# Patient Record
Sex: FEMALE | Race: WHITE | URBAN - METROPOLITAN AREA
[De-identification: names, ages, dates, MRNs, and addresses within clinical notes are randomized per-mention and may not be internally consistent; named-entity substitution may affect disease eponyms.]

---

## 2017-03-23 ENCOUNTER — EMERGENCY (EMERGENCY)
Facility: HOSPITAL | Age: 52
LOS: 0 days | Discharge: HOME | End: 2017-03-23
Attending: EMERGENCY MEDICINE | Admitting: EMERGENCY MEDICINE
Payer: COMMERCIAL

## 2017-03-23 VITALS
RESPIRATION RATE: 16 BRPM | DIASTOLIC BLOOD PRESSURE: 81 MMHG | SYSTOLIC BLOOD PRESSURE: 121 MMHG | HEART RATE: 99 BPM | TEMPERATURE: 98 F | OXYGEN SATURATION: 98 %

## 2017-03-23 VITALS — HEIGHT: 66 IN | WEIGHT: 220.02 LBS
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
## 2017-03-23 DIAGNOSIS — S52.591A OTHER FRACTURES OF LOWER END OF RIGHT RADIUS, INITIAL ENCOUNTER FOR CLOSED FRACTURE: ICD-10-CM

## 2017-03-23 DIAGNOSIS — S69.91XA UNSPECIFIED INJURY OF RIGHT WRIST, HAND AND FINGER(S), INITIAL ENCOUNTER: ICD-10-CM
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
## 2017-03-23 DIAGNOSIS — M79.673 PAIN IN UNSPECIFIED FOOT: ICD-10-CM

## 2017-03-23 DIAGNOSIS — M25.531 PAIN IN RIGHT WRIST: ICD-10-CM

## 2017-03-23 PROCEDURE — 99283 EMERGENCY DEPT VISIT LOW MDM: CPT

## 2017-03-23 PROCEDURE — 73110 X-RAY EXAM OF WRIST: CPT | Mod: 26,RT

## 2017-03-23 RX ORDER — IBUPROFEN 200 MG
600 TABLET ORAL ONCE
Qty: 0 | Refills: 0 | Status: DISCONTINUED | OUTPATIENT
Start: 2017-03-23 | End: 2017-03-23

## 2017-03-23 NOTE — ED STATDOCS - OBJECTIVE STATEMENT
50 y/o F presents to ED s/p slip and fall yesterday c/o right wrist injury. Pt had pain immediately after fall yesterday, went to her PMD who did XR which showed right distal radial fracture. Pain persisted today so she called Dr. Thom malave who requested consult in ED. Pt is right hand dominant.

## 2017-03-23 NOTE — ED STATDOCS - MEDICAL DECISION MAKING DETAILS
Pt. to keep arm elevated, keep splint on at all times and take ibuprofen 600mg every 6 hours for pain.  Pt. to see Dr. Tomas in the office in 1 week.

## 2017-03-23 NOTE — ED STATDOCS - DETAILS:
I, Marcia Kemp, performed the initial face to face bedside interview with this patient regarding history of present illness, review of symptoms and relevant past medical, social and family history.  I completed an independent physical examination.  I was the initial provider who evaluated this patient. I have signed out the follow up of any pending tests (i.e. labs, radiological studies) to the ACP.  I have communicated the patient’s plan of care and disposition with the ACP.  The history, relevant review of systems, past medical and surgical history, medical decision making, and physical examination was documented by the scribe in my presence and I attest to the accuracy of the documentation.

## 2017-03-23 NOTE — ED STATDOCS - ATTENDING CONTRIBUTION TO CARE
Attending Contribution to Care: I, Marcia Kemp, performed the initial face to face bedside interview with this patient regarding history of present illness, review of symptoms and relevant past medical, social and family history.  I completed an independent physical examination.  I was the initial provider who evaluated this patient. I have signed out the follow up of any pending tests (i.e. labs, radiological studies) to the ACP.  I have communicated the patient’s plan of care and disposition with the ACP.

## 2017-03-23 NOTE — ED STATDOCS - PROGRESS NOTE DETAILS
52 yo female presents with right wrist p[ain s/p slip and fall in driveway. Pt went to her PMD, had a xray and showed a fracture and was sent to the ER. NVS intact. Decreased rom of the right wrist secondary to pain. Milf ttp toe h dorsal aspect of the right distal forearm. MIld edema to the wrist and hand. - Alonzo Villalta PA-C Dr. Tomas came to see pt toe splint and reduce the fracture. - Alonzo Villalta PA-C Dr. Tomas and his residents called for consult. Fx reduced and new splint placed on RUE.

## 2017-03-23 NOTE — ED STATDOCS - CARE PLAN
Principal Discharge DX:	Other closed fracture of distal end of right radius, initial encounter  Secondary Diagnosis:	Wrist injury, right, initial encounter

## 2017-03-23 NOTE — ED STATDOCS - NS ED MD SCRIBE ATTENDING SCRIBE SECTIONS
RESULTS/CONSULTATIONS/SHIFT CHANGE/PROGRESS NOTE/REVIEW OF SYSTEMS/DISPOSITION/PHYSICAL EXAM/HISTORY OF PRESENT ILLNESS/PAST MEDICAL/SURGICAL/SOCIAL HISTORY

## 2017-04-02 ENCOUNTER — INPATIENT (INPATIENT)
Facility: HOSPITAL | Age: 52
LOS: 0 days | Discharge: HOME | End: 2017-04-02
Attending: ORTHOPAEDIC SURGERY | Admitting: ORTHOPAEDIC SURGERY
Payer: COMMERCIAL

## 2017-04-02 VITALS
DIASTOLIC BLOOD PRESSURE: 68 MMHG | SYSTOLIC BLOOD PRESSURE: 117 MMHG | TEMPERATURE: 98 F | OXYGEN SATURATION: 100 % | RESPIRATION RATE: 12 BRPM | HEART RATE: 74 BPM

## 2017-04-02 VITALS — HEIGHT: 66 IN | WEIGHT: 220.02 LBS

## 2017-04-02 DIAGNOSIS — S62.101G: ICD-10-CM

## 2017-04-02 PROCEDURE — 73110 X-RAY EXAM OF WRIST: CPT | Mod: 26,RT

## 2017-04-02 PROCEDURE — 99284 EMERGENCY DEPT VISIT MOD MDM: CPT

## 2017-04-02 PROCEDURE — 73090 X-RAY EXAM OF FOREARM: CPT | Mod: 26,RT

## 2017-04-02 RX ORDER — OXYCODONE HYDROCHLORIDE 5 MG/1
5 TABLET ORAL EVERY 4 HOURS
Qty: 0 | Refills: 0 | Status: DISCONTINUED | OUTPATIENT
Start: 2017-04-02 | End: 2017-04-02

## 2017-04-02 RX ORDER — MAGNESIUM HYDROXIDE 400 MG/1
30 TABLET, CHEWABLE ORAL DAILY
Qty: 0 | Refills: 0 | Status: DISCONTINUED | OUTPATIENT
Start: 2017-04-02 | End: 2017-04-02

## 2017-04-02 RX ORDER — ONDANSETRON 8 MG/1
4 TABLET, FILM COATED ORAL ONCE
Qty: 0 | Refills: 0 | Status: DISCONTINUED | OUTPATIENT
Start: 2017-04-02 | End: 2017-04-02

## 2017-04-02 RX ORDER — CEPHALEXIN 500 MG
1 CAPSULE ORAL
Qty: 6 | Refills: 0 | OUTPATIENT
Start: 2017-04-02 | End: 2017-04-05

## 2017-04-02 RX ORDER — FENTANYL CITRATE 50 UG/ML
50 INJECTION INTRAVENOUS
Qty: 0 | Refills: 0 | Status: DISCONTINUED | OUTPATIENT
Start: 2017-04-02 | End: 2017-04-02

## 2017-04-02 RX ORDER — CEFAZOLIN SODIUM 1 G
2000 VIAL (EA) INJECTION EVERY 8 HOURS
Qty: 0 | Refills: 0 | Status: DISCONTINUED | OUTPATIENT
Start: 2017-04-02 | End: 2017-04-02

## 2017-04-02 RX ORDER — SODIUM CHLORIDE 9 MG/ML
3 INJECTION INTRAMUSCULAR; INTRAVENOUS; SUBCUTANEOUS EVERY 8 HOURS
Qty: 0 | Refills: 0 | Status: DISCONTINUED | OUTPATIENT
Start: 2017-04-02 | End: 2017-04-02

## 2017-04-02 RX ORDER — ACETAMINOPHEN 500 MG
650 TABLET ORAL EVERY 6 HOURS
Qty: 0 | Refills: 0 | Status: DISCONTINUED | OUTPATIENT
Start: 2017-04-02 | End: 2017-04-02

## 2017-04-02 RX ORDER — SODIUM CHLORIDE 9 MG/ML
1000 INJECTION, SOLUTION INTRAVENOUS
Qty: 0 | Refills: 0 | Status: DISCONTINUED | OUTPATIENT
Start: 2017-04-02 | End: 2017-04-02

## 2017-04-02 RX ORDER — OXYCODONE HYDROCHLORIDE 5 MG/1
10 TABLET ORAL EVERY 4 HOURS
Qty: 0 | Refills: 0 | Status: DISCONTINUED | OUTPATIENT
Start: 2017-04-02 | End: 2017-04-02

## 2017-04-02 RX ORDER — ALBUTEROL 90 UG/1
1 AEROSOL, METERED ORAL
Qty: 0 | Refills: 0 | Status: DISCONTINUED | OUTPATIENT
Start: 2017-04-02 | End: 2017-04-02

## 2017-04-02 RX ORDER — HYDROMORPHONE HYDROCHLORIDE 2 MG/ML
0.5 INJECTION INTRAMUSCULAR; INTRAVENOUS; SUBCUTANEOUS
Qty: 0 | Refills: 0 | Status: DISCONTINUED | OUTPATIENT
Start: 2017-04-02 | End: 2017-04-02

## 2017-04-02 RX ORDER — DIPHENHYDRAMINE HCL 50 MG
25 CAPSULE ORAL AT BEDTIME
Qty: 0 | Refills: 0 | Status: DISCONTINUED | OUTPATIENT
Start: 2017-04-02 | End: 2017-04-02

## 2017-04-02 RX ORDER — ONDANSETRON 8 MG/1
4 TABLET, FILM COATED ORAL EVERY 6 HOURS
Qty: 0 | Refills: 0 | Status: DISCONTINUED | OUTPATIENT
Start: 2017-04-02 | End: 2017-04-02

## 2017-04-02 RX ORDER — INFLUENZA VIRUS VACCINE 15; 15; 15; 15 UG/.5ML; UG/.5ML; UG/.5ML; UG/.5ML
0.5 SUSPENSION INTRAMUSCULAR ONCE
Qty: 0 | Refills: 0 | Status: DISCONTINUED | OUTPATIENT
Start: 2017-04-02 | End: 2017-04-02

## 2017-04-02 RX ORDER — DOCUSATE SODIUM 100 MG
100 CAPSULE ORAL THREE TIMES A DAY
Qty: 0 | Refills: 0 | Status: DISCONTINUED | OUTPATIENT
Start: 2017-04-02 | End: 2017-04-02

## 2017-04-02 NOTE — ED STATDOCS - OBJECTIVE STATEMENT
50 y/o F PMHx Right hand dominant, presents to the ED c/o right arm pain. The pt provides that she fractured her wrist 5 days ago (on driveway), but tripped today morning d/t pain in her left leg and landed on her right wrist. The pt notes that her leg pain has been present since the summer, and feels like her left knee keeps cracking with some  left hip pain. No h/o loc, head trauma, headache, fever, chills, dizziness, abd pain, nvd, cp, cough, sob, or urinary incontinence.

## 2017-04-02 NOTE — DISCHARGE NOTE ADULT - HOSPITAL COURSE
The patient is a 51 year old female status post Open Reduction Surgical Fixation of a right distal radius Fracture with carpal tunnel release  after being admitted through [facility] Emergency Room. The Patient was medically Optimized for the Previously mentioned surgical procedure. The patient was taken to the operating room on date mentioned above. Prophylactic antibiotics were started before the procedure and continued for 24 hours.  There were no complications during the procedure and patient tolerated the procedure well.  The patient was transferred to recovery room in stable condition and subsequently to surgical floor.  Patient was placed on venodynes for anticoagulation.  All home medications were continued.  The patient received physical therapy daily and daily labs were followed.  The dressing  was kept clean, dry, intact.  The rest of the hospital stay was unremarkable.  The patient was discharged in stable condition to follow up as outpatient.

## 2017-04-02 NOTE — ED STATDOCS - CARE PLAN
Principal Discharge DX:	Closed fracture of wrist, right, with delayed healing, subsequent encounter  Secondary Diagnosis:	Wrist injury, right, sequela

## 2017-04-02 NOTE — ED STATDOCS - NS ED MD SCRIBE ATTENDING SCRIBE SECTIONS
REVIEW OF SYSTEMS/DISPOSITION/PHYSICAL EXAM/PAST MEDICAL/SURGICAL/SOCIAL HISTORY/INTAKE ASSESSMENT/SCREENINGS/PROGRESS NOTE/HISTORY OF PRESENT ILLNESS/CONSULTATIONS/SHIFT CHANGE/RESULTS/HIV

## 2017-04-02 NOTE — DISCHARGE NOTE ADULT - PATIENT PORTAL LINK FT
“You can access the [facility] Patient Portal, offered by [facility], by registering with the following website: [email or web address]”

## 2017-04-02 NOTE — ED STATDOCS - MUSCULOSKELETAL, MLM
+Splint over right wrist and forearm (not removed for xray), no deformity/tenderness over left knee or left hip, range of motion is not limited and there is no muscle tenderness.

## 2017-04-02 NOTE — DISCHARGE NOTE ADULT - PLAN OF CARE
return to ADLs 1.	Pain Control  2.	Non-Weight Bearing  right upper Extremity, with assistive devices as needed  3.	DVT Prophylaxis  4.	PT as needed  5.	Follow up with Dr. Tomas as Outpatient in 10-14 Days after Discharge from the Hospital or Rehab. Call Office For Appointment.  6.	 Staples/Sutures to be removed  Post-Op Day 14, and repeat x-rays  7.	Ice/Elevate affected area as Needed  8.	Keep dressing/splint Clean and dry. 1.	Pain Control  2.	Non-Weight Bearing  right upper Extremity, with assistive devices as needed  3.	DVT Prophylaxis  4.	PT as needed  5.	Follow up with Dr. Tomas as Outpatient in 1 week after Discharge from the Hospital or Rehab. Call Office For Appointment.  6.	 Staples/Sutures to be removed  Post-Op Day 14, and repeat x-rays  7.	Ice/Elevate affected area as Needed  8.	Keep dressing/splint Clean and dry.

## 2017-04-02 NOTE — DISCHARGE NOTE ADULT - NS AS ACTIVITY OBS
No Heavy lifting/straining/NWB right upper extremity/Do not drive or operate machinery/Do not make important decisions

## 2017-04-02 NOTE — DISCHARGE NOTE ADULT - MEDICATION SUMMARY - MEDICATIONS TO TAKE
I will START or STAY ON the medications listed below when I get home from the hospital:    oxyCODONE-acetaminophen 5 mg-325 mg oral tablet  -- 1 tab(s) by mouth every 4 hours, As Needed -for severe pain MDD:6  -- Caution federal law prohibits the transfer of this drug to any person other  than the person for whom it was prescribed.  May cause drowsiness.  Alcohol may intensify this effect.  Use care when operating dangerous machinery.  This prescription cannot be refilled.  This product contains acetaminophen.  Do not use  with any other product containing acetaminophen to prevent possible liver damage.  Using more of this medication than prescribed may cause serious breathing problems.    -- Indication: For pain    albuterol 90 mcg/inh inhalation aerosol  -- 2 puff(s) inhaled 4 times a day, As Needed  -- Indication: For home med    cephalexin 500 mg oral tablet  -- 1 tab(s) by mouth 2 times a day  -- Finish all this medication unless otherwise directed by prescriber.    -- Indication: For antibiotic

## 2017-04-02 NOTE — ED ADULT NURSE NOTE - OBJECTIVE STATEMENT
52 y/o F c/o right arm pain and tingling, left leg pain. Pt fell last week and broke right  wrist, today, pt tripped in bathroom injuring same arm. Pt denies loc, denies head pain.

## 2017-04-02 NOTE — BRIEF OPERATIVE NOTE - POST-OP DX
Carpal tunnel syndrome on right  04/02/2017    Active  Raghu Lopez  Distal radius fracture, right  04/02/2017    Active  Raghu Lopez S

## 2017-04-02 NOTE — H&P ADULT - NSHPPHYSICALEXAM_GEN_ALL_CORE
Gen: NAD  RUE: +swelling/ttp dorsum R wrist. clean, dry, and intact. +AIN/PIN/MN/RN/UN/MCN/AxN. SILT C5-T1. +RA, capillary refill brisk. Compartments soft.  Secondary: b/l le, lue: full rom, mild pain with b/l knee rom.  no ttp bony prominences.  silt, pulses palpable.

## 2017-04-02 NOTE — DISCHARGE NOTE ADULT - INSTRUCTIONS
for fever > 101F, any redness, swelling, increased pain or drainage at incision site please contact MD

## 2017-04-02 NOTE — ED STATDOCS - PROGRESS NOTE DETAILS
52 yo female with recent right wrist fracture presents with right wrist injury. Pt states her left knee gave out and she went forward against a bathroom counter with her splint and reinjured her arm. Denies f/c/sweating, leg swelling, calf pain. No calf ttp. Right forearm in splint and ACE wrap. Re xray. Ortho resident at bedside will evaluate pt. - Willie Villalta PA-C

## 2017-04-02 NOTE — DISCHARGE NOTE ADULT - CARE PLAN
Principal Discharge DX:	Closed fracture of wrist, right, with delayed healing, subsequent encounter  Goal:	return to ADLs  Instructions for follow-up, activity and diet:	1.	Pain Control  2.	Non-Weight Bearing  right upper Extremity, with assistive devices as needed  3.	DVT Prophylaxis  4.	PT as needed  5.	Follow up with Dr. Tomas as Outpatient in 10-14 Days after Discharge from the Hospital or Rehab. Call Office For Appointment.  6.	 Staples/Sutures to be removed  Post-Op Day 14, and repeat x-rays  7.	Ice/Elevate affected area as Needed  8.	Keep dressing/splint Clean and dry. Principal Discharge DX:	Closed fracture of wrist, right, with delayed healing, subsequent encounter  Goal:	return to ADLs  Instructions for follow-up, activity and diet:	1.	Pain Control  2.	Non-Weight Bearing  right upper Extremity, with assistive devices as needed  3.	DVT Prophylaxis  4.	PT as needed  5.	Follow up with Dr. Tomas as Outpatient in 1 week after Discharge from the Hospital or Rehab. Call Office For Appointment.  6.	 Staples/Sutures to be removed  Post-Op Day 14, and repeat x-rays  7.	Ice/Elevate affected area as Needed  8.	Keep dressing/splint Clean and dry.

## 2017-04-02 NOTE — BRIEF OPERATIVE NOTE - PROCEDURE
Wrist fracture surgery  04/02/2017  right distal radius ORIF, right carpal tunnel release  Active  NOLSON

## 2017-04-02 NOTE — ED STATDOCS - CHPI ED SYMPTOM NEG
no fever/no hematuria/no abdominal distention/no blood in stool/no diarrhea/no vomiting/no nausea/no burning urination/no dysuria/no palpitations

## 2017-04-02 NOTE — ED ADULT NURSE REASSESSMENT NOTE - NS ED NURSE REASSESS COMMENT FT1
Dr. Tomas at the bedside evaluating pt, pt possibly pending surgery, will continue to monitor for safety and comfort.

## 2017-04-02 NOTE — DISCHARGE NOTE ADULT - CARE PROVIDER_API CALL
Magaly Tomas), Orthopaedic Surgery; Surgery of the Hand  [address] [place] [address]  Phone: [phone]  Fax: [phone]

## 2017-04-02 NOTE — H&P ADULT - HISTORY OF PRESENT ILLNESS
51[facility] sustained R distal radius fx.  Pt presents to ED today s/p another [facility] fall today, c/o increasing pain, mild paresthesias in right thumb and index finger.  Denies HS/LOC, also complains of mild occasional knee pain.  Able to ambulate.    RHD.      PMH: CTS  PSH: denies  Meds: see med rec  Allergy: denies  Imaging: XR R wrist show displaced distal radius fx

## 2017-04-04 DIAGNOSIS — S52.571A OTHER INTRAARTICULAR FRACTURE OF LOWER END OF RIGHT RADIUS, INITIAL ENCOUNTER FOR CLOSED FRACTURE: ICD-10-CM
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
## 2017-04-04 DIAGNOSIS — F17.210 NICOTINE DEPENDENCE, CIGARETTES, UNCOMPLICATED: ICD-10-CM

## 2017-04-04 DIAGNOSIS — J45.909 UNSPECIFIED ASTHMA, UNCOMPLICATED: ICD-10-CM
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
## 2017-04-04 DIAGNOSIS — G56.01 CARPAL TUNNEL SYNDROME, RIGHT UPPER LIMB: ICD-10-CM
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
## 2017-11-30 ENCOUNTER — APPOINTMENT (OUTPATIENT)
Dept: BARIATRICS | Facility: CLINIC | Age: 52
End: 2017-11-30
Payer: COMMERCIAL

## 2017-11-30 VITALS
BODY MASS INDEX: 40.36 KG/M2 | SYSTOLIC BLOOD PRESSURE: 122 MMHG | DIASTOLIC BLOOD PRESSURE: 80 MMHG | WEIGHT: 251.1 LBS | HEIGHT: 66 IN

## 2017-11-30 DIAGNOSIS — F17.200 NICOTINE DEPENDENCE, UNSPECIFIED, UNCOMPLICATED: ICD-10-CM

## 2017-11-30 DIAGNOSIS — Z82.49 FAMILY HISTORY OF ISCHEMIC HEART DISEASE AND OTHER DISEASES OF THE CIRCULATORY SYSTEM: ICD-10-CM

## 2017-11-30 DIAGNOSIS — Z78.9 OTHER SPECIFIED HEALTH STATUS: ICD-10-CM

## 2017-11-30 DIAGNOSIS — Z87.19 PERSONAL HISTORY OF OTHER DISEASES OF THE DIGESTIVE SYSTEM: ICD-10-CM

## 2017-11-30 DIAGNOSIS — Z86.19 PERSONAL HISTORY OF OTHER INFECTIOUS AND PARASITIC DISEASES: ICD-10-CM

## 2017-11-30 DIAGNOSIS — Z86.79 PERSONAL HISTORY OF OTHER DISEASES OF THE CIRCULATORY SYSTEM: ICD-10-CM

## 2017-11-30 DIAGNOSIS — Z82.3 FAMILY HISTORY OF STROKE: ICD-10-CM

## 2017-11-30 DIAGNOSIS — E66.01 MORBID (SEVERE) OBESITY DUE TO EXCESS CALORIES: ICD-10-CM

## 2017-11-30 PROCEDURE — 99244 OFF/OP CNSLTJ NEW/EST MOD 40: CPT

## 2017-12-08 RX ORDER — CALCIUM CARBONATE/VITAMIN D3 600 MG-10
TABLET ORAL
Refills: 0 | Status: ACTIVE | COMMUNITY

## 2017-12-08 RX ORDER — BACILLUS COAGULANS/INULIN 1B-250 MG
CAPSULE ORAL
Refills: 0 | Status: ACTIVE | COMMUNITY

## 2017-12-08 RX ORDER — BUDESONIDE AND FORMOTEROL FUMARATE DIHYDRATE 160; 4.5 UG/1; UG/1
AEROSOL RESPIRATORY (INHALATION)
Refills: 0 | Status: ACTIVE | COMMUNITY

## 2017-12-08 RX ORDER — MULTIVITAMIN
TABLET ORAL
Refills: 0 | Status: ACTIVE | COMMUNITY

## 2018-06-22 ENCOUNTER — EMERGENCY (EMERGENCY)
Facility: HOSPITAL | Age: 53
LOS: 0 days | Discharge: HOME | End: 2018-06-22
Attending: EMERGENCY MEDICINE | Admitting: EMERGENCY MEDICINE
Payer: COMMERCIAL

## 2018-06-22 VITALS — OXYGEN SATURATION: 94 %

## 2018-06-22 VITALS — WEIGHT: 240.08 LBS

## 2018-06-22 DIAGNOSIS — J18.9 PNEUMONIA, UNSPECIFIED ORGANISM: ICD-10-CM

## 2018-06-22 DIAGNOSIS — F17.210 NICOTINE DEPENDENCE, CIGARETTES, UNCOMPLICATED: ICD-10-CM

## 2018-06-22 LAB
ALBUMIN SERPL ELPH-MCNC: 3.4 G/DL — SIGNIFICANT CHANGE UP (ref 3.3–5)
ALP SERPL-CCNC: 93 U/L — SIGNIFICANT CHANGE UP (ref 40–120)
ALT FLD-CCNC: 18 U/L — SIGNIFICANT CHANGE UP (ref 12–78)
ANION GAP SERPL CALC-SCNC: 5 MMOL/L — SIGNIFICANT CHANGE UP (ref 5–17)
AST SERPL-CCNC: 14 U/L — LOW (ref 15–37)
BASOPHILS # BLD AUTO: 0.06 K/UL — SIGNIFICANT CHANGE UP (ref 0–0.2)
BASOPHILS NFR BLD AUTO: 0.5 % — SIGNIFICANT CHANGE UP (ref 0–2)
BILIRUB SERPL-MCNC: 0.5 MG/DL — SIGNIFICANT CHANGE UP (ref 0.2–1.2)
BUN SERPL-MCNC: 12 MG/DL — SIGNIFICANT CHANGE UP (ref 7–23)
CALCIUM SERPL-MCNC: 8.7 MG/DL — SIGNIFICANT CHANGE UP (ref 8.5–10.1)
CHLORIDE SERPL-SCNC: 106 MMOL/L — SIGNIFICANT CHANGE UP (ref 96–108)
CO2 SERPL-SCNC: 27 MMOL/L — SIGNIFICANT CHANGE UP (ref 22–31)
CREAT SERPL-MCNC: 0.76 MG/DL — SIGNIFICANT CHANGE UP (ref 0.5–1.3)
EOSINOPHIL # BLD AUTO: 0.45 K/UL — SIGNIFICANT CHANGE UP (ref 0–0.5)
EOSINOPHIL NFR BLD AUTO: 3.6 % — SIGNIFICANT CHANGE UP (ref 0–6)
GLUCOSE SERPL-MCNC: 106 MG/DL — HIGH (ref 70–99)
HCT VFR BLD CALC: 42.5 % — SIGNIFICANT CHANGE UP (ref 34.5–45)
HGB BLD-MCNC: 14 G/DL — SIGNIFICANT CHANGE UP (ref 11.5–15.5)
IMM GRANULOCYTES NFR BLD AUTO: 0.3 % — SIGNIFICANT CHANGE UP (ref 0–1.5)
LYMPHOCYTES # BLD AUTO: 1.05 K/UL — SIGNIFICANT CHANGE UP (ref 1–3.3)
LYMPHOCYTES # BLD AUTO: 8.3 % — LOW (ref 13–44)
MCHC RBC-ENTMCNC: 30.3 PG — SIGNIFICANT CHANGE UP (ref 27–34)
MCHC RBC-ENTMCNC: 32.9 GM/DL — SIGNIFICANT CHANGE UP (ref 32–36)
MCV RBC AUTO: 92 FL — SIGNIFICANT CHANGE UP (ref 80–100)
MONOCYTES # BLD AUTO: 0.66 K/UL — SIGNIFICANT CHANGE UP (ref 0–0.9)
MONOCYTES NFR BLD AUTO: 5.2 % — SIGNIFICANT CHANGE UP (ref 2–14)
NEUTROPHILS # BLD AUTO: 10.33 K/UL — HIGH (ref 1.8–7.4)
NEUTROPHILS NFR BLD AUTO: 82.1 % — HIGH (ref 43–77)
NRBC # BLD: 0 /100 WBCS — SIGNIFICANT CHANGE UP (ref 0–0)
NT-PROBNP SERPL-SCNC: 105 PG/ML — SIGNIFICANT CHANGE UP (ref 0–125)
PLATELET # BLD AUTO: 270 K/UL — SIGNIFICANT CHANGE UP (ref 150–400)
POTASSIUM SERPL-MCNC: 4.1 MMOL/L — SIGNIFICANT CHANGE UP (ref 3.5–5.3)
POTASSIUM SERPL-SCNC: 4.1 MMOL/L — SIGNIFICANT CHANGE UP (ref 3.5–5.3)
PROT SERPL-MCNC: 7.8 GM/DL — SIGNIFICANT CHANGE UP (ref 6–8.3)
RBC # BLD: 4.62 M/UL — SIGNIFICANT CHANGE UP (ref 3.8–5.2)
RBC # FLD: 13.2 % — SIGNIFICANT CHANGE UP (ref 10.3–14.5)
SODIUM SERPL-SCNC: 138 MMOL/L — SIGNIFICANT CHANGE UP (ref 135–145)
TROPONIN I SERPL-MCNC: <0.015 NG/ML — SIGNIFICANT CHANGE UP (ref 0.01–0.04)
WBC # BLD: 12.59 K/UL — HIGH (ref 3.8–10.5)
WBC # FLD AUTO: 12.59 K/UL — HIGH (ref 3.8–10.5)

## 2018-06-22 PROCEDURE — 93010 ELECTROCARDIOGRAM REPORT: CPT

## 2018-06-22 PROCEDURE — 71260 CT THORAX DX C+: CPT | Mod: 26

## 2018-06-22 PROCEDURE — 99285 EMERGENCY DEPT VISIT HI MDM: CPT

## 2018-06-22 RX ORDER — IPRATROPIUM/ALBUTEROL SULFATE 18-103MCG
3 AEROSOL WITH ADAPTER (GRAM) INHALATION ONCE
Qty: 0 | Refills: 0 | Status: COMPLETED | OUTPATIENT
Start: 2018-06-22 | End: 2018-06-22

## 2018-06-22 RX ORDER — ALBUTEROL 90 UG/1
2 AEROSOL, METERED ORAL
Qty: 0 | Refills: 0 | COMMUNITY

## 2018-06-22 RX ORDER — SODIUM CHLORIDE 9 MG/ML
1000 INJECTION INTRAMUSCULAR; INTRAVENOUS; SUBCUTANEOUS ONCE
Qty: 0 | Refills: 0 | Status: COMPLETED | OUTPATIENT
Start: 2018-06-22 | End: 2018-06-22

## 2018-06-22 RX ORDER — NICOTINE POLACRILEX 2 MG
1 GUM BUCCAL DAILY
Qty: 0 | Refills: 0 | Status: DISCONTINUED | OUTPATIENT
Start: 2018-06-22 | End: 2018-06-22

## 2018-06-22 RX ORDER — ALBUTEROL 90 UG/1
1 AEROSOL, METERED ORAL
Qty: 1 | Refills: 0
Start: 2018-06-22 | End: 2018-07-21

## 2018-06-22 RX ADMIN — SODIUM CHLORIDE 1000 MILLILITER(S): 9 INJECTION INTRAMUSCULAR; INTRAVENOUS; SUBCUTANEOUS at 10:44

## 2018-06-22 RX ADMIN — Medication 1 PATCH: at 12:19

## 2018-06-22 RX ADMIN — Medication 3 MILLILITER(S): at 10:36

## 2018-06-22 RX ADMIN — Medication 3 MILLILITER(S): at 11:36

## 2018-06-22 NOTE — ED STATDOCS - PROGRESS NOTE DETAILS
52 yo F with 30 pack year smoking hx sent to ED from urgent care for worsening pneumonia, she was seen originally yesterday and given rx for clindamycin, returned today with shortness of breath, given duoneb and sent to ED for CT chest.  Pt reports productive cough, occasionally blood streaked, N/V, body aches, and L calf pain.  Denies recent travel, leg swelling.    PE: decreased breath sounds b/l, wheezing b/l, heart RRR, LE no edema noted NTTP  Plan: CTA chest, EKG, duoneb, IVF, labs, duoneb  Samantha Solano PA-C CTA showed multifocal pneumonia, no PE.  Pt feeling better after duoneb, plan for d/c home with antibiotics and inhaler, f/u with pulmonologist.  Pt agreeable to d/c and plan of care, strict return precautions given, all questions answered.  Samantha Solano PA-C CTA showed multifocal pneumonia, no PE.  Pt feeling better after duoneb x2, plan for d/c home with antibiotics and inhaler with spacer given by respiratory, f/u with pulmonologist (pt has her own MD).  Pt agreeable to d/c and plan of care, strict return precautions given, all questions answered.  Samantha Solano PA-C

## 2018-06-22 NOTE — ED STATDOCS - MEDICAL DECISION MAKING DETAILS
Plan for evaluation of pneumonia vs mass vs PE. Cardiac evaluation to r/o pulmonary edema causing cough and SOB.

## 2018-06-22 NOTE — ED ADULT TRIAGE NOTE - CHIEF COMPLAINT QUOTE
pt c/o cough, congestion, sputum production, and  SOB for 1.5 weeks, seen at urgent care today and diagnosed with LL lobe pneumonia, pt sent to ED for XT of lungs to r/o cancer as chest xray looked suspicious.. pt speaking clearly with good color,no signs of resp distress, emotional support provided. pt states she may have had fever last night

## 2018-06-22 NOTE — ED STATDOCS - OBJECTIVE STATEMENT
54 y/o female with no PMHx presents to the ED c/o cough, congestion. Pt states she went to urgent care yesterday for symptoms, CXR performed confirmed pneumonia, prescribed clindamycin and Bendryl at that time. Pt returned to urgent care this morning due to episode of difficulty breathing, was given nebulizer for dyspnea and sent to ED for further eval. Pt reports SOB, productive cough, some hemoptysis, vomiting, body aches and pains, L calf pain. Denies recent travel, peripheral edema. Pt has been smoking for about 30+ years, 1 pack per day.

## 2018-06-22 NOTE — ED STATDOCS - ATTENDING CONTRIBUTION TO CARE
I, Ray Melendez, performed the initial face to face bedside interview with this patient regarding history of present illness, review of symptoms and relevant past medical, social and family history.  I completed an independent physical examination.  I was the initial provider who evaluated this patient. I have signed out the follow up of any pending tests (i.e. labs, radiological studies) to the ACP.  I have communicated the patient’s plan of care and disposition with the ACP.  The history, relevant review of systems, past medical and surgical history, medical decision making, and physical examination was documented by the scribe in my presence and I attest to the accuracy of the documentation.

## 2018-12-12 NOTE — ED ADULT NURSE NOTE - CAS TRG GEN SKIN COLOR
[FreeTextEntry1] : Rash: prednisone 5 mg daily. Will taper in 1 month\par Hypothyroid: check labs Normal for race

## 2018-12-13 ENCOUNTER — EMERGENCY (EMERGENCY)
Facility: HOSPITAL | Age: 53
LOS: 0 days | Discharge: HOME | End: 2018-12-13
Attending: EMERGENCY MEDICINE | Admitting: EMERGENCY MEDICINE
Payer: COMMERCIAL

## 2018-12-13 VITALS
OXYGEN SATURATION: 100 % | HEART RATE: 94 BPM | SYSTOLIC BLOOD PRESSURE: 122 MMHG | TEMPERATURE: 99 F | RESPIRATION RATE: 18 BRPM | DIASTOLIC BLOOD PRESSURE: 87 MMHG

## 2018-12-13 VITALS — WEIGHT: 205.03 LBS | HEIGHT: 69 IN

## 2018-12-13 DIAGNOSIS — R07.89 OTHER CHEST PAIN: ICD-10-CM

## 2018-12-13 DIAGNOSIS — R07.9 CHEST PAIN, UNSPECIFIED: ICD-10-CM

## 2018-12-13 DIAGNOSIS — R06.02 SHORTNESS OF BREATH: ICD-10-CM

## 2018-12-13 DIAGNOSIS — F17.210 NICOTINE DEPENDENCE, CIGARETTES, UNCOMPLICATED: ICD-10-CM

## 2018-12-13 DIAGNOSIS — J18.9 PNEUMONIA, UNSPECIFIED ORGANISM: ICD-10-CM

## 2018-12-13 LAB
ALBUMIN SERPL ELPH-MCNC: 3.5 G/DL — SIGNIFICANT CHANGE UP (ref 3.3–5)
ALP SERPL-CCNC: 107 U/L — SIGNIFICANT CHANGE UP (ref 40–120)
ALT FLD-CCNC: 22 U/L — SIGNIFICANT CHANGE UP (ref 12–78)
ANION GAP SERPL CALC-SCNC: 6 MMOL/L — SIGNIFICANT CHANGE UP (ref 5–17)
AST SERPL-CCNC: 21 U/L — SIGNIFICANT CHANGE UP (ref 15–37)
BILIRUB SERPL-MCNC: 0.3 MG/DL — SIGNIFICANT CHANGE UP (ref 0.2–1.2)
BUN SERPL-MCNC: 18 MG/DL — SIGNIFICANT CHANGE UP (ref 7–23)
CALCIUM SERPL-MCNC: 9 MG/DL — SIGNIFICANT CHANGE UP (ref 8.5–10.1)
CHLORIDE SERPL-SCNC: 106 MMOL/L — SIGNIFICANT CHANGE UP (ref 96–108)
CO2 SERPL-SCNC: 29 MMOL/L — SIGNIFICANT CHANGE UP (ref 22–31)
CREAT SERPL-MCNC: 0.8 MG/DL — SIGNIFICANT CHANGE UP (ref 0.5–1.3)
D DIMER BLD IA.RAPID-MCNC: 152 NG/ML DDU — SIGNIFICANT CHANGE UP
GLUCOSE SERPL-MCNC: 93 MG/DL — SIGNIFICANT CHANGE UP (ref 70–99)
HCT VFR BLD CALC: 41.1 % — SIGNIFICANT CHANGE UP (ref 34.5–45)
HGB BLD-MCNC: 13.2 G/DL — SIGNIFICANT CHANGE UP (ref 11.5–15.5)
MAGNESIUM SERPL-MCNC: 2.2 MG/DL — SIGNIFICANT CHANGE UP (ref 1.6–2.6)
MCHC RBC-ENTMCNC: 30.5 PG — SIGNIFICANT CHANGE UP (ref 27–34)
MCHC RBC-ENTMCNC: 32.1 GM/DL — SIGNIFICANT CHANGE UP (ref 32–36)
MCV RBC AUTO: 94.9 FL — SIGNIFICANT CHANGE UP (ref 80–100)
NRBC # BLD: 0 /100 WBCS — SIGNIFICANT CHANGE UP (ref 0–0)
NT-PROBNP SERPL-SCNC: 58 PG/ML — SIGNIFICANT CHANGE UP (ref 0–125)
PLATELET # BLD AUTO: 333 K/UL — SIGNIFICANT CHANGE UP (ref 150–400)
POTASSIUM SERPL-MCNC: 4.6 MMOL/L — SIGNIFICANT CHANGE UP (ref 3.5–5.3)
POTASSIUM SERPL-SCNC: 4.6 MMOL/L — SIGNIFICANT CHANGE UP (ref 3.5–5.3)
PROT SERPL-MCNC: 7.8 GM/DL — SIGNIFICANT CHANGE UP (ref 6–8.3)
RBC # BLD: 4.33 M/UL — SIGNIFICANT CHANGE UP (ref 3.8–5.2)
RBC # FLD: 13.2 % — SIGNIFICANT CHANGE UP (ref 10.3–14.5)
SODIUM SERPL-SCNC: 141 MMOL/L — SIGNIFICANT CHANGE UP (ref 135–145)
TROPONIN I SERPL-MCNC: <0.015 NG/ML — SIGNIFICANT CHANGE UP (ref 0.01–0.04)
TROPONIN I SERPL-MCNC: <0.015 NG/ML — SIGNIFICANT CHANGE UP (ref 0.01–0.04)
WBC # BLD: 11.39 K/UL — HIGH (ref 3.8–10.5)
WBC # FLD AUTO: 11.39 K/UL — HIGH (ref 3.8–10.5)

## 2018-12-13 PROCEDURE — 71275 CT ANGIOGRAPHY CHEST: CPT | Mod: 26

## 2018-12-13 PROCEDURE — 74174 CTA ABD&PLVS W/CONTRAST: CPT | Mod: 26

## 2018-12-13 PROCEDURE — 93010 ELECTROCARDIOGRAM REPORT: CPT

## 2018-12-13 PROCEDURE — 99285 EMERGENCY DEPT VISIT HI MDM: CPT

## 2018-12-13 PROCEDURE — 71045 X-RAY EXAM CHEST 1 VIEW: CPT | Mod: 26

## 2018-12-13 RX ORDER — DIAZEPAM 5 MG
10 TABLET ORAL ONCE
Qty: 0 | Refills: 0 | Status: DISCONTINUED | OUTPATIENT
Start: 2018-12-13 | End: 2018-12-13

## 2018-12-13 RX ORDER — SODIUM CHLORIDE 9 MG/ML
1000 INJECTION INTRAMUSCULAR; INTRAVENOUS; SUBCUTANEOUS ONCE
Qty: 0 | Refills: 0 | Status: COMPLETED | OUTPATIENT
Start: 2018-12-13 | End: 2018-12-13

## 2018-12-13 RX ORDER — MORPHINE SULFATE 50 MG/1
2 CAPSULE, EXTENDED RELEASE ORAL ONCE
Qty: 0 | Refills: 0 | Status: DISCONTINUED | OUTPATIENT
Start: 2018-12-13 | End: 2018-12-13

## 2018-12-13 RX ORDER — METHOCARBAMOL 500 MG/1
2 TABLET, FILM COATED ORAL
Qty: 30 | Refills: 0
Start: 2018-12-13 | End: 2018-12-17

## 2018-12-13 RX ADMIN — Medication 10 MILLIGRAM(S): at 02:01

## 2018-12-13 RX ADMIN — SODIUM CHLORIDE 1000 MILLILITER(S): 9 INJECTION INTRAMUSCULAR; INTRAVENOUS; SUBCUTANEOUS at 04:36

## 2018-12-13 RX ADMIN — SODIUM CHLORIDE 1000 MILLILITER(S): 9 INJECTION INTRAMUSCULAR; INTRAVENOUS; SUBCUTANEOUS at 05:36

## 2018-12-13 RX ADMIN — MORPHINE SULFATE 2 MILLIGRAM(S): 50 CAPSULE, EXTENDED RELEASE ORAL at 02:18

## 2018-12-13 RX ADMIN — MORPHINE SULFATE 2 MILLIGRAM(S): 50 CAPSULE, EXTENDED RELEASE ORAL at 01:48

## 2018-12-13 RX ADMIN — SODIUM CHLORIDE 1000 MILLILITER(S): 9 INJECTION INTRAMUSCULAR; INTRAVENOUS; SUBCUTANEOUS at 06:55

## 2018-12-13 RX ADMIN — SODIUM CHLORIDE 1000 MILLILITER(S): 9 INJECTION INTRAMUSCULAR; INTRAVENOUS; SUBCUTANEOUS at 05:55

## 2018-12-13 NOTE — ED ADULT NURSE NOTE - NSIMPLEMENTINTERV_GEN_ALL_ED
Implemented All Universal Safety Interventions:  Stacy to call system. Call bell, personal items and telephone within reach. Instruct patient to call for assistance. Room bathroom lighting operational. Non-slip footwear when patient is off stretcher. Physically safe environment: no spills, clutter or unnecessary equipment. Stretcher in lowest position, wheels locked, appropriate side rails in place.

## 2018-12-13 NOTE — ED PROVIDER NOTE - OBJECTIVE STATEMENT
54 yo female c/o left sided chest pain sob and dyspnea acute onset tonight. pt also c/o 3 days of left sided neck pain radiating to her back

## 2018-12-13 NOTE — ED ADULT NURSE NOTE - OBJECTIVE STATEMENT
pt presents to the ed c/o sob / cp that began approximately 2 hrs ago, pt states the cp is more in inspiration of her breathing and also c/o R cervical pain.

## 2018-12-13 NOTE — ED ADULT NURSE NOTE - CHPI ED NUR SYMPTOMS NEG
no tingling/no nausea/no vomiting/no weakness/no chills/no fever/no decreased eating/drinking/no dizziness

## 2018-12-13 NOTE — ED PROVIDER NOTE - PROGRESS NOTE DETAILS
pt with continued pain with 1 negative troponin will get cta r/o dissection. ct showed left lower lobe pneumonia pt afebrile, non toxic. pt has inhaler at home and pulmonology follow up with Dr. Butler Attending Pope, reviewed with pt and family results of tests.  Plan d.c and follow up with PMD.

## 2018-12-13 NOTE — ED PROVIDER NOTE - CONSTITUTIONAL, MLM
normal... Well appearing, well nourished, awake, alert, oriented to person, place, time/situation and in mild distress shaking staing she cannot catch her breath

## 2019-01-22 ENCOUNTER — EMERGENCY (EMERGENCY)
Facility: HOSPITAL | Age: 54
LOS: 0 days | Discharge: HOME | End: 2019-01-22
Attending: EMERGENCY MEDICINE | Admitting: EMERGENCY MEDICINE
Payer: COMMERCIAL

## 2019-01-22 VITALS
OXYGEN SATURATION: 98 % | SYSTOLIC BLOOD PRESSURE: 141 MMHG | HEART RATE: 72 BPM | DIASTOLIC BLOOD PRESSURE: 85 MMHG | TEMPERATURE: 98 F

## 2019-01-22 VITALS — WEIGHT: 250 LBS

## 2019-01-22 DIAGNOSIS — R07.81 PLEURODYNIA: ICD-10-CM

## 2019-01-22 DIAGNOSIS — Z87.891 PERSONAL HISTORY OF NICOTINE DEPENDENCE: ICD-10-CM

## 2019-01-22 DIAGNOSIS — M06.9 RHEUMATOID ARTHRITIS, UNSPECIFIED: ICD-10-CM
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
## 2019-01-22 DIAGNOSIS — S22.41XA MULTIPLE FRACTURES OF RIBS, RIGHT SIDE, INITIAL ENCOUNTER FOR CLOSED FRACTURE: ICD-10-CM
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
## 2019-01-22 PROCEDURE — 99284 EMERGENCY DEPT VISIT MOD MDM: CPT | Mod: 25

## 2019-01-22 PROCEDURE — 71250 CT THORAX DX C-: CPT | Mod: 26

## 2019-01-22 RX ORDER — OXYCODONE AND ACETAMINOPHEN 5; 325 MG/1; MG/1
1 TABLET ORAL ONCE
Qty: 0 | Refills: 0 | Status: DISCONTINUED | OUTPATIENT
Start: 2019-01-22 | End: 2019-01-22

## 2019-01-22 RX ADMIN — OXYCODONE AND ACETAMINOPHEN 1 TABLET(S): 5; 325 TABLET ORAL at 15:20

## 2019-01-22 NOTE — ED STATDOCS - NS_ ATTENDINGSCRIBEDETAILS _ED_A_ED_FT
I, Elias Glover MD,  performed the initial face to face bedside interview with this patient regarding history of present illness, review of symptoms and relevant past medical, social and family history.  I completed an independent physical examination.    The history, relevant review of systems, past medical and surgical history, medical decision making, and physical examination was documented by the scribe in my presence and I attest to the accuracy of the documentation.

## 2019-01-22 NOTE — ED STATDOCS - ATTENDING CONTRIBUTION TO CARE
I, Elias Glover MD,  performed the initial face to face bedside interview with this patient regarding history of present illness, review of symptoms and relevant past medical, social and family history.  I completed an independent physical examination.  I was the initial provider who evaluated this patient. I have signed out the follow up of any pending tests (i.e. labs, radiological studies) to the ACP.  I have communicated the patient’s plan of care and disposition with the ACP.

## 2019-01-22 NOTE — ED ADULT NURSE NOTE - OBJECTIVE STATEMENT
PMHx of RA, PNA x2 in the last 6 months presents to the ED c/o right rib pain s/p mechanical fall 2 days ago. No LOC with fall. Rib pain is worse with palpation, breathing, movement.

## 2019-01-22 NOTE — ED STATDOCS - OBJECTIVE STATEMENT
52 y/o female with a PMHx of RA, PNA x2 in the last 6 months presents to the ED c/o right rib pain s/p mechanical fall 2 days ago. No LOC with fall. Rib pain is worse with palpation, breathing, movement. No bruising, nausea, vomiting, diarrhea. No anti-coagulant use. Former smoker. NKDA. PMD: Dr. Peña.

## 2019-01-22 NOTE — ED ADULT TRIAGE NOTE - CHIEF COMPLAINT QUOTE
Pt presents to ED c/o right rib pain s/p trip and fall on Sat. Pt denies LOC, hitting head and blood thinners. Pt O2 sat 98% on room air.

## 2019-01-22 NOTE — ED STATDOCS - PROGRESS NOTE DETAILS
BARAK Espinoza:   Patient has been seen, evaluated and orders have been written by the attending in intake. Patient is stable.  I will follow up the results of orders written and I will continue to evaluate/observe the patient. Re-eval:  Pt with 2 rib fractures (R side #(9, 10).  Neg Pthx.  Sebaceous cyst to back (throacic region).  Pt would like pain reliever now.   driving home.  Discussed cont Ibuprofen during the day since helping and I will send Percocet for breakthrough pain.  F/U with PMD.  DC home with Incentive spirometry.  Katy Espinoza PA-C

## 2019-02-26 ENCOUNTER — RESULT REVIEW (OUTPATIENT)
Age: 54
End: 2019-02-26

## 2019-07-10 PROBLEM — M06.9 RHEUMATOID ARTHRITIS, UNSPECIFIED: Chronic | Status: ACTIVE | Noted: 2019-01-26

## 2019-07-10 PROBLEM — J18.9 PNEUMONIA, UNSPECIFIED ORGANISM: Chronic | Status: ACTIVE | Noted: 2019-01-26

## 2019-07-17 ENCOUNTER — APPOINTMENT (OUTPATIENT)
Dept: GASTROENTEROLOGY | Facility: CLINIC | Age: 54
End: 2019-07-17
Payer: COMMERCIAL

## 2019-07-17 DIAGNOSIS — R19.7 DIARRHEA, UNSPECIFIED: ICD-10-CM

## 2019-07-17 PROCEDURE — 91110 GI TRC IMG INTRAL ESOPH-ILE: CPT

## 2019-07-17 NOTE — HISTORY OF PRESENT ILLNESS
[de-identified] : This is a 55 y/o female here today for a capsule study for ileitis and diarrhea. \par \par I have reviewed the risks, benefits, and alternatives of the small bowel capsule endoscopy.  I discussed with the patient including missed lesions as well as capsule retention that could possibly result in bowel obstruction to necessitate surgical removal.  The informed written consent was obtained prior to ingestion of the pill cam.  The patient ingested the small bowel capsule without difficult.  Patient tolerated the procedure well, without any immediate complications.  Real time video shows capsule in the stomach.  The patient was instructed to contact the office with any questions or concerns.  Advised if going for MRI within the month patient will need abdominal X-ray to check the capsule exit. \par \par Post capsule ingestion instructions were given to the patient.\par

## 2019-07-17 NOTE — ASSESSMENT
[FreeTextEntry1] : 53 y/o female with ileitis and diarrhea here for capsule study. \par Pt tolerated procedure well without difficulty. \par Reviewed post capsule instructions in length and repeats back to me. \par Will return back to office around 4:30pm today. \par

## 2019-08-13 ENCOUNTER — APPOINTMENT (OUTPATIENT)
Dept: GASTROENTEROLOGY | Facility: AMBULATORY MEDICAL SERVICES | Age: 54
End: 2019-08-13
Payer: COMMERCIAL

## 2019-08-13 ENCOUNTER — RESULT REVIEW (OUTPATIENT)
Age: 54
End: 2019-08-13

## 2019-08-13 PROCEDURE — 43239 EGD BIOPSY SINGLE/MULTIPLE: CPT

## 2019-08-14 ENCOUNTER — APPOINTMENT (OUTPATIENT)
Age: 54
End: 2019-08-14
Payer: COMMERCIAL

## 2019-08-14 VITALS
BODY MASS INDEX: 40.18 KG/M2 | SYSTOLIC BLOOD PRESSURE: 107 MMHG | DIASTOLIC BLOOD PRESSURE: 73 MMHG | HEART RATE: 96 BPM | HEIGHT: 66 IN | TEMPERATURE: 98 F | WEIGHT: 250 LBS

## 2019-08-14 DIAGNOSIS — Z78.9 OTHER SPECIFIED HEALTH STATUS: ICD-10-CM

## 2019-08-14 DIAGNOSIS — Z87.39 PERSONAL HISTORY OF OTHER DISEASES OF THE MUSCULOSKELETAL SYSTEM AND CONNECTIVE TISSUE: ICD-10-CM

## 2019-08-14 PROCEDURE — 99214 OFFICE O/P EST MOD 30 MIN: CPT | Mod: 25

## 2019-08-14 PROCEDURE — 73502 X-RAY EXAM HIP UNI 2-3 VIEWS: CPT | Mod: LT

## 2019-08-14 PROCEDURE — 20610 DRAIN/INJ JOINT/BURSA W/O US: CPT | Mod: LT

## 2019-08-17 NOTE — ADDENDUM
[FreeTextEntry1] : This note was written by Alley Rodriguez on 08/16/2019 acting as scribe for Tk Vázquez III, MD\par

## 2019-08-17 NOTE — PHYSICAL EXAM
[de-identified] : Right Hip: \par Hip: Range of Motion in Degrees:\par 	                                 Claimant:	   Normal:	\par Flexion (Active) 	                 120 	   120-degrees	\par Flexion (Passive)	                 120	   120-degrees	\par Extension (Active)	                 -30	   -30-degrees	\par Extension (Passive)	 -30	   -30-degrees	\par Abduction (Active)	                 45-50	   68-51-fijomad	\par Abduction (Passive)	 45-50	   45-87-[id number]	\par Adduction (Active)  	 20-30	   89-57-[id number]	\par Adduction (Passive)	 20-30	   38-04-[id number]	\par Internal Rotation (Active) 	 35	   35-degrees	\par Internal Rotation (Passive)	 35	   35-degrees	\par External Rotation (Active)	 45	   45-degrees	\par External Rotation (Passive)	 45	   45-degrees	\par \par No tenderness with internal or external rotation or axial load.  No tenderness to palpation over the greater trochanter.  Negative Trendelenburg.  No tenderness with resisted abduction.  No weakness to flexion, extension, abduction or adduction.  No evidence of instability.  No motor or sensory deficits.  2+ DP and PT pulses.  Skin is intact.  No scars, rashes or lesions.  \par  \par Left Hip:\par Hip: Range of Motion in Degrees:\par 	                                 Claimant:	          Normal:	\par Flexion (Active) 	                 120 	          120-degrees	\par Flexion (Passive)	                 120	          120-degrees	\par Extension (Active)	                 -30	          -30-degrees	\par Extension (Passive)	 -30	          -30-degrees	\par Abduction (Active)	                45-50	          24-38-viqwwnj	\par Abduction (Passive)	45-50	          19-50-hyslmfg	\par Adduction (Active)                	20-30	          30-48-ulbhiar	\par Adduction (Passive)	20-30	          34-77-otrclrg	\par Internal Rotation (Active) 	35	          35-degrees	\par Internal Rotation (Passive)	35	          35-degrees	\par External Rotation (Active)	45	          45-degrees	\par External Rotation (Passive)	45	          45-degrees	\par \par No tenderness with internal or external rotation or axial load.  Point tenderness over the greater trochanter with pain with resisted abduction.  Negative Trendelenburg.  No weakness to flexion, extension, abduction or adduction.  No evidence of instability.  No motor or sensory deficits.  2+ DP and PT pulses.  Skin is intact.  No scars, rashes or lesions.  \par   [de-identified] : Ambulating with a slightly antalgic to antalgic gait.  Station:  Normal.  [de-identified] : General Appearance:  Well-developed, well-nourished female in no acute distress. \par  [de-identified] : Radiographs, two views of the left hip and one view of the pelvis, show no obvious osseous abnormalities.

## 2019-08-17 NOTE — PROCEDURE
[de-identified] : Consent: \par At this time, I have recommended an injection to the left hip.  The risks and benefits of the procedure were discussed with the patient in detail.  Upon verbal consent of the patient, we proceeded with the injection as noted below.  \par \par Procedure:  \par After a sterile prep, the patient underwent an injection of 9 cc of 1% Lidocaine without epinephrine and 1 cc of Kenalog into the left hip.  The patient tolerated the procedure well.  There were no complications.

## 2019-08-17 NOTE — REVIEW OF SYSTEMS
[Negative] : Heme/Lymph [FreeTextEntry6] : Wheezing [FreeTextEntry9] : Joint pain; joint stiffness; joint swelling  [FreeTextEntry7] : Constipation

## 2019-08-17 NOTE — HISTORY OF PRESENT ILLNESS
[de-identified] : The patient comes in today for complaints of pain to her left hip.  She states it has been going on for the last several months.  The patient states the pain is constant and localized.  The patient describes the pain as achy and stabbing.  The patient states rest makes the pain better while stairs and walking makes the pain worse.\par \par Pain level includes a current pain level of 6/10.\par \par Ailment Interference:  \par Daily Livin/10\par Normal Work:  6/10\par Sleep:  6/10\par Enjoyment of Life:  6/10\par Climbing Stairs:  8/10\par Sports:  8/10\par Extra-Curricular Activities:  8/10 [] : No

## 2019-08-17 NOTE — DISCUSSION/SUMMARY
[de-identified] : At this time, I have recommended ice and elevation for the left hip trochanteric bursitis.  She will be reassessed in three to four weeks.

## 2019-08-29 ENCOUNTER — APPOINTMENT (OUTPATIENT)
Dept: ORTHOPEDIC SURGERY | Facility: CLINIC | Age: 54
End: 2019-08-29
Payer: COMMERCIAL

## 2019-08-29 VITALS
HEIGHT: 66 IN | HEART RATE: 103 BPM | BODY MASS INDEX: 40.18 KG/M2 | TEMPERATURE: 98 F | SYSTOLIC BLOOD PRESSURE: 107 MMHG | DIASTOLIC BLOOD PRESSURE: 71 MMHG | WEIGHT: 250 LBS

## 2019-08-29 PROCEDURE — 20610 DRAIN/INJ JOINT/BURSA W/O US: CPT | Mod: LT

## 2019-08-29 PROCEDURE — 99214 OFFICE O/P EST MOD 30 MIN: CPT | Mod: 25

## 2019-08-29 PROCEDURE — 73560 X-RAY EXAM OF KNEE 1 OR 2: CPT | Mod: LT

## 2019-09-10 NOTE — PHYSICAL EXAM
[de-identified] : Right Knee: \par Knee: Range of Motion in Degrees	\par 	                  Claimant/Normal:\par 		\par Flexion Active            135                        135-degrees\par Flexion Passive	  135	                135-degrees	\par Extension Active	  0-5	                0-5-degrees	\par Extension Passive	  0-5	                0-5-degrees	\par \par No weakness to flexion/extension.  No evidence of instability in the AP plane or varus or valgus stress.  Negative  Lachman.  Negative pivot shift.  Negative anterior drawer test.  Negative posterior drawer test.  Negative Re.  Negative Apley grind.  No medial or lateral joint line tenderness.  No tenderness over the medial and lateral facet of the patella.  No patellofemoral crepitations.  No lateral tilting patella.  No patellar apprehension.  No crepitation in the medial and lateral femoral condyle.  No proximal or distal swelling, edema or tenderness.  No gross motor or sensory deficits.  No intra-articular swelling.  2+ DP and PT pulses. No varus or valgus malalignment.  Skin is intact.  No rashes, scars or lesions.  \par  \par Left Knee:\par Knee: Range of Motion in Degrees	\par 	                  Claimant:	Normal:	\par Flexion Active	  135 	                135-degrees	\par Flexion Passive	  135	                135-degrees	\par Extension Active	  0-5	                0-5-degrees	\par Extension Passive	  0-5	                0-5-degrees	\par \par No weakness to flexion/extension. No evidence of instability in the AP plane or varus or valgus stress.  Negative  Lachman.  Negative pivot shift.  Negative anterior drawer test.  Negative posterior drawer test.  Negative Re.  Negative Apley grind.  No medial or lateral joint line tenderness.  Positive tenderness over the medial and lateral facet of the patella.  Positive patellofemoral crepitations.  No lateral tilting patella.  No patella apprehension.  Positive crepitation in the medial and lateral femoral condyle.  No proximal or distal swelling, edema or tenderness.  No gross motor or sensory deficits. Mild intra-articular swelling.  2+ DP and PT pulses. No varus or valgus malalignment.  Skin is intact.  No rashes, scars or lesions. \par  [de-identified] : General Appearance:  Well-developed, well-nourished female in no acute distress. \par  [de-identified] : Ambulating with a slightly antalgic to antalgic gait.  Station:  Normal.  [de-identified] : Radiographs, two views of the left knee, show moderate to early severe degenerative change.

## 2019-09-10 NOTE — HISTORY OF PRESENT ILLNESS
[de-identified] : Jahaira comes in today stating her left hip is feeling great.  She is having increasing complaints of pain to her left knee.

## 2019-09-10 NOTE — PROCEDURE
[de-identified] : Consent: \par The risks and benefits of the procedure were discussed with the patient in detail.  Upon verbal consent of the patient, we proceeded with the injection as noted below.  \par \par Procedure:  \par After a sterile prep, the patient underwent an injection of 9 cc of 1% Lidocaine without epinephrine and 1 cc of Kenalog into the left knee.  The patient tolerated the procedure well.  There were no complications.

## 2019-09-10 NOTE — DISCUSSION/SUMMARY
[de-identified] : At this time, I have recommended an injection to the left knee, as noted above.  I have recommended ice and elevation for the left knee osteoarthritis. She will be reassessed in three to four weeks.

## 2019-09-10 NOTE — ADDENDUM
[FreeTextEntry1] : This note was written by Alley Rodriguez on 09/08/2019 acting as scribe for Tk Vázquez III, MD

## 2019-10-03 ENCOUNTER — APPOINTMENT (OUTPATIENT)
Dept: GASTROENTEROLOGY | Facility: CLINIC | Age: 54
End: 2019-10-03
Payer: COMMERCIAL

## 2019-10-03 VITALS
SYSTOLIC BLOOD PRESSURE: 118 MMHG | HEIGHT: 66 IN | HEART RATE: 94 BPM | DIASTOLIC BLOOD PRESSURE: 79 MMHG | BODY MASS INDEX: 40.18 KG/M2 | WEIGHT: 250 LBS

## 2019-10-03 DIAGNOSIS — K59.00 CONSTIPATION, UNSPECIFIED: ICD-10-CM

## 2019-10-03 DIAGNOSIS — R10.84 GENERALIZED ABDOMINAL PAIN: ICD-10-CM

## 2019-10-03 DIAGNOSIS — R59.9 ENLARGED LYMPH NODES, UNSPECIFIED: ICD-10-CM

## 2019-10-03 DIAGNOSIS — M25.50 PAIN IN UNSPECIFIED JOINT: ICD-10-CM

## 2019-10-03 DIAGNOSIS — R14.3 FLATULENCE: ICD-10-CM

## 2019-10-03 DIAGNOSIS — R12 HEARTBURN: ICD-10-CM

## 2019-10-03 DIAGNOSIS — R19.8 OTHER SPECIFIED SYMPTOMS AND SIGNS INVOLVING THE DIGESTIVE SYSTEM AND ABDOMEN: ICD-10-CM

## 2019-10-03 DIAGNOSIS — Z78.9 OTHER SPECIFIED HEALTH STATUS: ICD-10-CM

## 2019-10-03 DIAGNOSIS — L81.8 OTHER SPECIFIED DISORDERS OF PIGMENTATION: ICD-10-CM

## 2019-10-03 DIAGNOSIS — R05 COUGH: ICD-10-CM

## 2019-10-03 DIAGNOSIS — M54.2 CERVICALGIA: ICD-10-CM

## 2019-10-03 DIAGNOSIS — J34.9 UNSPECIFIED DISORDER OF NOSE AND NASAL SINUSES: ICD-10-CM

## 2019-10-03 DIAGNOSIS — R33.9 RETENTION OF URINE, UNSPECIFIED: ICD-10-CM

## 2019-10-03 PROCEDURE — 99214 OFFICE O/P EST MOD 30 MIN: CPT

## 2019-10-03 RX ORDER — BUDESONIDE 3 MG/1
3 CAPSULE, COATED PELLETS ORAL DAILY
Qty: 90 | Refills: 3 | Status: ACTIVE | COMMUNITY
Start: 2019-10-03 | End: 1900-01-01

## 2019-10-06 PROBLEM — K59.00 CONSTIPATION, UNSPECIFIED CONSTIPATION TYPE: Status: ACTIVE | Noted: 2019-10-03

## 2019-10-06 PROBLEM — J34.9 SINUS PROBLEM: Status: ACTIVE | Noted: 2019-10-03

## 2019-10-06 PROBLEM — R59.9 SWOLLEN GLAND: Status: ACTIVE | Noted: 2019-10-03

## 2019-10-06 PROBLEM — M25.50 JOINT PAIN: Status: ACTIVE | Noted: 2019-10-03

## 2019-10-06 PROBLEM — R10.84 GENERALIZED ABDOMINAL PAIN: Status: ACTIVE | Noted: 2019-10-03

## 2019-10-06 PROBLEM — R12 HEARTBURN: Status: ACTIVE | Noted: 2019-10-03

## 2019-10-06 PROBLEM — M54.2 NECK PAIN: Status: ACTIVE | Noted: 2019-10-03

## 2019-10-06 PROBLEM — Z78.9 CAFFEINE USE: Status: ACTIVE | Noted: 2019-10-03

## 2019-10-06 PROBLEM — L81.8 HISTORY OF TATTOO: Status: ACTIVE | Noted: 2019-10-03

## 2019-10-06 PROBLEM — R33.9 URINARY RETENTION: Status: ACTIVE | Noted: 2019-10-03

## 2019-10-06 PROBLEM — R05 COUGH: Status: ACTIVE | Noted: 2019-10-03

## 2019-10-06 PROBLEM — R14.3 EXCESSIVE GAS: Status: ACTIVE | Noted: 2019-10-03

## 2019-10-06 NOTE — REVIEW OF SYSTEMS
[Fever] : no fever [Chills] : no chills [Recent Weight Gain (___ Lbs)] : no recent weight gain [Feeling Poorly] : feeling poorly [Feeling Tired] : feeling tired [Recent Weight Loss (___ Lbs)] : no recent weight loss [As Noted in HPI] : as noted in HPI [Arthralgias] : arthralgias [Joint Pain] : joint pain [Skin Lesions] : skin lesion [Anxiety] : anxiety [Negative] : Endocrine

## 2019-10-06 NOTE — PHYSICAL EXAM
[Outer Ear] : the ears and nose were normal in appearance [General Appearance - Alert] : alert [General Appearance - In No Acute Distress] : in no acute distress [Oropharynx] : the oropharynx was normal [Neck Appearance] : the appearance of the neck was normal [Neck Cervical Mass (___cm)] : no neck mass was observed [Jugular Venous Distention Increased] : there was no jugular-venous distention [Thyroid Nodule] : there were no palpable thyroid nodules [Thyroid Diffuse Enlargement] : the thyroid was not enlarged [Heart Rate And Rhythm] : heart rate was normal and rhythm regular [Heart Sounds] : normal S1 and S2 [Auscultation Breath Sounds / Voice Sounds] : lungs were clear to auscultation bilaterally [Heart Sounds Pericardial Friction Rub] : no pericardial rub [Murmurs] : no murmurs [Heart Sounds Gallop] : no gallops [Abdomen Soft] : soft [Bowel Sounds] : normal bowel sounds [Full Pulse] : the pedal pulses are present [Edema] : there was no peripheral edema [Abdomen Mass (___ Cm)] : no abdominal mass palpated [Abdomen Tenderness] : non-tender [] : no hepato-splenomegaly [Musculoskeletal - Swelling] : no joint swelling seen [Abnormal Walk] : normal gait [Nail Clubbing] : no clubbing  or cyanosis of the fingernails [Motor Tone] : muscle strength and tone were normal [Affect] : the affect was normal [Impaired Insight] : insight and judgment were intact [Oriented To Time, Place, And Person] : oriented to person, place, and time

## 2019-10-06 NOTE — ASSESSMENT
[FreeTextEntry1] : 55yo female with RA with ileocecitis of unclear significance\par While this may represent mild Crohn's disease, I am unconvinced that all her GI symptoms are related to limited ileocecal disease. As such, would not recommend biologics based on her GI disease, though would not oppose if recommended for her RA. \par will add iberogast to see if helps her symptomatically

## 2019-10-06 NOTE — HISTORY OF PRESENT ILLNESS
[de-identified] : 53yo female with altered bowel function\par She  has diagnosis of RA and had been on MTX\par She has been considering biologics and we were considering treating both RA and potential IBD \par Colonoscopy 2/19 showed moderate ileocecitis\par Capsule study essentially negative but showed some mild duodenitis without significant other small intestine inflammation

## 2019-10-10 ENCOUNTER — TRANSCRIPTION ENCOUNTER (OUTPATIENT)
Age: 54
End: 2019-10-10

## 2019-11-07 ENCOUNTER — APPOINTMENT (OUTPATIENT)
Dept: GASTROENTEROLOGY | Facility: CLINIC | Age: 54
End: 2019-11-07

## 2020-03-01 NOTE — ED STATDOCS - CPE ED RESP NORM
CC:     Interval/Overnight Events:      VITAL SIGNS:  T(C): 36.6 (03-01-20 @ 05:00), Max: 37.1 (02-29-20 @ 17:00)  HR: 78 (03-01-20 @ 07:37) (49 - 86)  BP: 106/46 (03-01-20 @ 05:00) (101/33 - 129/48)  ABP: --  ABP(mean): --  RR: 13 (03-01-20 @ 05:00) (9 - 23)  SpO2: 94% (03-01-20 @ 07:37) (92% - 100%)  CVP(mm Hg): --    ==============================RESPIRATORY========================  FiO2: 	    Mechanical Ventilation: Mode: SIMV with PS  RR (machine): 12  FiO2: 25  PEEP: 10  PS: 12  ITime: 1  MAP: 15  PC: 12  PIP: 26        Respiratory Medications:  ALBUTerol  Intermittent Nebulization - Peds 2.5 milliGRAM(s) Nebulizer every 8 hours  ALBUTerol  Intermittent Nebulization - Peds 2.5 milliGRAM(s) Nebulizer every 6 hours PRN  buDESOnide   for Nebulization - Peds 0.5 milliGRAM(s) Nebulizer every 12 hours  sodium chloride 0.9% for Nebulization - Peds 3 milliLiter(s) Nebulizer once  sodium chloride 3% for Nebulization - Peds 4 milliLiter(s) Nebulizer three times a day        ============================CARDIOVASCULAR=======================  Cardiac Rhythm:	 NSR    Cardiovascular Medications:  amLODIPine Oral Tab/Cap - Peds 5 milliGRAM(s) Oral <User Schedule>  cloNIDine 0.2 mG/24Hr(s) Transdermal Patch - Peds 2 Patch Transdermal <User Schedule>  doxazosin Oral Tab/Cap - Peds 0.5 milliGRAM(s) Oral daily  doxazosin Oral Tab/Cap - Peds 1 milliGRAM(s) Oral every 24 hours  furosemide  IV Intermittent - Peds 40 milliGRAM(s) IV Intermittent every 12 hours  hydrALAZINE IV Intermittent - Peds 7 milliGRAM(s) IV Intermittent every 4 hours PRN  labetalol  Oral Tab/Cap - Peds 150 milliGRAM(s) Oral two times a day  NIFEdipine Oral Liquid - Peds 7.5 milliGRAM(s) Oral every 4 hours PRN        =====================FLUIDS/ELECTROLYTES/NUTRITION===================  I&O's Summary    29 Feb 2020 07:01  -  01 Mar 2020 07:00  --------------------------------------------------------  IN: 1014 mL / OUT: 715 mL / NET: 299 mL      Daily   02-29    142  |  102  |  36  ----------------------------<  126  4.6   |  23  |  2.41    Ca    10.1      29 Feb 2020 20:29  Phos  5.0     02-29  Mg     2.2     02-29        Diet:     Gastrointestinal Medications:  cholecalciferol Oral Liquid - Peds 1200 Unit(s) Enteral Tube <User Schedule>  ferrous sulfate Oral Liquid - Peds 65 milliGRAM(s) Elemental Iron Enteral Tube <User Schedule>  magnesium oxide Tab/Cap - Peds 400 milliGRAM(s) Oral <User Schedule>  simethicone Oral Drops - Peds 40 milliGRAM(s) Oral four times a day      Fluid Management:  Fluid Status: [ ] Hypovolemic      [ ] Euvolemic         [ ] Fluid overloaded  Fluid Status Goal for next 24hr.:   [ ] Net Negative    ______   ml       [ ] Net Positive ____        ml      [ ] Intake=Output  [ ] No specific fluid goal  Fluid Intake Plan: ________________  Fluid Removal Plan: [ ] Not applicable  [ ] Diuretic Plan:  [ ] CRRT Plan:  [ ] Unchanged   [ ] No Fluid Removal     [ ] Prescribed weight loss of ___ml/hr.     [ ] Intake=Output       [ ] Fluid removal of ____    ml/hr.    ========================HEMATOLOGIC/ONCOLOGIC====================    ( 02-29 @ 08:31 )   PT: 46.7 SEC;   INR: 3.89   aPTT: 57.4 SEC                          8.7    17.59 )-----------( 179      ( 27 Feb 2020 09:04 )             28.5       Transfusions:	  Hematologic/Oncologic Medications:    DVT Prophylaxis:    ============================INFECTIOUS DISEASE========================  Antimicrobials/Immunologic Medications:  cefepime  IV Intermittent - Peds 1800 milliGRAM(s) IV Intermittent every 24 hours  mycophenolate mofetil  Oral Liquid - Peds 400 milliGRAM(s) Enteral Tube <User Schedule>  tacrolimus  Oral Liquid - Peds 1.1 milliGRAM(s) Oral <User Schedule>            =============================NEUROLOGY============================  Adequacy of sedation and pain control has been assessed and adjusted    SBS:  		  THANG-1:	      Neurologic Medications:  acetaminophen   Oral Liquid - Peds. 400 milliGRAM(s) Oral every 4 hours PRN  amantadine Oral Liquid - Peds 100 milliGRAM(s) Oral every 12 hours  baclofen Oral Liquid - Peds 15 milliGRAM(s) Enteral Tube every 8 hours  cannabidiol Oral Liquid - Peds 100 milliGRAM(s) Enteral Tube <User Schedule>  eslicarbazepine Oral Tab/Cap - Peds 200 milliGRAM(s) Oral every 24 hours  gabapentin Oral Liquid - Peds 300 milliGRAM(s) Oral every 12 hours  lacosamide  Oral Tab/Cap - Peds 200 milliGRAM(s) Oral two times a day      OTHER MEDICATIONS:  Endocrine/Metabolic Medications:  fludroCORTISONE Oral Tab/Cap - Peds 0.1 milliGRAM(s) Oral <User Schedule>  prednisoLONE  Oral Liquid - Peds 3 milliGRAM(s) Enteral Tube <User Schedule>    Genitourinary Medications:    Topical/Other Medications:  chlorhexidine 0.12% Oral Liquid - Peds 15 milliLiter(s) Swish and Spit two times a day  petrolatum, white/mineral oil Ophthalmic Ointment - Peds 1 Application(s) Both EYES two times a day      =======================PATIENT CARE ACCESS DEVICES===================  Peripheral IV  Central Venous Line	R	L	IJ	Fem	SC			Placed:   Arterial Line	R	L	PT	DP	Fem	Rad	Ax	Placed:   PICC:				  Broviac		  Mediport  Urinary Catheter, Date Placed:   Necessity of urinary, arterial, and venous catheters discussed    ============================PHYSICAL EXAM============================  General: 	In no acute distress  Respiratory:	Lungs clear to auscultation bilaterally. Good aeration. No rales,   .		rhonchi, retractions or wheezing. Effort even and unlabored.  CV:		Regular rate and rhythm. Normal S1/S2. No murmurs, rubs, or   .		gallop. Capillary refill < 2 seconds. Distal pulses 2+ and equal.  Abdomen:	Soft, non-distended. Bowel sounds present. No palpable   .		hepatosplenomegaly.  Skin:		No rash.  Extremities:	Warm and well perfused. No gross extremity deformities.  Neurologic:	Alert and oriented. No acute change from baseline exam.    ============================IMAGING STUDIES=========================        =============================SOCIAL=================================  Parent/Guardian is at the bedside  Patient and Parent/Guardian updated as to the progress/plan of care    The patient remains in critical and unstable condition, and requires ICU care and monitoring    The patient is improving but requires continued monitoring and adjustment of therapy    Total critical care time spent by attending physician was 35 minutes excluding procedure time. CC:     Interval/Overnight Events: Worsening respiratory status requiring escalation of vent settings last week though some weaning over the last 24 hours. Bloody tracheal secretions and blood on diaper. Coumadin on hold      VITAL SIGNS:  T(C): 36.6 (03-01-20 @ 05:00), Max: 37.1 (02-29-20 @ 17:00)  HR: 78 (03-01-20 @ 07:37) (49 - 86)  BP: 106/46 (03-01-20 @ 05:00) (101/33 - 129/48)  RR: 13 (03-01-20 @ 05:00) (9 - 23)  SpO2: 94% (03-01-20 @ 07:37) (92% - 100%)      ==============================RESPIRATORY========================      Mechanical Ventilation: Mode: SIMV with PS  RR (machine): 12  FiO2: 25  PEEP: 10  PS: 12  ITime: 1  MAP: 15  PC: 12  PIP: 26   ETCO2 in the 30's      Respiratory Medications:  ALBUTerol  Intermittent Nebulization - Peds 2.5 milliGRAM(s) Nebulizer every 8 hours  ALBUTerol  Intermittent Nebulization - Peds 2.5 milliGRAM(s) Nebulizer every 6 hours PRN  buDESOnide   for Nebulization - Peds 0.5 milliGRAM(s) Nebulizer every 12 hours  sodium chloride 0.9% for Nebulization - Peds 3 milliLiter(s) Nebulizer once  sodium chloride 3% for Nebulization - Peds 4 milliLiter(s) Nebulizer three times a day        ============================CARDIOVASCULAR=======================  Cardiac Rhythm:	 NSR    Cardiovascular Medications:  amLODIPine Oral Tab/Cap - Peds 5 milliGRAM(s) Oral <User Schedule>  cloNIDine 0.2 mG/24Hr(s) Transdermal Patch - Peds 2 Patch Transdermal <User Schedule>  doxazosin Oral Tab/Cap - Peds 0.5 milliGRAM(s) Oral daily  doxazosin Oral Tab/Cap - Peds 1 milliGRAM(s) Oral every 24 hours  furosemide  IV Intermittent - Peds 40 milliGRAM(s) IV Intermittent every 12 hours--hold for one day   hydrALAZINE IV Intermittent - Peds 7 milliGRAM(s) IV Intermittent every 4 hours PRN  labetalol  Oral Tab/Cap - Peds 150 milliGRAM(s) Oral two times a day--discontinue due to lower blood pressures  NIFEdipine Oral Liquid - Peds 7.5 milliGRAM(s) Oral every 4 hours PRN        =====================FLUIDS/ELECTROLYTES/NUTRITION===================  I&O's Summary    29 Feb 2020 07:01  -  01 Mar 2020 07:00  --------------------------------------------------------  IN: 1014 mL / OUT: 715 mL / NET: 299 mL      Daily   02-29    142  |  102  |  36  ----------------------------<  126  4.6   |  23  |  2.41    Ca    10.1      29 Feb 2020 20:29  Phos  5.0     02-29  Mg     2.2     02-29        Diet: Elecare 168 ml every 4 hours 6 X/day     Gastrointestinal Medications:  cholecalciferol Oral Liquid - Peds 1200 Unit(s) Enteral Tube <User Schedule>  ferrous sulfate Oral Liquid - Peds 65 milliGRAM(s) Elemental Iron Enteral Tube <User Schedule>  magnesium oxide Tab/Cap - Peds 400 milliGRAM(s) Oral <User Schedule>  simethicone Oral Drops - Peds 40 milliGRAM(s) Oral four times a day      Fluid Management:  Fluid Status: [ ] Hypovolemic      [X ] Euvolemic         [ ] Fluid overloaded  Fluid Status Goal for next 24hr.:   [ ] Net Negative    ______   ml       [X ] Net Positive ____        ml      [ ] Intake=Output  [ ] No specific fluid goal  Fluid Intake Plan: Continue  feeding regimen      ========================HEMATOLOGIC/ONCOLOGIC====================    ( 02-29 @ 08:31 )   PT: 46.7 SEC;   INR: 3.89   aPTT: 57.4 SEC                          8.7    17.59 )-----------( 179      ( 27 Feb 2020 09:04 )             28.5       Hematologic/Oncologic Medications: Coumadin on hold due to high INR.          ============================INFECTIOUS DISEASE========================  Antimicrobials/Immunologic Medications:  cefepime  IV Intermittent - Peds 1800 milliGRAM(s) IV Intermittent every 24 hours  mycophenolate mofetil  Oral Liquid - Peds 400 milliGRAM(s) Enteral Tube   tacrolimus  Oral Liquid - Peds 1.1 milliGRAM(s) Oral -level pending  Consider steroids if Scr continues to trend up.       Repeat EBV PCR      =============================NEUROLOGY============================    Neurologic Medications:  acetaminophen   Oral Liquid - Peds. 400 milliGRAM(s) Oral every 4 hours PRN  amantadine Oral Liquid - Peds 100 milliGRAM(s) Oral every 12 hours  baclofen Oral Liquid - Peds 15 milliGRAM(s) Enteral Tube every 8 hours  cannabidiol Oral Liquid - Peds 100 milliGRAM(s) Enteral Tube <User Schedule>  eslicarbazepine Oral Tab/Cap - Peds 200 milliGRAM(s) Oral every 24 hours  gabapentin Oral Liquid - Peds 300 milliGRAM(s) Oral every 12 hours  lacosamide  Oral Tab/Cap - Peds 200 milliGRAM(s) Oral two times a day      OTHER MEDICATIONS:  Endocrine/Metabolic Medications:  fludroCORTISONE Oral Tab/Cap - Peds 0.1 milliGRAM(s) Oral <User Schedule>  prednisoLONE  Oral Liquid - Peds 3 milliGRAM(s) Enteral Tube <User Schedule>    Genitourinary Medications:    Topical/Other Medications:  chlorhexidine 0.12% Oral Liquid - Peds 15 milliLiter(s) Swish and Spit two times a day  petrolatum, white/mineral oil Ophthalmic Ointment - Peds 1 Application(s) Both EYES two times a day      =======================PATIENT CARE ACCESS DEVICES===================  Peripheral IV      ============================PHYSICAL EXAM============================  General: 	In no acute distress  Respiratory:	Lungs clear to auscultation bilaterally. Good aeration. No rales,   .		rhonchi, retractions or wheezing. Effort even and unlabored.  CV:		Regular rate and rhythm. Normal S1/S2. No murmurs, rubs, or   .		gallop. Capillary refill < 2 seconds. Distal pulses 2+ and equal.  Abdomen:	Soft, non-distended. Bowel sounds present. No palpable   .		hepatosplenomegaly.  Skin:		No rash.  Extremities:	Warm and well perfused. No gross extremity deformities.  Neurologic:	Alert and oriented. No acute change from baseline exam.    ============================IMAGING STUDIES=========================        =============================SOCIAL=================================  Parent/Guardian is at the bedside  Patient and Parent/Guardian updated as to the progress/plan of care    The patient remains in critical and unstable condition, and requires ICU care and monitoring    The patient is improving but requires continued monitoring and adjustment of therapy    Total critical care time spent by attending physician was 35 minutes excluding procedure time. CC:     Interval/Overnight Events: Worsening respiratory status requiring escalation of vent settings last week though some weaning over the last 24 hours. Bloody tracheal secretions and blood on diaper. Coumadin on hold due to high INR and bloody tracheal secretions      VITAL SIGNS:  T(C): 36.6 (03-01-20 @ 05:00), Max: 37.1 (02-29-20 @ 17:00)  HR: 78 (03-01-20 @ 07:37) (49 - 86)  BP: 106/46 (03-01-20 @ 05:00) (101/33 - 129/48)  RR: 13 (03-01-20 @ 05:00) (9 - 23)  SpO2: 94% (03-01-20 @ 07:37) (92% - 100%)      ==============================RESPIRATORY========================      Mechanical Ventilation: Mode: SIMV with PS  RR (machine): 12  FiO2: 25  PEEP: 10  PS: 12  ITime: 1  MAP: 15  PC: 12  PIP: 26   ETCO2 in the 30's      Respiratory Medications:  ALBUTerol  Intermittent Nebulization - Peds 2.5 milliGRAM(s) Nebulizer every 8 hours  ALBUTerol  Intermittent Nebulization - Peds 2.5 milliGRAM(s) Nebulizer every 6 hours PRN  buDESOnide   for Nebulization - Peds 0.5 milliGRAM(s) Nebulizer every 12 hours  sodium chloride 0.9% for Nebulization - Peds 3 milliLiter(s) Nebulizer once  sodium chloride 3% for Nebulization - Peds 4 milliLiter(s) Nebulizer three times a day        ============================CARDIOVASCULAR=======================  Cardiac Rhythm:	 Normal sinus rhythm    Cardiovascular Medications:  amLODIPine Oral Tab/Cap - Peds 5 milliGRAM(s) Oral twice daily - hold for BPS<110/55  cloNIDine 0.2 mG/24Hr(s) Transdermal Patch - Peds 2 Patch Transdermal   doxazosin Oral Tab/Cap - Peds 0.5 milliGRAM(s) Oral daily  doxazosin Oral Tab/Cap - Peds 1 milliGRAM(s) Oral every 24 hours  furosemide  IV Intermittent - Peds 40 milliGRAM(s) IV Intermittent every 12 hours--hold for one day   hydrALAZINE IV Intermittent - Peds 7 milliGRAM(s) IV Intermittent every 4 hours PRN  labetalol  Oral Tab/Cap - Peds 150 milliGRAM(s) Oral two times a day--discontinued due to lower blood pressures  NIFEdipine Oral Liquid - Peds 7.5 milliGRAM(s) Oral every 4 hours PRN        =====================FLUIDS/ELECTROLYTES/NUTRITION===================  I&O's Summary    29 Feb 2020 07:01  -  01 Mar 2020 07:00  --------------------------------------------------------  IN: 1014 mL / OUT: 715 mL / NET: 299 mL      Daily   02-29    142  |  102  |  36  ----------------------------<  126  4.6   |  23  |  2.41    Ca    10.1      29 Feb 2020 20:29  Phos  5.0     02-29  Mg     2.2     02-29        Diet: Elecare 168 ml every 4 hours 6 X/day     Gastrointestinal Medications:  cholecalciferol Oral Liquid - Peds 1200 Unit(s) Enteral Tube <User Schedule>  ferrous sulfate Oral Liquid - Peds 65 milliGRAM(s) Elemental Iron Enteral Tube <User Schedule>  magnesium oxide Tab/Cap - Peds 400 milliGRAM(s) Oral <User Schedule>  simethicone Oral Drops - Peds 40 milliGRAM(s) Oral four times a day      Fluid Management:  Fluid Status: [ X] Hypovolemic --high BUN and Scr and soft BPS (Hypertensive at baseline)  Fluid Status Goal for next 24hr.:   [ ] Net Negative    ______   ml       [X ] Net Positive ____        ml      [ ] Intake=Output  [ ] No specific fluid goal  Fluid Intake Plan: Continue current feeding regimen--free water on hold      ========================HEMATOLOGIC/ONCOLOGIC====================    ( 02-29 @ 08:31 )   PT: 46.7 SEC;   INR: 3.89   aPTT: 57.4 SEC                          8.7    17.59 )-----------( 179      ( 27 Feb 2020 09:04 )             28.5       Hematologic/Oncologic Medications: Coumadin on hold due to high INR.          ============================INFECTIOUS DISEASE========================  Antimicrobials/Immunologic Medications:  cefepime  IV Intermittent - Peds 1800 milliGRAM(s) IV Intermittent every 24 hours  mycophenolate mofetil  Oral Liquid - Peds 400 milliGRAM(s) Enteral Tube   tacrolimus  Oral Liquid - Peds 1.1 milliGRAM(s) Oral -level pending  prednisoLONE  Oral Liquid - Peds 3 milliGRAM(s) Enteral Tub  Consider  pulse steroids if Scr continues to trend up.     Tracheal CUlture and Gram stain 2/26 and 2/27: 2+ WBC and Pseudomonas + (moderate growth on 2/26)  Repeat EBV PCR      =============================NEUROLOGY============================    Neurologic Medications:  acetaminophen   Oral Liquid - Peds. 400 milliGRAM(s) Oral every 4 hours PRN  amantadine Oral Liquid - Peds 100 milliGRAM(s) Oral every 12 hours  baclofen Oral Liquid - Peds 15 milliGRAM(s) Enteral Tube every 8 hours  cannabidiol Oral Liquid - Peds 100 milliGRAM(s) Enteral Tube <User Schedule>  eslicarbazepine Oral Tab/Cap - Peds 200 milliGRAM(s) Oral every 24 hours  gabapentin Oral Liquid - Peds 300 milliGRAM(s) Oral every 12 hours  lacosamide  Oral Tab/Cap - Peds 200 milliGRAM(s) Oral two times a day      OTHER MEDICATIONS:  Endocrine/Metabolic Medications:  fludroCORTISONE Oral Tab/Cap - Peds 0.1 milliGRAM(s) Oral <User Schedule>  prednisoLONE  Oral Liquid - Peds 3 milliGRAM(s) Enteral Tube       Topical/Other Medications:  chlorhexidine 0.12% Oral Liquid - Peds 15 milliLiter(s) Swish and Spit two times a day  petrolatum, white/mineral oil Ophthalmic Ointment - Peds 1 Application(s) Both EYES two times a day      =======================PATIENT CARE ACCESS DEVICES===================  Peripheral IV      ============================PHYSICAL EXAM============================  General: 	In no acute distress. Tracheostomy in place and on mechanical ventilation.   Respiratory:	Lungs clear to auscultation bilaterally. Good aeration. No rales,   .		rhonchi, retractions or wheezing. Effort even and unlabored.  CV:		Regular rate and rhythm. Normal S1/S2. No murmurs, rubs, or   .		gallop. Capillary refill < 2 seconds. Distal pulses 2+ and equal.  Abdomen:	Soft, non-distended, non-tender. Bowel sounds present. No palpable   .		hepatosplenomegaly. GT and Ostomy in place  Skin:		No rash.  Extremities:	Warm and well perfused. No gross extremity deformities.  Neurologic:	Alert. No acute change from baseline exam.    ============================IMAGING STUDIES=========================  < from: Xray Chest 1 View- PORTABLE-Routine (03.01.20 @ 01:48) >  FINDINGS:  Tracheostomy tube is in place. The cardiothymic silhouette is enlarged. Bilateral airspace disease is again noted with slight improved aeration when compared to the prior study. Right costophrenic angle is excluded. There is no large pneumothorax or pleural effusion. Scoliotic deformity of the spine is again noted. Gastrostomytube is in left upper quadrant.    IMPRESSION:  Slight improved aeration when compared to the prior study.    < end of copied text >        =============================SOCIAL=================================  Parent/Guardian is at the bedside  Patient and Parent/Guardian updated as to the progress/plan of care    The patient remains in critical and unstable condition, and requires ICU care and monitoring    The patient is improving but requires continued monitoring and adjustment of therapy    Total critical care time spent by attending physician was 35 minutes excluding procedure time. normal...

## 2020-05-18 ENCOUNTER — APPOINTMENT (OUTPATIENT)
Dept: ORTHOPEDIC SURGERY | Facility: CLINIC | Age: 55
End: 2020-05-18
Payer: COMMERCIAL

## 2020-05-18 VITALS
TEMPERATURE: 97.8 F | WEIGHT: 250 LBS | HEART RATE: 76 BPM | HEIGHT: 66 IN | SYSTOLIC BLOOD PRESSURE: 118 MMHG | DIASTOLIC BLOOD PRESSURE: 79 MMHG | BODY MASS INDEX: 40.18 KG/M2

## 2020-05-18 PROCEDURE — 99213 OFFICE O/P EST LOW 20 MIN: CPT

## 2020-05-18 PROCEDURE — 73560 X-RAY EXAM OF KNEE 1 OR 2: CPT | Mod: LT

## 2020-05-18 RX ORDER — DICLOFENAC SODIUM 10 MG/G
1 GEL TOPICAL DAILY
Qty: 1 | Refills: 1 | Status: ACTIVE | COMMUNITY
Start: 2020-05-18 | End: 1900-01-01

## 2020-05-19 RX ORDER — HYALURONATE SODIUM 20 MG/2 ML
20 SYRINGE (ML) INTRAARTICULAR
Qty: 3 | Refills: 0 | Status: ACTIVE | OUTPATIENT
Start: 2020-05-19

## 2020-05-20 NOTE — ADDENDUM
[FreeTextEntry1] : This note was written by Judith Sullivan on 05/19/2020 acting as a scribe for HARVEY FLANNERY III, MD

## 2020-05-20 NOTE — PHYSICAL EXAM
[de-identified] : Left Knee: \par Range of Motion in Degrees	\par 	                  Claimant:	Normal:	\par Flexion Active	  135 	                135-degrees	\par Flexion Passive	  135	                135-degrees	\par Extension Active	  0-5	                0-5-degrees	\par Extension Passive	  0-5	                0-5-degrees	\par \par No weakness to flexion/extension.  No evidence of instability in the AP plane or varus or valgus stress.  Negative  Lachman.  Negative pivot shift.  Negative anterior drawer test.  Negative posterior drawer test.  Negative Re.  Negative Apley grind.  No medial or lateral joint line tenderness.  Positive tenderness over the medial and lateral facet of the patella.  Positive patellofemoral crepitations.  No lateral tilting patella.  No patella apprehension.  Positive crepitation in the medial and lateral femoral condyle.  No proximal or distal swelling, edema or tenderness.  No gross motor or sensory deficits.  Mild intra-articular swelling.  2+ DP and PT pulses.  Moderate varus deformity.  Skin is intact.  No rashes, scars or lesions.  \par  [de-identified] : Appearance:  Well-developed, well-nourished female in no acute distress.\par  [de-identified] : Ambulating with a slightly antalgic to antalgic gait.  Station:  Normal.  [de-identified] : Radiographs, two views of the left knee, show near bone-on-bone medial compartment arthritis.

## 2020-05-20 NOTE — DISCUSSION/SUMMARY
[de-identified] : At this time, due to osteoarthritis of the left knee, I recommended a course of viscosupplementation and topical anti-inflammatory cream.  She will be reassessed, hopefully within the week.

## 2020-05-20 NOTE — HISTORY OF PRESENT ILLNESS
[de-identified] : The patient comes in today for her left knee. She states she has some pain on the lateral aspect of her hip, but increasing complaints of pain to her left knee.

## 2020-06-01 ENCOUNTER — APPOINTMENT (OUTPATIENT)
Dept: ORTHOPEDIC SURGERY | Facility: CLINIC | Age: 55
End: 2020-06-01
Payer: COMMERCIAL

## 2020-06-01 VITALS — TEMPERATURE: 98 F

## 2020-06-01 PROCEDURE — 20610 DRAIN/INJ JOINT/BURSA W/O US: CPT | Mod: LT

## 2020-06-03 NOTE — PROCEDURE
[de-identified] : Physical Examination:\par Left Knee: \par Range of Motion in Degrees	\par 	  Claimant:	Normal:	\par Flexion Active	 135 	 135-degrees	\par Flexion Passive	 135	 135-degrees	\par Extension Active	 0-5	 0-5-degrees	\par Extension Passive	 0-5	 0-5-degrees	\par \par No weakness to flexion/extension. No evidence of instability in the AP plane or varus or valgus stress. Negative Lachman. Negative pivot shift. Negative anterior drawer test. Negative posterior drawer test. Negative Re. Negative Apley grind. No medial or lateral joint line tenderness. Positive tenderness over the medial and lateral facet of the patella. Positive patellofemoral crepitations. No lateral tilting patella. No patella apprehension. Positive crepitation in the medial and lateral femoral condyle. No proximal or distal swelling, edema or tenderness. No gross motor or sensory deficits. Mild intra-articular swelling. 2+ DP and PT pulses. Moderate varus deformity. Skin is intact. No rashes, scars or lesions. \par \par Impression: \par Osteoarthritis of the left knee\par \par Consent:\par The risks and benefits of the procedure were discussed with the patient in detail.  Upon verbal consent of the patient, we proceeded with the Euflexxa injection as noted below.  \par \par Procedure:\par Under sterile conditions, the patient underwent a Euflexxa injection to the left knee of 20 mg sodium Hyaluronate, 17 mg sodium chloride, 1.12 mg disodium hydrogen phosphate dodecahydrate, .10 mg sodium dihydrogen phosphate dehydrate in a 2 ml syringe without any complications.  The patient tolerated this well. \par \par NDC#:  [address]-5\par Lot#:    [id number]\par Exp Date:  11/02/20\par \par Plan: \par I have recommended ice and elevation.  The patient will be reassessed in one week for the next Euflexxa injection for the osteoarthritis of the left knee.

## 2020-06-03 NOTE — REASON FOR VISIT
[Procedure Visit] : a procedure visit for [FreeTextEntry2] : the first Euflexxa injection to the left knee.

## 2020-06-03 NOTE — ADDENDUM
[FreeTextEntry1] : This note was written by Jazmyn Carranza on 06/03/2020 acting as scribe for Tk Vázquez III, MD

## 2020-06-08 ENCOUNTER — APPOINTMENT (OUTPATIENT)
Dept: ORTHOPEDIC SURGERY | Facility: CLINIC | Age: 55
End: 2020-06-08
Payer: COMMERCIAL

## 2020-06-08 VITALS — TEMPERATURE: 97.7 F

## 2020-06-08 PROCEDURE — 20610 DRAIN/INJ JOINT/BURSA W/O US: CPT | Mod: LT

## 2020-06-10 NOTE — ADDENDUM
[FreeTextEntry1] : This note was written by Mendez Gonsalves on 06/09/2020, acting as a scribe for Tk Vázquez III, MD

## 2020-06-10 NOTE — PROCEDURE
[de-identified] : \par Physical Examination:\par Left Knee:\par Knee: Range of Motion in Degrees  	\par    	                 Claimant:  	Normal:  	\par Flexion Active  	 135     	                135-degrees  	\par Flexion Passive  	 135  	                135-degrees  	\par Extension Active  	 0-5  	                0-5-degrees  	\par Extension Passive   0-5  	                0-5-degrees  	\par \par No weakness to flexion/extension.  No evidence of instability in the AP plane or varus or valgus stress.  Negative  Lachman. Negative pivot shift.  Negative anterior drawer test.  Negative posterior drawer test.  Negative Re.  Negative Apley grind.  No medial or lateral joint line tenderness. Positive tenderness over the medial and lateral facet of the patella. Positive patellofemoral crepitations. No lateral tilting patella. No patellar apprehension. Positive crepitation in the medial and lateral femoral condyle. No proximal or distal swelling, edema or tenderness. No gross motor or sensory deficits.  Mild intra-articular swelling.  Moderate varus deformity.  2+ DP and PT pulses. Skin is intact. No rashes, scars or lesions. \par  \par Impression: \par Osteoarthritis of the left knee\par \par Consent:\par The risks and benefits of the procedure were discussed with the patient in detail.  Upon verbal consent of the patient, we proceeded with the Euflexxa injection as noted below.  \par \par Procedure:\par Under sterile conditions, the patient underwent a Euflexxa injection to the left knee of 20 mg sodium Hyaluronate, 17 mg sodium chloride, 1.12 mg disodium hydrogen phosphate dodecahydrate, .10 mg sodium dihydrogen phosphate dehydrate in a 2 mL syringe without any complications. The patient tolerated this well. \par \par NDC#:  [address]-4\par Lot#:    [id number]\par Exp. Date:  11/02/2020\par \par Plan: \par I have recommended ice and elevation.  The patient will be reassessed in one week for the next Euflexxa injection for the osteoarthritis of the left knee.\par

## 2020-06-15 ENCOUNTER — APPOINTMENT (OUTPATIENT)
Dept: ORTHOPEDIC SURGERY | Facility: CLINIC | Age: 55
End: 2020-06-15
Payer: COMMERCIAL

## 2020-06-15 VITALS — TEMPERATURE: 97.6 F

## 2020-06-15 PROCEDURE — 20610 DRAIN/INJ JOINT/BURSA W/O US: CPT | Mod: LT

## 2020-06-17 NOTE — PROCEDURE
[de-identified] : \par Physical Examination:\par Left Knee:\par Knee: Range of Motion in Degrees  	\par    	                 Claimant:  	Normal:  	\par Flexion Active  	 135     	                135-degrees  	\par Flexion Passive  	 135  	                135-degrees  	\par Extension Active  	 0-5  	                0-5-degrees  	\par Extension Passive   0-5  	                0-5-degrees  	\par \par No weakness to flexion/extension.  No evidence of instability in the AP plane or varus or valgus stress.  Negative  Lachman. Negative pivot shift.  Negative anterior drawer test.  Negative posterior drawer test.  Negative Re.  Negative Apley grind.  No medial or lateral joint line tenderness. Positive tenderness over the medial and lateral facet of the patella. Positive patellofemoral crepitations. No lateral tilting patella. No patellar apprehension. Positive crepitation in the medial and lateral femoral condyle. No proximal or distal swelling, edema or tenderness. No gross motor or sensory deficits.  Mild intra-articular swelling.  Moderate varus deformity.  2+ DP and PT pulses. Skin is intact. No rashes, scars or lesions. \par  \par Impression: \par Osteoarthritis of the left knee\par \par Consent:\par The risks and benefits of the procedure were discussed with the patient in detail.  Upon verbal consent of the patient, we proceeded with the Euflexxa injection as noted below.  \par \par Procedure:\par Under sterile conditions, the patient underwent a Euflexxa injection to the left knee of 20 mg sodium Hyaluronate, 17 mg sodium chloride, 1.12 mg disodium hydrogen phosphate dodecahydrate, .10 mg sodium dihydrogen phosphate dehydrate in a 2 mL syringe without any complications. The patient tolerated this well. \par \par NDC#:  [address]-0\par Lot#:    [id number]\par Exp. Date:  11/02/2020\par \par Plan: \par I have recommended ice and elevation.  The patient will be reassessed in 6-8 weeks for osteoarthritis of the left knee.\par

## 2020-06-17 NOTE — ADDENDUM
[FreeTextEntry1] : This note was written by Mendez Gonsalves on 06/16/2020, acting as a scribe for Tk Vázquez III, MD

## 2020-06-22 ENCOUNTER — APPOINTMENT (OUTPATIENT)
Dept: ORTHOPEDIC SURGERY | Facility: CLINIC | Age: 55
End: 2020-06-22

## 2020-06-23 ENCOUNTER — APPOINTMENT (OUTPATIENT)
Dept: ORTHOPEDIC SURGERY | Facility: CLINIC | Age: 55
End: 2020-06-23
Payer: COMMERCIAL

## 2020-06-23 VITALS
BODY MASS INDEX: 40.18 KG/M2 | HEIGHT: 66 IN | HEART RATE: 80 BPM | DIASTOLIC BLOOD PRESSURE: 78 MMHG | TEMPERATURE: 97.7 F | SYSTOLIC BLOOD PRESSURE: 116 MMHG | WEIGHT: 250 LBS

## 2020-06-23 PROCEDURE — 20610 DRAIN/INJ JOINT/BURSA W/O US: CPT

## 2020-06-23 PROCEDURE — 99214 OFFICE O/P EST MOD 30 MIN: CPT | Mod: 25

## 2020-06-23 PROCEDURE — 73522 X-RAY EXAM HIPS BI 3-4 VIEWS: CPT

## 2020-06-24 NOTE — PROCEDURE
[de-identified] : Consent: \par At this time, I have recommended an injection to the bilateral hips.  The risks and benefits of the procedure were discussed with the patient in detail.  Upon verbal consent of the patient, we proceeded with the injections as noted below.  \par \par Procedure #1:  \par After a sterile prep, the patient underwent an injection of 9 cc of 1% Lidocaine without epinephrine and 1 cc of Kenalog into the right bursa.  The patient tolerated the procedure well.  There were no complications. \par \par Procedure #2:  \par After a sterile prep, the patient underwent an injection of 9 cc of 1% Lidocaine without epinephrine and 1 cc of Kenalog into the left bursa.  The patient tolerated the procedure well.  There were no complications.

## 2020-06-24 NOTE — DISCUSSION/SUMMARY
[de-identified] : The patient was given a cortisone injection to the right and left bursa area for the bilateral hip trochanteric bursitis, as noted above.  She had instant relief.  She is advised to ice them both.  She is advised to return to the office as needed.

## 2020-06-24 NOTE — PHYSICAL EXAM
[de-identified] : Right Hip: \par Hip: Range of Motion in Degrees:\par 	                                 Claimant:	          Normal:	\par Flexion (Active) 	                 120 	          120-degrees	\par Flexion (Passive)	                 120	          120-degrees	\par Extension (Active)	                 -30	          -30-degrees	\par Extension (Passive)	 -30	          -30-degrees	\par Abduction (Active)	                45-50	          11-23-jrvxehw	\par Abduction (Passive)	45-50	          20-18-vqkhdga	\par Adduction (Active)                	20-30	          33-92-nuftklq	\par Adduction (Passive)	20-30	          17-69-flwrxjb	\par Internal Rotation (Active) 	35	          35-degrees	\par Internal Rotation (Passive)	35	          35-degrees	\par External Rotation (Active)	45	          45-degrees	\par External Rotation (Passive)	45	          45-degrees	\par \par No tenderness with internal or external rotation or axial load.  Point tenderness over the greater trochanter with pain with resisted abduction.  Negative Trendelenburg.  No weakness to flexion, extension, abduction or adduction.  No evidence of instability.  No motor or sensory deficits.  2+ DP and PT pulses.  Skin is intact.  No scars, rashes or lesions.  \par  \par Left Hip: \par Hip: Range of Motion in Degrees:\par 	                                 Claimant:	          Normal:	\par Flexion (Active) 	                 120 	          120-degrees	\par Flexion (Passive)	                 120	          120-degrees	\par Extension (Active)	                 -30	          -30-degrees	\par Extension (Passive)	 -30	          -30-degrees	\par Abduction (Active)	                45-50	          55-03-egiganw	\par Abduction (Passive)	45-50	          99-22-zewzgas	\par Adduction (Active)                	20-30	          49-78-zdastsx	\par Adduction (Passive)	20-30	          09-12-tckvbrq	\par Internal Rotation (Active) 	35	          35-degrees	\par Internal Rotation (Passive)	35	          35-degrees	\par External Rotation (Active)	45	          45-degrees	\par External Rotation (Passive)	45	          45-degrees	\par \par No tenderness with internal or external rotation or axial load.  Point tenderness over the greater trochanter with pain with resisted abduction.  Negative Trendelenburg.  No weakness to flexion, extension, abduction or adduction.  No evidence of instability.  No motor or sensory deficits.  2+ DP and PT pulses.  Skin is intact.  No scars, rashes or lesions.  \par   [de-identified] : She walks with a normal gait pattern. [de-identified] : General Appearance:  Well-developed, well-nourished female in no acute distress. \par  [de-identified] : X-ray examination, one view of the right hip, one view of the left hip and one view of the pelvis, reveals no acute fractures or dislocations.

## 2020-06-24 NOTE — ADDENDUM
[FreeTextEntry1] : This note was written by Alley Rodriguez on 06/24/2020 acting as scribe for Anayeli Eller, CARLA/L, PA

## 2020-07-24 NOTE — ED ADULT TRIAGE NOTE - CADM TRG TX PRIOR TO ARRIVAL
Problem: Falls - Risk of  Goal: *Absence of Falls  Description: Document Venus Mayberry Fall Risk and appropriate interventions in the flowsheet.   Outcome: Progressing Towards Goal  Note: Fall Risk Interventions:            Medication Interventions: Assess postural VS orthostatic hypotension, Patient to call before getting OOB, Teach patient to arise slowly    Elimination Interventions: Call light in reach, Patient to call for help with toileting needs, Stay With Me (per policy), Toilet paper/wipes in reach, Toileting schedule/hourly rounds none

## 2020-12-03 NOTE — ED ADULT TRIAGE NOTE - SOURCE OF INFORMATION
Patient Finasteride Pregnancy And Lactation Text: This medication is absolutely contraindicated during pregnancy. It is unknown if it is excreted in breast milk.

## 2020-12-05 ENCOUNTER — OUTPATIENT (OUTPATIENT)
Dept: OUTPATIENT SERVICES | Facility: HOSPITAL | Age: 55
LOS: 1 days | End: 2020-12-05
Payer: COMMERCIAL

## 2020-12-05 DIAGNOSIS — Z11.59 ENCOUNTER FOR SCREENING FOR OTHER VIRAL DISEASES: ICD-10-CM

## 2020-12-05 LAB — SARS-COV-2 RNA SPEC QL NAA+PROBE: SIGNIFICANT CHANGE UP

## 2020-12-05 PROCEDURE — U0003: CPT

## 2020-12-06 DIAGNOSIS — Z11.59 ENCOUNTER FOR SCREENING FOR OTHER VIRAL DISEASES: ICD-10-CM

## 2021-06-27 NOTE — PHYSICAL EXAM
[No Acute Distress] : no acute distress [Well Nourished] : well nourished [Well Developed] : well developed [Well-Appearing] : well-appearing [Normal Sclera/Conjunctiva] : normal sclera/conjunctiva [PERRL] : pupils equal round and reactive to light [Normal Outer Ear/Nose] : the outer ears and nose were normal in appearance [EOMI] : extraocular movements intact [Normal Oropharynx] : the oropharynx was normal [No JVD] : no jugular venous distention [No Lymphadenopathy] : no lymphadenopathy [Supple] : supple [Thyroid Normal, No Nodules] : the thyroid was normal and there were no nodules present [No Respiratory Distress] : no respiratory distress  [No Accessory Muscle Use] : no accessory muscle use [Clear to Auscultation] : lungs were clear to auscultation bilaterally [Regular Rhythm] : with a regular rhythm [Normal Rate] : normal rate  [Normal S1, S2] : normal S1 and S2 [No Murmur] : no murmur heard [No Carotid Bruits] : no carotid bruits [No Abdominal Bruit] : a ~M bruit was not heard ~T in the abdomen [No Varicosities] : no varicosities [Pedal Pulses Present] : the pedal pulses are present [No Edema] : there was no peripheral edema [No Extremity Clubbing/Cyanosis] : no extremity clubbing/cyanosis [No Palpable Aorta] : no palpable aorta [Soft] : abdomen soft [Non Tender] : non-tender [Non-distended] : non-distended [No HSM] : no HSM [No Masses] : no abdominal mass palpated [Normal Bowel Sounds] : normal bowel sounds [Normal Posterior Cervical Nodes] : no posterior cervical lymphadenopathy [Normal Anterior Cervical Nodes] : no anterior cervical lymphadenopathy [No Spinal Tenderness] : no spinal tenderness [No CVA Tenderness] : no CVA  tenderness [No Joint Swelling] : no joint swelling [Grossly Normal Strength/Tone] : grossly normal strength/tone [No Rash] : no rash [Coordination Grossly Intact] : coordination grossly intact [No Focal Deficits] : no focal deficits [Normal Gait] : normal gait [Deep Tendon Reflexes (DTR)] : deep tendon reflexes were 2+ and symmetric [Normal Affect] : the affect was normal [Normal Insight/Judgement] : insight and judgment were intact

## 2021-06-28 ENCOUNTER — NON-APPOINTMENT (OUTPATIENT)
Age: 56
End: 2021-06-28

## 2021-06-28 ENCOUNTER — APPOINTMENT (OUTPATIENT)
Dept: FAMILY MEDICINE | Facility: CLINIC | Age: 56
End: 2021-06-28
Payer: COMMERCIAL

## 2021-06-28 VITALS
DIASTOLIC BLOOD PRESSURE: 80 MMHG | SYSTOLIC BLOOD PRESSURE: 130 MMHG | HEART RATE: 72 BPM | WEIGHT: 251 LBS | BODY MASS INDEX: 40.34 KG/M2 | HEIGHT: 66 IN | OXYGEN SATURATION: 98 %

## 2021-06-28 DIAGNOSIS — Z00.00 ENCOUNTER FOR GENERAL ADULT MEDICAL EXAMINATION W/OUT ABNORMAL FINDINGS: ICD-10-CM

## 2021-06-28 DIAGNOSIS — Z01.810 ENCOUNTER FOR PREPROCEDURAL CARDIOVASCULAR EXAMINATION: ICD-10-CM

## 2021-06-28 PROCEDURE — 99072 ADDL SUPL MATRL&STAF TM PHE: CPT

## 2021-06-28 PROCEDURE — 36415 COLL VENOUS BLD VENIPUNCTURE: CPT

## 2021-06-28 PROCEDURE — 93000 ELECTROCARDIOGRAM COMPLETE: CPT

## 2021-06-28 PROCEDURE — 99396 PREV VISIT EST AGE 40-64: CPT | Mod: 25

## 2021-06-29 PROBLEM — Z01.810 ENCOUNTER FOR PREPROCEDURAL CARDIOVASCULAR EXAMINATION: Status: RESOLVED | Noted: 2021-06-29 | Resolved: 2021-07-13

## 2021-06-29 LAB
25(OH)D3 SERPL-MCNC: 23.4 NG/ML
ALBUMIN SERPL ELPH-MCNC: 4.3 G/DL
ALP BLD-CCNC: 111 U/L
ALT SERPL-CCNC: 14 U/L
ANION GAP SERPL CALC-SCNC: 11 MMOL/L
AST SERPL-CCNC: 14 U/L
BASOPHILS # BLD AUTO: 0.07 K/UL
BASOPHILS NFR BLD AUTO: 0.9 %
BILIRUB SERPL-MCNC: 0.2 MG/DL
BUN SERPL-MCNC: 16 MG/DL
CALCIUM SERPL-MCNC: 9.5 MG/DL
CHLORIDE SERPL-SCNC: 103 MMOL/L
CHOLEST SERPL-MCNC: 243 MG/DL
CO2 SERPL-SCNC: 24 MMOL/L
COVID-19 SPIKE DOMAIN ANTIBODY INTERPRETATION: POSITIVE
CREAT SERPL-MCNC: 0.75 MG/DL
EOSINOPHIL # BLD AUTO: 0.33 K/UL
EOSINOPHIL NFR BLD AUTO: 4.1 %
ESTRADIOL SERPL-MCNC: 13 PG/ML
FSH SERPL-MCNC: 44.5 IU/L
GLUCOSE SERPL-MCNC: 105 MG/DL
HCT VFR BLD CALC: 42.3 %
HDLC SERPL-MCNC: 59 MG/DL
HGB BLD-MCNC: 13.3 G/DL
IMM GRANULOCYTES NFR BLD AUTO: 0.4 %
LDLC SERPL CALC-MCNC: 153 MG/DL
LH SERPL-ACNC: 25.8 IU/L
LYMPHOCYTES # BLD AUTO: 1.59 K/UL
LYMPHOCYTES NFR BLD AUTO: 19.9 %
MAN DIFF?: NORMAL
MCHC RBC-ENTMCNC: 29.3 PG
MCHC RBC-ENTMCNC: 31.4 GM/DL
MCV RBC AUTO: 93.2 FL
MONOCYTES # BLD AUTO: 0.45 K/UL
MONOCYTES NFR BLD AUTO: 5.6 %
NEUTROPHILS # BLD AUTO: 5.52 K/UL
NEUTROPHILS NFR BLD AUTO: 69.1 %
NONHDLC SERPL-MCNC: 184 MG/DL
PLATELET # BLD AUTO: 355 K/UL
POTASSIUM SERPL-SCNC: 4.9 MMOL/L
PROGEST SERPL-MCNC: 0.1 NG/ML
PROT SERPL-MCNC: 6.9 G/DL
RBC # BLD: 4.54 M/UL
RBC # FLD: 13.7 %
SARS-COV-2 AB SERPL IA-ACNC: >250 U/ML
SODIUM SERPL-SCNC: 138 MMOL/L
TESTOST SERPL-MCNC: <2.5 NG/DL
TRIGL SERPL-MCNC: 154 MG/DL
TSH SERPL-ACNC: 1.15 UIU/ML
WBC # FLD AUTO: 7.99 K/UL

## 2021-06-29 NOTE — HEALTH RISK ASSESSMENT
[Patient reported colonoscopy was abnormal] : Patient reported colonoscopy was abnormal [With Family] : lives with family [Employed] : employed [] :  [# Of Children ___] : has [unfilled] children [Sexually Active] : sexually active [Feels Safe at Home] : Feels safe at home [Reports normal functional visual acuity (ie: able to read med bottle)] : Reports normal functional visual acuity [Smoke Detector] : smoke detector [Carbon Monoxide Detector] : carbon monoxide detector [Safety elements used in home] : safety elements used in home [Seat Belt] :  uses seat belt [Sunscreen] : uses sunscreen [College] : College [Change in mental status noted] : No change in mental status noted [High Risk Behavior] : no high risk behavior [Reports changes in hearing] : Reports no changes in hearing [Reports changes in vision] : Reports no changes in vision [Reports changes in dental health] : Reports no changes in dental health [Travel to Developing Areas] : does not  travel to developing areas [TB Exposure] : is not being exposed to tuberculosis [MammogramDate] : 2/2020 [PapSmearDate] : 2/2020 [ColonoscopyDate] : 2/2019 [ColonoscopyComments] : polyp- repeat 2022 [FreeTextEntry2] : real estate

## 2021-06-29 NOTE — HISTORY OF PRESENT ILLNESS
[FreeTextEntry1] : Routine PE [de-identified] : Patient is currently utd with ophtho and the dentist, and her last visit with derm was several years ago.  She did schedule herself with derm however.  She follows a normal diet, and she exercises rarely.  All vaccinations are also currently utd.

## 2021-06-29 NOTE — PLAN
[FreeTextEntry1] : Patient is advised to continue with her current diet and exercise, along with her current rx management. She  is also advised to make sure that she follows up with the ophthalmologist and dentist annually. She is also advised to make sure to also follow up with the dermatologist for routine skin checks.  She is also advised to make sure that she exercises when possible, and follows up for any medical issues that arise. Her labs will be checked as noted above.  All vaccines are currently utd.\par

## 2021-07-06 ENCOUNTER — NON-APPOINTMENT (OUTPATIENT)
Age: 56
End: 2021-07-06

## 2021-07-31 ENCOUNTER — EMERGENCY (EMERGENCY)
Facility: HOSPITAL | Age: 56
LOS: 0 days | Discharge: HOME | End: 2021-08-01
Attending: EMERGENCY MEDICINE
Payer: COMMERCIAL

## 2021-07-31 VITALS
RESPIRATION RATE: 18 BRPM | DIASTOLIC BLOOD PRESSURE: 51 MMHG | HEART RATE: 74 BPM | SYSTOLIC BLOOD PRESSURE: 80 MMHG | OXYGEN SATURATION: 97 % | TEMPERATURE: 98 F

## 2021-07-31 DIAGNOSIS — Z87.01 PERSONAL HISTORY OF PNEUMONIA (RECURRENT): ICD-10-CM

## 2021-07-31 DIAGNOSIS — S52.591A OTHER FRACTURES OF LOWER END OF RIGHT RADIUS, INITIAL ENCOUNTER FOR CLOSED FRACTURE: ICD-10-CM

## 2021-07-31 DIAGNOSIS — M06.9 RHEUMATOID ARTHRITIS, UNSPECIFIED: ICD-10-CM
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
## 2021-07-31 DIAGNOSIS — M25.531 PAIN IN RIGHT WRIST: ICD-10-CM

## 2021-07-31 PROCEDURE — 73090 X-RAY EXAM OF FOREARM: CPT | Mod: RT

## 2021-07-31 PROCEDURE — 76376 3D RENDER W/INTRP POSTPROCES: CPT | Mod: 26

## 2021-07-31 PROCEDURE — 99285 EMERGENCY DEPT VISIT HI MDM: CPT | Mod: 25

## 2021-07-31 PROCEDURE — 73200 CT UPPER EXTREMITY W/O DYE: CPT | Mod: 26,RT,MA

## 2021-07-31 PROCEDURE — 73200 CT UPPER EXTREMITY W/O DYE: CPT | Mod: RT

## 2021-07-31 PROCEDURE — 76376 3D RENDER W/INTRP POSTPROCES: CPT

## 2021-07-31 PROCEDURE — 99285 EMERGENCY DEPT VISIT HI MDM: CPT

## 2021-07-31 PROCEDURE — 73110 X-RAY EXAM OF WRIST: CPT | Mod: RT

## 2021-07-31 PROCEDURE — 73110 X-RAY EXAM OF WRIST: CPT | Mod: 26,RT

## 2021-07-31 PROCEDURE — 73090 X-RAY EXAM OF FOREARM: CPT | Mod: 26,RT

## 2021-07-31 PROCEDURE — 25605 CLTX DST RDL FX/EPHYS SEP W/: CPT | Mod: RT

## 2021-07-31 NOTE — ED PROVIDER NOTE - OBJECTIVE STATEMENT
55 y/o F with a PMHx of Rheumatoid arthritis on Fumera presents to the ED s/p witnessed trip and fall on driveway after getting out of the car. Denies head injury and LOC. R hand dominant. Pt states she drank ETOH today after being on boat today; currently is is intoxicated. c/o R wrist pain; has hx of wrist surgery performed by Dr. Magaly Tomas

## 2021-07-31 NOTE — ED ADULT NURSE NOTE - NSIMPLEMENTINTERV_GEN_ALL_ED
Implemented All Fall with Harm Risk Interventions:  [place] to call system. Call bell, personal items and telephone within reach. Instruct patient to call for assistance. Room bathroom lighting operational. Non-slip footwear when patient is off stretcher. Physically safe environment: no spills, clutter or unnecessary equipment. Stretcher in lowest position, wheels locked, appropriate side rails in place. Provide visual cue, wrist band, yellow gown, etc. Monitor gait and stability. Monitor for mental status changes and reorient to person, place, and time. Review medications for side effects contributing to fall risk. Reinforce activity limits and safety measures with patient and family. Provide visual clues: red socks.

## 2021-07-31 NOTE — ED PROVIDER NOTE - CARE PROVIDER_API CALL
Magaly Tomas)  Orthopaedic Surgery; Surgery of the Hand  [address] [place] [address]  Phone: [phone]  Fax: [phone]  Follow Up Time:

## 2021-07-31 NOTE — ED PROVIDER NOTE - PATIENT PORTAL LINK FT
You can access the FollowMyHealth Patient Portal offered by [facility] by registering at the following website: [email or web address]. By joining [facility]’s FollowMyHealth portal, you will also be able to view your health information using other applications (apps) compatible with our system.

## 2021-07-31 NOTE — ED PROVIDER NOTE - CLINICAL SUMMARY MEDICAL DECISION MAKING FREE TEXT BOX
55 y/o F presents to the ED s/p witnessed trip and fall. Pt c/o  R wrist pain s/p ORIF to the R wrist. Will do X-rays to rule out fracture

## 2021-07-31 NOTE — ED PROVIDER NOTE - PROGRESS NOTE DETAILS
[facility] for Dr. Sherman: x ray with possible fracture will obtain CT and ortho evaluation. WILLIAMG:  Received signout from Dr. Sherman to follow up CT of wrist which shows perihardware fracture; likely acute.  Ortho paged for consult. Ortho at patients bedside splinting patient.  Patient to follow up in Dr. Hui office.

## 2021-07-31 NOTE — ED STATDOCS - PROGRESS NOTE DETAILS
[facility] for Dr. Kemp  56 y/of  with Rheumatoid arthritis on Fumera who is s/p trip and fall in driveway. Pt is c/o right wrist pain and decreased ROM, has a Hx of wrist surgery and now with deformity to right wrist. Denies LOC, or neck pain/back pain. Also denies chest pain or SOB. Will send pt to main ED for further evaluation as pt admits to EOTH use and intoxication and low blood pressure on arrival. [facility] for Dr. Kemp  56 y/of  with Rheumatoid arthritis on Fumera who is s/p trip and fall in driveway. Pt is c/o right wrist pain and decreased ROM, has a Hx of wrist surgery and now with deformity to right wrist. Denies LOC, or neck pain/back pain. Also denies chest pain or SOB. Will send pt to main ED for further evaluation as pt admits to EOTH use and intoxication and low blood pressure on arrival. Triage systolic was in 80s, but repeat blood pressure is 117/66.

## 2021-08-01 VITALS
TEMPERATURE: 98 F | HEART RATE: 72 BPM | SYSTOLIC BLOOD PRESSURE: 110 MMHG | RESPIRATION RATE: 18 BRPM | OXYGEN SATURATION: 100 % | DIASTOLIC BLOOD PRESSURE: 65 MMHG

## 2021-08-01 PROCEDURE — 73110 X-RAY EXAM OF WRIST: CPT | Mod: 26,RT

## 2021-08-01 PROCEDURE — 73090 X-RAY EXAM OF FOREARM: CPT | Mod: 26,RT

## 2021-08-01 NOTE — CONSULT NOTE ADULT - SUBJECTIVE AND OBJECTIVE BOX
Patient is a 56yFemale RHD who presents to  ED w/ a c/o of R wrist pain after MF in her driveway today. Patient states she tripped, denies preceding CP/SOB/headache/confusion/dizziness/palitations/weakness/fatigue. Denies Head trauma/LOC. States ability to walk immediately following the injury. Denies any numbness or tingling. Denies having any other pain elsewhere. History of R DR ANDERSEN with Dr Tomas in '17. No other orthopedic concerns at this time.    No pertinent past medical history    No pertinent past medical history    Rheumatoid arthritis    PNA (pneumonia)            No Known Allergies      PHYSICAL EXAM:  T(C): 36.8 (08-01-21 @ 00:12), Max: 36.9 (07-31-21 @ 21:17)  HR: 71 (08-01-21 @ 00:12) (71 - 74)  BP: 112/61 (08-01-21 @ 00:12) (80/51 - 117/66)  RR: 19 (08-01-21 @ 00:12) (18 - 19)  SpO2: 100% (08-01-21 @ 00:12) (97% - 100%)    Gen: NAD, Resting comfortably    RIGHT Upper Extremity:   Skin intact, no erythema, ecchymosis, edema, or gross deformity  TTP over the bony prominences of the distal radius  Painless passive/active ROM of the shoulder/elbow/hand/fingers  C5-T1 SILT  Motor grossly intact throughout axillary/musculocutaneous/radial/median/ulnar/AIN/PIN nerves  + radial pulse  Compartments soft and compressible    Secondary Survey:   No TTP over bony prominences, SILT, palpable pulses, full/painless A/PROM, compartments soft. No TTP over spinous processes or paraspinal muscles at C/T/L spine. No palpable step off. No other injuries or complaints.      Procedure:   A well padded Orthoglass splint was applied. The patient tolerated the procedure well and there we no complications. The patient's post-reduction neurovascular exam was unchanged. Post-reduction xrays demonstrated acceptable alignment.

## 2021-08-01 NOTE — CONSULT NOTE ADULT - ASSESSMENT
A/P: 56F w/ R Cherrie implant distal radius Fx s/p ORIF in 2017    Case to be discussed and reviewed with Dr Tomas  Images show acute fracture around previous hardware  Pt  placed in sugar tong splint, post reduction images were taken.  No immediate need for ortho surgical intervention, but pt made aware of possible need for surgical intervention in the future.   Analgesia prn  NWB RUE in sugar tong splint and sling  DVT ppx if decreased ambulation  PT/OT as tolerated, further instructions in office  Ice and elevate as tolerated  Orthopedically stable for discharge w/ plan to FU outpatient w/ Dr Tomas , call office for apt.

## 2021-08-02 ENCOUNTER — INPATIENT (INPATIENT)
Facility: HOSPITAL | Age: 56
LOS: 0 days | Discharge: HOME | DRG: 497 | End: 2021-08-02
Attending: ORTHOPAEDIC SURGERY | Admitting: ORTHOPAEDIC SURGERY
Payer: COMMERCIAL

## 2021-08-02 VITALS
RESPIRATION RATE: 18 BRPM | HEART RATE: 73 BPM | TEMPERATURE: 98 F | SYSTOLIC BLOOD PRESSURE: 142 MMHG | OXYGEN SATURATION: 98 % | DIASTOLIC BLOOD PRESSURE: 86 MMHG

## 2021-08-02 VITALS
TEMPERATURE: 98 F | HEART RATE: 84 BPM | SYSTOLIC BLOOD PRESSURE: 121 MMHG | DIASTOLIC BLOOD PRESSURE: 76 MMHG | RESPIRATION RATE: 18 BRPM | OXYGEN SATURATION: 95 %

## 2021-08-02 DIAGNOSIS — S62.101A FRACTURE OF UNSPECIFIED CARPAL BONE, RIGHT WRIST, INITIAL ENCOUNTER FOR CLOSED FRACTURE: ICD-10-CM

## 2021-08-02 LAB
ALBUMIN SERPL ELPH-MCNC: 3.8 G/DL — SIGNIFICANT CHANGE UP (ref 3.3–5)
ALP SERPL-CCNC: 97 U/L — SIGNIFICANT CHANGE UP (ref 40–120)
ALT FLD-CCNC: 28 U/L — SIGNIFICANT CHANGE UP (ref 12–78)
ANION GAP SERPL CALC-SCNC: 5 MMOL/L — SIGNIFICANT CHANGE UP (ref 5–17)
APPEARANCE UR: ABNORMAL
APTT BLD: 35 SEC — SIGNIFICANT CHANGE UP (ref 27.5–35.5)
AST SERPL-CCNC: 15 U/L — SIGNIFICANT CHANGE UP (ref 15–37)
BASOPHILS # BLD AUTO: 0.07 K/UL — SIGNIFICANT CHANGE UP (ref 0–0.2)
BASOPHILS NFR BLD AUTO: 0.9 % — SIGNIFICANT CHANGE UP (ref 0–2)
BILIRUB SERPL-MCNC: 0.4 MG/DL — SIGNIFICANT CHANGE UP (ref 0.2–1.2)
BILIRUB UR-MCNC: NEGATIVE — SIGNIFICANT CHANGE UP
BUN SERPL-MCNC: 16 MG/DL — SIGNIFICANT CHANGE UP (ref 7–23)
CALCIUM SERPL-MCNC: 9.5 MG/DL — SIGNIFICANT CHANGE UP (ref 8.5–10.1)
CHLORIDE SERPL-SCNC: 106 MMOL/L — SIGNIFICANT CHANGE UP (ref 96–108)
CO2 SERPL-SCNC: 26 MMOL/L — SIGNIFICANT CHANGE UP (ref 22–31)
COLOR SPEC: YELLOW — SIGNIFICANT CHANGE UP
CREAT SERPL-MCNC: 0.76 MG/DL — SIGNIFICANT CHANGE UP (ref 0.5–1.3)
DIFF PNL FLD: ABNORMAL
EOSINOPHIL # BLD AUTO: 0.28 K/UL — SIGNIFICANT CHANGE UP (ref 0–0.5)
EOSINOPHIL NFR BLD AUTO: 3.7 % — SIGNIFICANT CHANGE UP (ref 0–6)
GLUCOSE SERPL-MCNC: 97 MG/DL — SIGNIFICANT CHANGE UP (ref 70–99)
GLUCOSE UR QL: NEGATIVE MG/DL — SIGNIFICANT CHANGE UP
HCT VFR BLD CALC: 41.3 % — SIGNIFICANT CHANGE UP (ref 34.5–45)
HGB BLD-MCNC: 13.3 G/DL — SIGNIFICANT CHANGE UP (ref 11.5–15.5)
IMM GRANULOCYTES NFR BLD AUTO: 0.3 % — SIGNIFICANT CHANGE UP (ref 0–1.5)
INR BLD: 1.04 RATIO — SIGNIFICANT CHANGE UP (ref 0.88–1.16)
KETONES UR-MCNC: NEGATIVE — SIGNIFICANT CHANGE UP
LEUKOCYTE ESTERASE UR-ACNC: ABNORMAL
LYMPHOCYTES # BLD AUTO: 1.4 K/UL — SIGNIFICANT CHANGE UP (ref 1–3.3)
LYMPHOCYTES # BLD AUTO: 18.7 % — SIGNIFICANT CHANGE UP (ref 13–44)
MCHC RBC-ENTMCNC: 29.2 PG — SIGNIFICANT CHANGE UP (ref 27–34)
MCHC RBC-ENTMCNC: 32.2 GM/DL — SIGNIFICANT CHANGE UP (ref 32–36)
MCV RBC AUTO: 90.8 FL — SIGNIFICANT CHANGE UP (ref 80–100)
MONOCYTES # BLD AUTO: 0.45 K/UL — SIGNIFICANT CHANGE UP (ref 0–0.9)
MONOCYTES NFR BLD AUTO: 6 % — SIGNIFICANT CHANGE UP (ref 2–14)
NEUTROPHILS # BLD AUTO: 5.28 K/UL — SIGNIFICANT CHANGE UP (ref 1.8–7.4)
NEUTROPHILS NFR BLD AUTO: 70.4 % — SIGNIFICANT CHANGE UP (ref 43–77)
NITRITE UR-MCNC: NEGATIVE — SIGNIFICANT CHANGE UP
PH UR: 6 — SIGNIFICANT CHANGE UP (ref 5–8)
PLATELET # BLD AUTO: 333 K/UL — SIGNIFICANT CHANGE UP (ref 150–400)
POTASSIUM SERPL-MCNC: 4.5 MMOL/L — SIGNIFICANT CHANGE UP (ref 3.5–5.3)
POTASSIUM SERPL-SCNC: 4.5 MMOL/L — SIGNIFICANT CHANGE UP (ref 3.5–5.3)
PROT SERPL-MCNC: 7.7 GM/DL — SIGNIFICANT CHANGE UP (ref 6–8.3)
PROT UR-MCNC: 15 MG/DL
PROTHROM AB SERPL-ACNC: 12.1 SEC — SIGNIFICANT CHANGE UP (ref 10.6–13.6)
RBC # BLD: 4.55 M/UL — SIGNIFICANT CHANGE UP (ref 3.8–5.2)
RBC # FLD: 13.2 % — SIGNIFICANT CHANGE UP (ref 10.3–14.5)
SARS-COV-2 RNA SPEC QL NAA+PROBE: SIGNIFICANT CHANGE UP
SODIUM SERPL-SCNC: 137 MMOL/L — SIGNIFICANT CHANGE UP (ref 135–145)
SP GR SPEC: 1.02 — SIGNIFICANT CHANGE UP (ref 1.01–1.02)
UROBILINOGEN FLD QL: NEGATIVE MG/DL — SIGNIFICANT CHANGE UP
WBC # BLD: 7.5 K/UL — SIGNIFICANT CHANGE UP (ref 3.8–10.5)
WBC # FLD AUTO: 7.5 K/UL — SIGNIFICANT CHANGE UP (ref 3.8–10.5)

## 2021-08-02 PROCEDURE — 87635 SARS-COV-2 COVID-19 AMP PRB: CPT

## 2021-08-02 PROCEDURE — 99285 EMERGENCY DEPT VISIT HI MDM: CPT

## 2021-08-02 PROCEDURE — 85610 PROTHROMBIN TIME: CPT

## 2021-08-02 PROCEDURE — 73110 X-RAY EXAM OF WRIST: CPT | Mod: RT

## 2021-08-02 PROCEDURE — 85730 THROMBOPLASTIN TIME PARTIAL: CPT

## 2021-08-02 PROCEDURE — 81001 URINALYSIS AUTO W/SCOPE: CPT

## 2021-08-02 PROCEDURE — 71045 X-RAY EXAM CHEST 1 VIEW: CPT

## 2021-08-02 PROCEDURE — 85025 COMPLETE CBC W/AUTO DIFF WBC: CPT

## 2021-08-02 PROCEDURE — 73110 X-RAY EXAM OF WRIST: CPT | Mod: 26,RT

## 2021-08-02 PROCEDURE — 36415 COLL VENOUS BLD VENIPUNCTURE: CPT

## 2021-08-02 PROCEDURE — 86901 BLOOD TYPING SEROLOGIC RH(D): CPT

## 2021-08-02 PROCEDURE — 71045 X-RAY EXAM CHEST 1 VIEW: CPT | Mod: 26

## 2021-08-02 PROCEDURE — 87075 CULTR BACTERIA EXCEPT BLOOD: CPT

## 2021-08-02 PROCEDURE — 80053 COMPREHEN METABOLIC PANEL: CPT

## 2021-08-02 PROCEDURE — 87070 CULTURE OTHR SPECIMN AEROBIC: CPT

## 2021-08-02 PROCEDURE — 86900 BLOOD TYPING SEROLOGIC ABO: CPT

## 2021-08-02 PROCEDURE — 86850 RBC ANTIBODY SCREEN: CPT

## 2021-08-02 PROCEDURE — 87102 FUNGUS ISOLATION CULTURE: CPT

## 2021-08-02 PROCEDURE — 93010 ELECTROCARDIOGRAM REPORT: CPT

## 2021-08-02 PROCEDURE — 87116 MYCOBACTERIA CULTURE: CPT

## 2021-08-02 PROCEDURE — C1713: CPT

## 2021-08-02 PROCEDURE — 99222 1ST HOSP IP/OBS MODERATE 55: CPT

## 2021-08-02 RX ORDER — OXYCODONE HYDROCHLORIDE 5 MG/1
5 TABLET ORAL ONCE
Refills: 0 | Status: DISCONTINUED | OUTPATIENT
Start: 2021-08-02 | End: 2021-08-02

## 2021-08-02 RX ORDER — ACETAMINOPHEN 500 MG
975 TABLET ORAL EVERY 6 HOURS
Refills: 0 | Status: DISCONTINUED | OUTPATIENT
Start: 2021-08-02 | End: 2021-08-02

## 2021-08-02 RX ORDER — ONDANSETRON 8 MG/1
4 TABLET, FILM COATED ORAL ONCE
Refills: 0 | Status: DISCONTINUED | OUTPATIENT
Start: 2021-08-02 | End: 2021-08-02

## 2021-08-02 RX ORDER — ACETAMINOPHEN 500 MG
1000 TABLET ORAL ONCE
Refills: 0 | Status: DISCONTINUED | OUTPATIENT
Start: 2021-08-02 | End: 2021-08-02

## 2021-08-02 RX ORDER — SODIUM CHLORIDE 9 MG/ML
1000 INJECTION, SOLUTION INTRAVENOUS
Refills: 0 | Status: DISCONTINUED | OUTPATIENT
Start: 2021-08-02 | End: 2021-08-02

## 2021-08-02 RX ORDER — CEPHALEXIN 500 MG
1 CAPSULE ORAL
Qty: 12 | Refills: 0
Start: 2021-08-02 | End: 2021-08-04

## 2021-08-02 RX ORDER — HYDROMORPHONE HYDROCHLORIDE 2 MG/ML
0.5 INJECTION INTRAMUSCULAR; INTRAVENOUS; SUBCUTANEOUS
Refills: 0 | Status: DISCONTINUED | OUTPATIENT
Start: 2021-08-02 | End: 2021-08-02

## 2021-08-02 RX ORDER — FENTANYL CITRATE 50 UG/ML
50 INJECTION INTRAVENOUS
Refills: 0 | Status: DISCONTINUED | OUTPATIENT
Start: 2021-08-02 | End: 2021-08-02

## 2021-08-02 RX ORDER — ONDANSETRON 8 MG/1
4 TABLET, FILM COATED ORAL EVERY 6 HOURS
Refills: 0 | Status: DISCONTINUED | OUTPATIENT
Start: 2021-08-02 | End: 2021-08-02

## 2021-08-02 RX ORDER — ALBUTEROL 90 UG/1
1 AEROSOL, METERED ORAL EVERY 8 HOURS
Refills: 0 | Status: DISCONTINUED | OUTPATIENT
Start: 2021-08-02 | End: 2021-08-02

## 2021-08-02 NOTE — H&P ADULT - ASSESSMENT
RIght wrist alexsandra-implant fracture, closed.  -Admit for OR today  -NPO  -Preop  -Consent on chart  -MEdical eval appreciated preop  -OOB ambulate  -NWB RUE in splin/sling  -Elevate right hand  -DVT PE ppx  -ABove as discussed with and directed by DR Tomas  RIght wrist distal radius re-fracture with fracture of implant as well.  -Pt for OR today  -NPO  -Preop  -Consent on chart  -MEdical eval appreciated preop  -OOB ambulate  -NWB RUE in splin/sling  -Elevate right hand  -DVT PE ppx  -ABove as discussed with and directed by DR Tomas

## 2021-08-02 NOTE — BRIEF OPERATIVE NOTE - OPERATION/FINDINGS
Right revision distal radius ORIF for periprosthetic fracture Right revision distal radius ORIF for periprosthetic fracture with removal of existing failed hardware and replating with longer plate

## 2021-08-02 NOTE — H&P ADULT - NSHPPHYSICALEXAM_GEN_ALL_CORE
Exam:  NAD AAOx3 calm pleasant lady  Head: NC AT, PEERL  Neck: FAROM supple  Pulse: Regular  Lungs: Breathing nonlabored  Abdomen: Soft NT  LE: No edema  Musculoskeletal:  RUE in intact splint. AIN PIN intact. Able to wiggle and extend/flex all digits. Good sensation distally and warm to touch digits 1-5 Exam:  NAD AAOx3 calm pleasant lady  Head: NC AT, PEERL  Neck: FAROM supple  Pulse: Regular  Lungs: Breathing nonlabored  Abdomen: Soft NT  LE: No edema  Musculoskeletal:  RUE in pain with tenderness/deformity swelling at right distal radius. Prior volar wrist scar seen. AIN PIN intact. Able to wiggle and extend/flex all digits. Good sensation distally and warm to touch digits 1-5

## 2021-08-02 NOTE — BRIEF OPERATIVE NOTE - NSICDXBRIEFPOSTOP_GEN_ALL_CORE_FT
POST-OP DIAGNOSIS:  Distal radius fracture, right 02-Aug-2021 18:19:42 periprosthetic Clarence Wayne

## 2021-08-02 NOTE — ED STATDOCS - CLINICAL SUMMARY MEDICAL DECISION MAKING FREE TEXT BOX
pt with wrist fx with hardware fracture, sent by Dr. Tomas, probably need to go to OR for fx and hardware replacement.

## 2021-08-02 NOTE — ED ADULT NURSE NOTE - OBJECTIVE STATEMENT
ED c/o throbbing/aching pain to R wrist. Pt was seen in the ED on 07/31 s/p fall and seen with a wrist fracture and had splint placed. Pt has been having pain since so called Dr. Tomas and was sent to the ED for admission and surgery. Hx of prior R wrist fx with hardware placed.

## 2021-08-02 NOTE — H&P ADULT - NSHPLABSRESULTS_GEN_ALL_CORE
13.3   7.50  )-----------( 333      ( 02 Aug 2021 13:00 )             41.3   08-02    137  |  106  |  16  ----------------------------<  97  4.5   |  26  |  0.76    Ca    9.5      02 Aug 2021 13:00    TPro  7.7  /  Alb  3.8  /  TBili  0.4  /  DBili  x   /  AST  15  /  ALT  28  /  AlkPhos  97  08-02

## 2021-08-02 NOTE — CONSULT NOTE ADULT - ASSESSMENT
56 y.o. female with PMHx of RA, HLD p/w mechanical fall and right sided periprosthetic cx 56 y.o. female with PMHx of RA, HLD p/w mechanical fall and right sided periprosthetic cx   - pt is low cardiac risk for RUE periprosthetic fracture repair - medically optimized (no EKG found for review, but pt w/o any cardiac symptoms)   - RA - resume home meds after DC   - pain control optimization as per primary team    Thank you for courtesy of consult

## 2021-08-02 NOTE — ASU DISCHARGE PLAN (ADULT/PEDIATRIC) - ASU DC SPECIAL INSTRUCTIONSFT
1. Keep bandage clean and dry.     2. If you have a splint on, keep it on until you see Dr. Tomas in the office. Do not get the splint wet because it will break down and is not good for your skin.    3. Elevate hand as much as possible and wiggle fingers as much as you can, as often as you think of it (if you are watching TV every commercial wiggle 10 times, or reading a book wiggle 10 times after every 5 pages read). The exception is if you have a splint you will not be able to wiggle the affected digit. Try to wiggle the free ones though.    4. Walk plenty, no sitting around.    5. Pain medication was sent to the pharmacy, take as prescribed. You may also take ibuprofen and acetaminophen over the counter for pain, follow instruction on the bottle. Take antibiotics (Keflex) for 3 days after surgery.      6. See Dr. Tomas in the office in about 7-10 days. Call to schedule. You will have you wound checked then, any sutures will be removed, and you splint will be changed if you have one on.

## 2021-08-02 NOTE — ASU DISCHARGE PLAN (ADULT/PEDIATRIC) - CARE PROVIDER_API CALL
Magaly Tomas)  Orthopaedic Surgery; Surgery of the Hand  [address] [place] [address]  Phone: [phone]  Fax: [phone]  Follow Up Time:

## 2021-08-02 NOTE — ASU DISCHARGE PLAN (ADULT/PEDIATRIC) - CALL YOUR DOCTOR IF YOU HAVE ANY OF THE FOLLOWING:
Swelling that gets worse/Pain not relieved by Medications/Numbness, tingling, color or temperature change to extremity

## 2021-08-02 NOTE — ED STATDOCS - OBJECTIVE STATEMENT
57 y/o female with a PMHx of RA, presents to the ED c/o throbbing/aching pain to R wrist. Pt was seen in the ED on 07/31 s/p fall and seen with a wrist fracture and had splint placed. Pt has been having pain since so called Dr. Tomas and was sent to the ED for admission and surgery. Hx of prior R wrist fx with hardware placed.  PMD: Dr. Mirlande Flores

## 2021-08-02 NOTE — ED STATDOCS - PROGRESS NOTE DETAILS
56 yr. old female presents to ED with pain in right wrist after fall on Saturday. Seen ion ED and sent to ED by Dr. Tomas for admission. 56 yr. old female presents to ED with pain in right wrist after fall on Saturday. Seen ion ED and sent to ED by Dr. Tomas for admission and surgery.  Reports she broke a plate in her wrist from prior surgery on fracture. Seen and examined by attending in intake. Plan: IV,Labs, Chest X-ray ,EKG and X-ray of right wrist NPO and re evaluate. Greer NP Seen and examined by [facility] in consult with Dr. Tomas. Greer MENDOZA

## 2021-08-02 NOTE — BRIEF OPERATIVE NOTE - NSICDXBRIEFPROCEDURE_GEN_ALL_CORE_FT
PROCEDURES:  Open reduction and internal fixation of 3 or more fragments of distal radius 02-Aug-2021 18:18:41 Revision right distal radius Clarence Wayne

## 2021-08-02 NOTE — CONSULT NOTE ADULT - SUBJECTIVE AND OBJECTIVE BOX
56 y.o. female with PMHx of RA, HLD p/w mechanical fall and right sided periprosthetic cx - planned for OR in next 15 min and preop evaluation requested.  Pt c/o mild to moderate pain at RUE  Other ROS reviewed and neg     PMHx: RA on humera for 3 years, HLD on fish oil  PSHx: Right forearm surgery  All: NKDA  SHx: vapes tobacco, no ETOH socially, no IVDA  FHx: CAD both parents, Hepatitis C mother  Meds: see MR    Vital Signs Last 24 Hrs  T(C): 36.8 (02 Aug 2021 12:01), Max: 36.8 (02 Aug 2021 12:01)  T(F): 98.3 (02 Aug 2021 12:01), Max: 98.3 (02 Aug 2021 12:01)  HR: 84 (02 Aug 2021 12:) (84 - 84)  BP: 121/76 (02 Aug 2021 12:01) (121/76 - 121/76)  BP(mean): 87 (02 Aug 2021 12:01) (87 - 87)  RR: 18 (02 Aug 2021 12:01) (18 - 18)  SpO2: 95% (02 Aug 2021 12:01) (95% - 95%)                              13.3   7.50  )-----------( 333      ( 02 Aug 2021 13:00 )             41.3     02 Aug 2021 13:00    137    |  106    |  16     ----------------------------<  97     4.5     |  26     |  0.76     Ca    9.5        02 Aug 2021 13:00    TPro  7.7    /  Alb  3.8    /  TBili  0.4    /  DBili  x      /  AST  15     /  ALT  28     /  AlkPhos  97     02 Aug 2021 13:00    PT/INR - ( 02 Aug 2021 13:41 )   PT: 12.1 sec;   INR: 1.04 ratio         PTT - ( 02 Aug 2021 13:41 )  PTT:35.0 sec  CAPILLARY BLOOD GLUCOSE        LIVER FUNCTIONS - ( 02 Aug 2021 13:00 )  Alb: 3.8 g/dL / Pro: 7.7 gm/dL / ALK PHOS: 97 U/L / ALT: 28 U/L / AST: 15 U/L / GGT: x           Urinalysis Basic - ( 02 Aug 2021 13:00 )    Color: Yellow / Appearance: Slightly Turbid / S.020 / pH: x  Gluc: x / Ketone: Negative  / Bili: Negative / Urobili: Negative mg/dL   Blood: x / Protein: 15 mg/dL / Nitrite: Negative   Leuk Esterase: Moderate / RBC: 0-2 /HPF / WBC >50   Sq Epi: x / Non Sq Epi: Few / Bacteria: Occasional    MEDICATIONS  (STANDING):  lactated ringers. 1000 milliLiter(s) (75 mL/Hr) IV Continuous <Continuous>  MEDICATIONS  (PRN):  acetaminophen   Tablet .. 975 milliGRAM(s) Oral every 6 hours PRN Temp greater or equal to 38C (100.4F), Mild Pain (1 - 3)  ALBUTerol    90 MICROgram(s) HFA Inhaler 1 Puff(s) Inhalation every 8 hours PRN Shortness of Breath and/or Wheezing  ondansetron Injectable 4 milliGRAM(s) IV Push every 6 hours PRN Nausea and/or Vomiting    PHYSICAL EXAM:    General: female in no acute distress  Eyes: PERRLA, EOMI; conjunctiva and sclera clear  Head: Normocephalic; atraumatic  ENMT: No nasal discharge; airway clear  Neck: Supple; non tender; no masses  Respiratory: No wheezes, rales or rhonchi  Cardiovascular: Regular rate and rhythm. S1 and S2   Gastrointestinal: Soft non-tender non-distended; Normal bowel sounds  Genitourinary: No costovertebral angle tenderness  Extremities: RUE in the soft cast, No clubbing, cyanosis or edema  Vascular: Peripheral pulses palpable 2+ bilaterally  Neurological: Alert and oriented x4  Skin: Warm and dry.   Musculoskeletal: Normal gait, tone  Psychiatric: Cooperative and appropriate

## 2021-08-02 NOTE — H&P ADULT - HISTORY OF PRESENT ILLNESS
56F pmh RA and right wrist fx treated w ORIF by Dr Tomas several years ago. Pt fell in driveway over weekend Saturday and re-fractured the wrist around the plate while she was stepping out of a high SUV in her steep driveway. She had immediate pain with no alleviating factors and was treated in the ED 2 days ago with a splint. SHe continues to have pain and is sent in to the ED by Dr Tomas in preparation for admission and surgery to take place today. Last PO 9am 1 shot of expresso and water. No other injuries. Denies paresthesias, SHe is Right hand dominant. seen in ED w ortho team. 56F pmh RA and right wrist fx treated w ORIF by Dr Tomas several years ago. Pt fell in driveway and re-fractured the wrist around the plate. She had immediate pain and trouble moving the wrist.  Last PO 9am 1 shot of expresso and water. No other injuries. Denies paresthesias, She is Right hand dominant. Seen in ED w ortho team.

## 2021-08-02 NOTE — H&P ADULT - ATTENDING COMMENTS
Patient has refracture of right distal radius after a new fall. Was previously fixed in 2017. Fracture is displaced and plate is broken at most distal screw. Attempt at revision fixation with hardware removal and longer plating reviewed. Full consent signed. No guarantees regarding outcomes made or implied.

## 2021-08-02 NOTE — BRIEF OPERATIVE NOTE - NSICDXBRIEFPREOP_GEN_ALL_CORE_FT
PRE-OP DIAGNOSIS:  Distal radius fracture, right 02-Aug-2021 18:19:31 periprosthetic Clarence Wayne

## 2021-08-02 NOTE — ED ADULT TRIAGE NOTE - CHIEF COMPLAINT QUOTE
Pt fell in driveway with injury to right wrist. Pt was seen in  and RUE was splinted. Pt reports continued pain.  Pt called Dr. Tomas and was told to come to ED.
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
## 2021-08-09 DIAGNOSIS — S52.501A UNSPECIFIED FRACTURE OF THE LOWER END OF RIGHT RADIUS, INITIAL ENCOUNTER FOR CLOSED FRACTURE: ICD-10-CM
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
## 2021-08-09 DIAGNOSIS — F17.290 NICOTINE DEPENDENCE, OTHER TOBACCO PRODUCT, UNCOMPLICATED: ICD-10-CM

## 2021-08-09 DIAGNOSIS — M06.9 RHEUMATOID ARTHRITIS, UNSPECIFIED: ICD-10-CM

## 2021-09-01 ENCOUNTER — APPOINTMENT (OUTPATIENT)
Dept: ORTHOPEDIC SURGERY | Facility: CLINIC | Age: 56
End: 2021-09-01
Payer: COMMERCIAL

## 2021-09-01 VITALS
SYSTOLIC BLOOD PRESSURE: 109 MMHG | BODY MASS INDEX: 40.18 KG/M2 | HEIGHT: 66 IN | WEIGHT: 250 LBS | HEART RATE: 84 BPM | DIASTOLIC BLOOD PRESSURE: 81 MMHG

## 2021-09-01 PROCEDURE — 20610 DRAIN/INJ JOINT/BURSA W/O US: CPT | Mod: 50

## 2021-09-01 PROCEDURE — 73522 X-RAY EXAM HIPS BI 3-4 VIEWS: CPT

## 2021-09-01 PROCEDURE — 99214 OFFICE O/P EST MOD 30 MIN: CPT | Mod: 25

## 2021-09-02 NOTE — ED ADULT TRIAGE NOTE - WEIGHT IN LBS
Thank you for allowing us to care for you at [facility] today. Thank you for your patience.     You have been seen and evaluated. We reviewed the results from your visit in the emergency department. Please read the instructions provided, and if given prescriptions, take as instructed.     Remember, you care process does not end after your visit today. Please follow-up with your doctor within 1-2 days for a follow-up check to ensure you are  improving, to see if you need any further evaluation/testing, or to evaluate for any alternate diagnoses.     If you do not have a primary care provider, call [phone] and they will be able to assist you further.     Please return to the emergency department if you develop shortness of breath, chest pain, dizziness, headache, diarrhea or severe vomiting, wheezing, or if you develop any other new or concerning symptoms as these could be signs of more serious medical illness.    We hope you feel better.    Follow-up with your PCP for elevated blood pressure reading during this ED visit today.    
250

## 2021-09-07 ENCOUNTER — APPOINTMENT (OUTPATIENT)
Dept: DISASTER EMERGENCY | Facility: CLINIC | Age: 56
End: 2021-09-07

## 2021-09-07 DIAGNOSIS — Z01.818 ENCOUNTER FOR OTHER PREPROCEDURAL EXAMINATION: ICD-10-CM

## 2021-09-08 LAB — SARS-COV-2 N GENE NPH QL NAA+PROBE: NOT DETECTED

## 2021-09-08 NOTE — ADDENDUM
[FreeTextEntry1] : This note was written by Alley Rodriguez on 09/08/2021 acting as scribe for Tk Vázquez III, MD

## 2021-09-08 NOTE — DISCUSSION/SUMMARY
[de-identified] : At this time, I have recommended ice and elevation for the bilateral hip trochanteric bursitis.  She will be reassessed in three to four weeks.

## 2021-09-08 NOTE — PROCEDURE
[de-identified] : Consent: \par At this time, I have recommended an injection to the right and left hip.  The risks and benefits of the procedure were discussed with the patient in detail.  Upon verbal consent of the patient, we proceeded with the injections as noted below.  \par \par Procedure #1:  \par After a sterile prep, the patient underwent an injection of 9 cc of 1% Lidocaine without epinephrine and 1 cc of Kenalog into the right hip.  The patient tolerated the procedure well.  There were no complications. \par \par Procedure #2:  \par After a sterile prep, the patient underwent an injection of 9 cc of 1% Lidocaine without epinephrine and 1 cc of Kenalog into the left hip.  The patient tolerated the procedure well.  There were no complications.

## 2021-09-08 NOTE — PHYSICAL EXAM
[de-identified] : Right Hip: \par Range of Motion in Degrees:\par 	                                 Claimant:	          Normal:	\par Flexion (Active) 	                 120 	          120-degrees	\par Flexion (Passive)	                 120	          120-degrees	\par Extension (Active)	                 -30	          -30-degrees	\par Extension (Passive)	 -30	          -30-degrees	\par Abduction (Active)	                45-50	          08-00-pdgpkuq	\par Abduction (Passive)	45-50	          02-20-jxmegbj	\par Adduction (Active)                	20-30	          73-17-euvnrxw	\par Adduction (Passive)	20-30	          81-80-hzjvfkf	\par Internal Rotation (Active) 	35	          35-degrees	\par Internal Rotation (Passive)	35	          35-degrees	\par External Rotation (Active)	45	          45-degrees	\par External Rotation (Passive)	45	          45-degrees	\par \par No tenderness with internal or external rotation or axial load.  Point tenderness over the greater trochanter with pain with resisted abduction.  Negative Trendelenburg.  No weakness to flexion, extension, abduction or adduction.  No evidence of instability.  No motor or sensory deficits.  2+ DP and PT pulses.  Skin is intact.  No scars, rashes or lesions.  \par  \par Left Hip: \par Range of Motion in Degrees:\par 	                                 Claimant:	          Normal:	\par Flexion (Active) 	                 120 	          120-degrees	\par Flexion (Passive)	                 120	          120-degrees	\par Extension (Active)	                 -30	          -30-degrees	\par Extension (Passive)	 -30	          -30-degrees	\par Abduction (Active)	                45-50	          15-13-ckkdakz	\par Abduction (Passive)	45-50	          57-46-tbpcmzn	\par Adduction (Active)                	20-30	          94-28-pkaiefj	\par Adduction (Passive)	20-30	          13-76-injhfbg	\par Internal Rotation (Active) 	35	          35-degrees	\par Internal Rotation (Passive)	35	          35-degrees	\par External Rotation (Active)	45	          45-degrees	\par External Rotation (Passive)	45	          45-degrees	\par \par No tenderness with internal or external rotation or axial load.  Point tenderness over the greater trochanter with pain with resisted abduction.  Negative Trendelenburg.  No weakness to flexion, extension, abduction or adduction.  No evidence of instability.  No motor or sensory deficits.  2+ DP and PT pulses.  Skin is intact.  No scars, rashes or lesions.  \par   [de-identified] : Ambulating with a slightly antalgic to antalgic gait.  Station:  Normal.  [de-identified] : General Appearance:  Well-developed, well-nourished female in no acute distress. \par  [de-identified] : Radiographs, two views of the right hip, two views of the left hip and one view of the pelvis, show no obvious osseous abnormalities.

## 2021-09-08 NOTE — HISTORY OF PRESENT ILLNESS
[de-identified] : The patient comes in today with increasing complaints of pain to her bilateral hips.  She was seen a little over a year ago.

## 2021-09-09 ENCOUNTER — OUTPATIENT (OUTPATIENT)
Dept: OUTPATIENT SERVICES | Facility: HOSPITAL | Age: 56
LOS: 1 days | Discharge: HOME | End: 2021-09-09
Payer: COMMERCIAL

## 2021-09-09 ENCOUNTER — RESULT REVIEW (OUTPATIENT)
Age: 56
End: 2021-09-09

## 2021-09-09 VITALS
SYSTOLIC BLOOD PRESSURE: 113 MMHG | WEIGHT: 250 LBS | OXYGEN SATURATION: 98 % | HEART RATE: 68 BPM | TEMPERATURE: 98 F | HEIGHT: 66 IN | RESPIRATION RATE: 20 BRPM | DIASTOLIC BLOOD PRESSURE: 71 MMHG

## 2021-09-09 DIAGNOSIS — R13.19 OTHER DYSPHAGIA: ICD-10-CM

## 2021-09-09 DIAGNOSIS — Z87.81 PERSONAL HISTORY OF (HEALED) TRAUMATIC FRACTURE: Chronic | ICD-10-CM

## 2021-09-09 DIAGNOSIS — K21.9 GASTRO-ESOPHAGEAL REFLUX DISEASE WITHOUT ESOPHAGITIS: ICD-10-CM

## 2021-09-09 PROCEDURE — 88305 TISSUE EXAM BY PATHOLOGIST: CPT

## 2021-09-09 PROCEDURE — 88312 SPECIAL STAINS GROUP 1: CPT | Mod: 26

## 2021-09-09 PROCEDURE — 88312 SPECIAL STAINS GROUP 1: CPT

## 2021-09-09 PROCEDURE — 88305 TISSUE EXAM BY PATHOLOGIST: CPT | Mod: 26

## 2021-09-15 DIAGNOSIS — Z87.01 PERSONAL HISTORY OF PNEUMONIA (RECURRENT): ICD-10-CM

## 2021-09-15 DIAGNOSIS — R13.10 DYSPHAGIA, UNSPECIFIED: ICD-10-CM

## 2021-09-15 DIAGNOSIS — K22.2 ESOPHAGEAL OBSTRUCTION: ICD-10-CM

## 2021-09-15 DIAGNOSIS — M06.9 RHEUMATOID ARTHRITIS, UNSPECIFIED: ICD-10-CM

## 2021-09-15 DIAGNOSIS — K44.9 DIAPHRAGMATIC HERNIA WITHOUT OBSTRUCTION OR GANGRENE: ICD-10-CM

## 2021-09-15 DIAGNOSIS — Z87.891 PERSONAL HISTORY OF NICOTINE DEPENDENCE: ICD-10-CM

## 2021-09-15 DIAGNOSIS — K21.9 GASTRO-ESOPHAGEAL REFLUX DISEASE WITHOUT ESOPHAGITIS: ICD-10-CM

## 2021-09-15 DIAGNOSIS — K29.50 UNSPECIFIED CHRONIC GASTRITIS WITHOUT BLEEDING: ICD-10-CM

## 2021-09-15 DIAGNOSIS — J45.909 UNSPECIFIED ASTHMA, UNCOMPLICATED: ICD-10-CM

## 2022-03-10 NOTE — ED ADULT NURSE NOTE - CAS EDN DISCHARGE INTERVENTIONS
For information on Fall & Injury Prevention, visit: [email or web address] OR  [email or web address] OR  https://www.cdc.gov/steadi/patient.html IV discontinued, cath removed intact

## 2022-04-06 ENCOUNTER — TRANSCRIPTION ENCOUNTER (OUTPATIENT)
Age: 57
End: 2022-04-06

## 2022-04-06 ENCOUNTER — NON-APPOINTMENT (OUTPATIENT)
Age: 57
End: 2022-04-06

## 2022-04-06 PROBLEM — K50.90 CROHN'S DISEASE, UNSPECIFIED, WITHOUT COMPLICATIONS: Chronic | Status: ACTIVE | Noted: 2021-09-09

## 2022-04-07 ENCOUNTER — OUTPATIENT (OUTPATIENT)
Dept: OUTPATIENT SERVICES | Facility: HOSPITAL | Age: 57
LOS: 1 days | End: 2022-04-07

## 2022-04-07 ENCOUNTER — APPOINTMENT (OUTPATIENT)
Dept: DISASTER EMERGENCY | Facility: HOSPITAL | Age: 57
End: 2022-04-07

## 2022-04-07 VITALS
HEART RATE: 94 BPM | OXYGEN SATURATION: 99 % | DIASTOLIC BLOOD PRESSURE: 80 MMHG | SYSTOLIC BLOOD PRESSURE: 123 MMHG | RESPIRATION RATE: 15 BRPM | TEMPERATURE: 98 F

## 2022-04-07 VITALS
TEMPERATURE: 98 F | HEART RATE: 83 BPM | OXYGEN SATURATION: 97 % | RESPIRATION RATE: 17 BRPM | DIASTOLIC BLOOD PRESSURE: 73 MMHG | SYSTOLIC BLOOD PRESSURE: 102 MMHG

## 2022-04-07 DIAGNOSIS — U07.1 COVID-19: ICD-10-CM

## 2022-04-07 DIAGNOSIS — Z87.81 PERSONAL HISTORY OF (HEALED) TRAUMATIC FRACTURE: Chronic | ICD-10-CM

## 2022-04-07 RX ORDER — BEBTELOVIMAB 87.5 MG/ML
175 INJECTION, SOLUTION INTRAVENOUS ONCE
Refills: 0 | Status: COMPLETED | OUTPATIENT
Start: 2022-04-07 | End: 2022-04-07

## 2022-04-07 RX ADMIN — BEBTELOVIMAB 175 MILLIGRAM(S): 87.5 INJECTION, SOLUTION INTRAVENOUS at 12:40

## 2022-04-07 NOTE — CHART NOTE - NSCHARTNOTEFT_GEN_A_CORE
CC: Monoclonal Antibody Administration/COVID 19 Positive      History: Patient presents for administration of monoclonal antibody  Patient has been screened and was deemed to be a candidate.    Exposure: Was in [facility] last weekend, attended several events    Symptoms/ Criteria: fever cough sore throat headache    Inclusion Criteria: BMI > 40  HTN  DM  RA    Date of Positive Test: verified by clinical call center     Date of symptom onset: 4/4/22    Vaccine status: Pfizer x 3 > 8/21    PMHx:  Infection due to severe acute respiratory syndrome coronavirus 2 (SARS-CoV-2)    No pertinent past medical history    No pertinent past medical history    Rheumatoid arthritis    PNA (pneumonia)    Crohn&#x27;s disease    No pertinent past medical history    No pertinent past medical history    Rheumatoid arthritis    PNA (pneumonia)    No significant past surgical history    No significant past surgical history    History of wrist fracture    H/O fracture of wrist    No significant past surgical history    No significant past surgical history    SysAdmin_VisitLink          T(C): 36.9 (04-07-22 @ 12:32), Max: 36.9 (04-07-22 @ 12:32)  HR: 94 (04-07-22 @ 12:32) (94 - 94)  BP: 123/80 (04-07-22 @ 12:32) (123/80 - 123/80)  RR: 15 (04-07-22 @ 12:32) (15 - 15)  SpO2: 99% (04-07-22 @ 12:32) (99% - 99%)      PE:   Appearance: NAD	  HEENT:   Normal oral mucosa.   Lymphatic: No lymphadenopathy  Cardiovascular: Normal S1 S2, No JVD, No murmurs, No edema  Respiratory: Lungs clear to auscultation	  Gastrointestinal:  Soft, Non-tender. No guarding   Skin: warm and dry  Neurologic: Non-focal  Extremities: Normal range of motion.     ASSESSMENT:  Pt is a 56y year old Female with PMH DM  HTN  BMI > 40 RA  Covid +  referred to the infusion center for Monoclonal antibody administration  (Bebtelovimab).        PLAN:  - Administration procedure explained to patient   - Consent for monoclonal antibody administration obtained   - Risk & benefits discussed/all questions answered  - Administer Bebtelovimab per protocol  - will observe patient for one hour post administration  and then if stable discharge home with outpt follow up as planned by PMD.        - Patient tolerated infusion well denies complaints of chest pain/SOB/dizziness/ palpitations  - VSS for discharge home  - D/C instructions given/ fact sheet included.  - Patient to follow-up with PCP as needed.

## 2022-06-23 ENCOUNTER — FORM ENCOUNTER (OUTPATIENT)
Age: 57
End: 2022-06-23

## 2022-07-20 ENCOUNTER — FORM ENCOUNTER (OUTPATIENT)
Age: 57
End: 2022-07-20

## 2022-09-03 DIAGNOSIS — Z84.0 FAMILY HISTORY OF DISEASES OF THE SKIN AND SUBCUTANEOUS TISSUE: ICD-10-CM

## 2022-09-03 DIAGNOSIS — Z82.61 FAMILY HISTORY OF ARTHRITIS: ICD-10-CM

## 2022-09-07 ENCOUNTER — APPOINTMENT (OUTPATIENT)
Dept: FAMILY MEDICINE | Facility: CLINIC | Age: 57
End: 2022-09-07

## 2022-09-07 VITALS
OXYGEN SATURATION: 97 % | HEART RATE: 79 BPM | HEIGHT: 66 IN | WEIGHT: 235 LBS | BODY MASS INDEX: 37.77 KG/M2 | SYSTOLIC BLOOD PRESSURE: 118 MMHG | TEMPERATURE: 97.4 F | DIASTOLIC BLOOD PRESSURE: 74 MMHG

## 2022-09-07 DIAGNOSIS — Z23 ENCOUNTER FOR IMMUNIZATION: ICD-10-CM

## 2022-09-07 DIAGNOSIS — Z00.00 ENCOUNTER FOR GENERAL ADULT MEDICAL EXAMINATION W/OUT ABNORMAL FINDINGS: ICD-10-CM

## 2022-09-07 DIAGNOSIS — K52.9 NONINFECTIVE GASTROENTERITIS AND COLITIS, UNSPECIFIED: ICD-10-CM

## 2022-09-07 PROCEDURE — 36415 COLL VENOUS BLD VENIPUNCTURE: CPT

## 2022-09-07 PROCEDURE — G0009: CPT

## 2022-09-07 PROCEDURE — 90677 PCV20 VACCINE IM: CPT

## 2022-09-07 PROCEDURE — 99396 PREV VISIT EST AGE 40-64: CPT | Mod: 25

## 2022-09-07 PROCEDURE — 90686 IIV4 VACC NO PRSV 0.5 ML IM: CPT

## 2022-09-07 PROCEDURE — 90472 IMMUNIZATION ADMIN EACH ADD: CPT

## 2022-09-07 RX ORDER — AZITHROMYCIN 250 MG/1
250 TABLET, FILM COATED ORAL
Qty: 6 | Refills: 0 | Status: DISCONTINUED | COMMUNITY
Start: 2022-07-29

## 2022-09-07 RX ORDER — MUPIROCIN 20 MG/G
2 OINTMENT TOPICAL
Qty: 22 | Refills: 0 | Status: ACTIVE | COMMUNITY
Start: 2022-04-06

## 2022-09-07 RX ORDER — ASPIRIN ENTERIC COATED TABLETS 81 MG 81 MG/1
81 TABLET, DELAYED RELEASE ORAL
Refills: 0 | Status: ACTIVE | COMMUNITY

## 2022-09-07 RX ORDER — ERGOCALCIFEROL 1.25 MG/1
1.25 MG CAPSULE, LIQUID FILLED ORAL
Qty: 4 | Refills: 0 | Status: DISCONTINUED | COMMUNITY
Start: 2022-06-09

## 2022-09-07 RX ORDER — SODIUM HYALURONATE INTRA-ARTICULAR SOLN PREF SYR 25 MG/2.5ML 25/2.5 MG/ML
25 SOLUTION PREFILLED SYRINGE INTRAARTICULAR
Qty: 5 | Refills: 0 | Status: DISCONTINUED | OUTPATIENT
Start: 2020-05-18 | End: 2022-09-07

## 2022-09-07 RX ORDER — ATORVASTATIN CALCIUM 20 MG/1
20 TABLET, FILM COATED ORAL
Refills: 0 | Status: ACTIVE | COMMUNITY

## 2022-09-07 RX ORDER — NITROFURANTOIN (MONOHYDRATE/MACROCRYSTALS) 25; 75 MG/1; MG/1
100 CAPSULE ORAL
Qty: 14 | Refills: 0 | Status: DISCONTINUED | COMMUNITY
Start: 2022-03-21

## 2022-09-07 NOTE — HISTORY OF PRESENT ILLNESS
[de-identified] : Her last physical exam was last year\par \par Vaccines:\par Tetanus is up to date; recalls had tetanus booster in 2016 in ED\par Pneumococcal vaccination is NOT up to date (recommended due to her RA)\par Shingrix is NOT up to date; recommended, will d/w rheum and consider\par COVID vaccine is up to date\par \par Her last dentist visit was less than one year ago\par Her last eye doctor appointment is unknown.\par \par GYN visit is up to date; last in 2022, Dr. Mas/Elenita Garcia \par Mammogram is up to date; last in 6/2022 wnl per pt mammo and breast US- [facility]\par Colon cancer screening is up to date?; colonoscopy 2/2019 polyps, rpt due 3 years and has scheduled for near future with Dr. Mayes (will be having EGD and colonoscopy)\par DEXA is up to date, 6/2022 new dx of osteoporosis L spine T score -2.6 was lowest T score\par \par Her diet is healthy overall, losing weight slowly with smaller portions/healthy choices\par Exercise: rarely but will be working to get back on track \par \par Jahaira has h/o IFG, high chol, RA, Crohn's/ileitis, asthma and anxiety and vit D insufficiency. She was also recently dx with osteoporosis. \par \par She is followed by Dr. Aston Saxena for her RA and is UTD on visits with him. Does not feel like Humira is helping her like it used to. Might want to change rheum providers so I gave her names of [facility] [address] [facility] Ryan as a great option. \par \par She has been seeing Aretha Young/Dr. Mayes re dysphagia sxs and possible Crohn's/ileitis and is undergoing eval of those sxs. Will be having colonoscopy and EGD in near future.  Dr. Preston was thinking she might have Crohn's related to RA. Dr. Mayes thinks this is less likely though she will be having colonoscopy in near future to do biopsies to definitively evaluate. \par \par Dr. Fereman is her pulm (asthma), Shree Maurer/Dr. Chacko is card team and she is UTD on f/u eval and testing. Was started on statin med recently (Atorv 20 mg) as her lipids were elevated in 6/2022 on card labs and is tolerated well. \par \par Had low vit D and Shree Rx Rx vit D 6/2022 and she did this for 6 weeks and then stopped and has not yet started OTC vit D supplement. Had lipids and LFTs checked in 7/2022 and was started on statin at that time

## 2022-09-07 NOTE — PHYSICAL EXAM
[No Acute Distress] : no acute distress [Well Developed] : well developed [Well-Appearing] : well-appearing [Normal Sclera/Conjunctiva] : normal sclera/conjunctiva [EOMI] : extraocular movements intact [Normal Outer Ear/Nose] : the outer ears and nose were normal in appearance [No JVD] : no jugular venous distention [No Lymphadenopathy] : no lymphadenopathy [Supple] : supple [Thyroid Normal, No Nodules] : the thyroid was normal and there were no nodules present [No Respiratory Distress] : no respiratory distress  [No Accessory Muscle Use] : no accessory muscle use [Clear to Auscultation] : lungs were clear to auscultation bilaterally [Normal Rate] : normal rate  [Regular Rhythm] : with a regular rhythm [Normal S1, S2] : normal S1 and S2 [No Murmur] : no murmur heard [No Carotid Bruits] : no carotid bruits [No Varicosities] : no varicosities [Pedal Pulses Present] : the pedal pulses are present [No Edema] : there was no peripheral edema [No Extremity Clubbing/Cyanosis] : no extremity clubbing/cyanosis [Soft] : abdomen soft [Non Tender] : non-tender [Non-distended] : non-distended [No Masses] : no abdominal mass palpated [No HSM] : no HSM [Normal Bowel Sounds] : normal bowel sounds [Normal Posterior Cervical Nodes] : no posterior cervical lymphadenopathy [Normal Anterior Cervical Nodes] : no anterior cervical lymphadenopathy [No Joint Swelling] : no joint swelling [Grossly Normal Strength/Tone] : grossly normal strength/tone [No Rash] : no rash [Coordination Grossly Intact] : coordination grossly intact [No Focal Deficits] : no focal deficits [Normal Gait] : normal gait [Normal Affect] : the affect was normal [Normal Insight/Judgement] : insight and judgment were intact [de-identified] : +obesity but her wgt is down 15# in past year!  [de-identified] : no s/sxs acute synovitis on labs

## 2022-09-07 NOTE — ASSESSMENT
[FreeTextEntry1] : GIO MENDIETA is a 57 year old female here for a physical exam.  She is also here to follow up on medical issues as noted above.\par \par Patient has a history of anxiety, asthma, impaired fasting glucose, hyperlipidemia, and rheumatoid arthritis and osteoporosis

## 2022-09-07 NOTE — HEALTH RISK ASSESSMENT
[0] : 2) Feeling down, depressed, or hopeless: Not at all (0) [PHQ-2 Negative - No further assessment needed] : PHQ-2 Negative - No further assessment needed [[id number]] : 0

## 2022-09-07 NOTE — REVIEW OF SYSTEMS
[Abdominal Pain] : abdominal pain [Joint Pain] : joint pain [Joint Stiffness] : joint stiffness [Negative] : Heme/Lymph [Fever] : no fever [Chills] : no chills [Fatigue] : no fatigue [Recent Change In Weight] : ~T recent weight change [Constipation] : no constipation [Diarrhea] : diarrhea [Vomiting] : no vomiting [Heartburn] : no heartburn [Melena] : no melena [FreeTextEntry2] : has been working to lose wgt through healthier choices and smaller portions [FreeTextEntry7] : see HPI [FreeTextEntry9] : OA and RA related joint pain and stiffness

## 2022-09-07 NOTE — PLAN
[FreeTextEntry1] : Will cc labs to Dr. Mayes, Mackenzie Maurer, Dr. Saxena \par \par Continue all medications as prescribed. Check labs as above. Will adjust any medications based upon lab results.\par \par Reviewed age-appropriate preventive screening tests with patient. UTD mammogram, gyn exam and DEXA. Due for  colonoscopy and has scheduled for near future. \par \par Shingrix, PCV 20 (due to her RA) and flu vacc all revd/recommended and she will consider Shingrix and consents to Flu vacc and Prevnar 20 today \par \par BP goal is <=135/85 on average on home checks. Advised to let us know if BPs are above goal on home checks. \par \par LDL goal <=100 ideally.   Reviewed risks/benefits of statin med.  Recommended excellent hydration +/- co Q10 (100-400 mg daily) to decrease risk for statin related myalgias. \par \par Will check lipids today to check effect of newly added statin and adjust dose if needed. \par \par Discussed clean eating (eg Mediterranean style eating plan) and regular exercise/staying as physically active as possible.  Include balance exercises and strength training and core strengthening exercises for bone health and to decrease risk for falls. \par \par Revd diagnosis of osteoporosis, increased risk for fractures and related consequences and Rx medication options to treat including usually oral bisphosphonates (eg Alendronate, Risedronate) first line and r/b/se of these incl GI SE and increased risk for jaw osteonecrosis and atypical femur fractures mainly seen in patients with prolonged/long term use. She will d/w rheum specialist for recommendation and can also d/w GI and if Dr. Mayes prefers that she avoid oral bisphosphonate meds then she can d/w rheum about consideration of other treatments (eg Prolia, Forteo, Reclast). Reviewed given her relatively young age at dx of osteoporosis and h/o periodic need for steroid use for rheum/pulm issues I strongly recommend she start some Rx med for OP and also she needs to add strength training/regular exercise. Recommend repeat DEXA at 2 year corey. \par \par Recommended calcium 6717-4088 mg daily ideally mainly or fully from food sources +/- supplement if needed, vit D 3001-9491 IU or whatever dose is needed to get D into 30-80 range. Recommended regular weight bearing exercise as well as strength training exercise 3 or more times per week and balance exercises regularly. \par \par Cont f/u with rheum and GI and card and pulm as recommended by them. \par \par Reviewed importance of good self care (e.g. meditation, yoga, adequate rest, regular exercise, magnesium, clean eating, etc.).\par \par Follow up for next physical in one year. RPA visit (chol, IFG etc) in 6 months unless is having these labs checked with rheum around then, in which case she can defer on 6 month visit here and just f/u in a year.

## 2022-09-09 LAB
25(OH)D3 SERPL-MCNC: 30.9 NG/ML
ALBUMIN SERPL ELPH-MCNC: 4.5 G/DL
ALP BLD-CCNC: 111 U/L
ALT SERPL-CCNC: 19 U/L
ANION GAP SERPL CALC-SCNC: 13 MMOL/L
AST SERPL-CCNC: 17 U/L
BASOPHILS # BLD AUTO: 0.09 K/UL
BASOPHILS NFR BLD AUTO: 1.1 %
BILIRUB SERPL-MCNC: 0.3 MG/DL
BUN SERPL-MCNC: 15 MG/DL
CALCIUM SERPL-MCNC: 10.1 MG/DL
CHLORIDE SERPL-SCNC: 105 MMOL/L
CHOLEST SERPL-MCNC: 200 MG/DL
CO2 SERPL-SCNC: 25 MMOL/L
CREAT SERPL-MCNC: 0.76 MG/DL
EGFR: 92 ML/MIN/1.73M2
EOSINOPHIL # BLD AUTO: 0.34 K/UL
EOSINOPHIL NFR BLD AUTO: 4 %
GLUCOSE SERPL-MCNC: 95 MG/DL
HCT VFR BLD CALC: 45.5 %
HDLC SERPL-MCNC: 55 MG/DL
HGB BLD-MCNC: 14.1 G/DL
IMM GRANULOCYTES NFR BLD AUTO: 0.2 %
LDLC SERPL CALC-MCNC: 108 MG/DL
LYMPHOCYTES # BLD AUTO: 1.61 K/UL
LYMPHOCYTES NFR BLD AUTO: 18.9 %
MAN DIFF?: NORMAL
MCHC RBC-ENTMCNC: 29.1 PG
MCHC RBC-ENTMCNC: 31 GM/DL
MCV RBC AUTO: 93.8 FL
MONOCYTES # BLD AUTO: 0.51 K/UL
MONOCYTES NFR BLD AUTO: 6 %
NEUTROPHILS # BLD AUTO: 5.94 K/UL
NEUTROPHILS NFR BLD AUTO: 69.8 %
NONHDLC SERPL-MCNC: 145 MG/DL
PLATELET # BLD AUTO: 363 K/UL
POTASSIUM SERPL-SCNC: 5 MMOL/L
PROT SERPL-MCNC: 7.1 G/DL
RBC # BLD: 4.85 M/UL
RBC # FLD: 13.1 %
SODIUM SERPL-SCNC: 143 MMOL/L
TRIGL SERPL-MCNC: 183 MG/DL
TSH SERPL-ACNC: 1.33 UIU/ML
WBC # FLD AUTO: 8.51 K/UL

## 2022-09-12 ENCOUNTER — NON-APPOINTMENT (OUTPATIENT)
Age: 57
End: 2022-09-12

## 2022-10-18 ENCOUNTER — OUTPATIENT (OUTPATIENT)
Dept: OUTPATIENT SERVICES | Facility: HOSPITAL | Age: 57
LOS: 1 days | Discharge: HOME | End: 2022-10-18
Payer: COMMERCIAL

## 2022-10-18 ENCOUNTER — RESULT REVIEW (OUTPATIENT)
Age: 57
End: 2022-10-18

## 2022-10-18 VITALS
WEIGHT: 229.94 LBS | OXYGEN SATURATION: 97 % | TEMPERATURE: 98 F | RESPIRATION RATE: 20 BRPM | DIASTOLIC BLOOD PRESSURE: 74 MMHG | HEART RATE: 67 BPM | SYSTOLIC BLOOD PRESSURE: 114 MMHG | HEIGHT: 66 IN

## 2022-10-18 DIAGNOSIS — Z87.81 PERSONAL HISTORY OF (HEALED) TRAUMATIC FRACTURE: Chronic | ICD-10-CM

## 2022-10-18 DIAGNOSIS — R19.4 CHANGE IN BOWEL HABIT: ICD-10-CM

## 2022-10-18 DIAGNOSIS — R13.10 DYSPHAGIA, UNSPECIFIED: ICD-10-CM

## 2022-10-18 PROCEDURE — 88313 SPECIAL STAINS GROUP 2: CPT | Mod: 26

## 2022-10-18 PROCEDURE — 88312 SPECIAL STAINS GROUP 1: CPT

## 2022-10-18 PROCEDURE — 88313 SPECIAL STAINS GROUP 2: CPT

## 2022-10-18 PROCEDURE — 88305 TISSUE EXAM BY PATHOLOGIST: CPT

## 2022-10-18 PROCEDURE — 88305 TISSUE EXAM BY PATHOLOGIST: CPT | Mod: 26

## 2022-10-18 PROCEDURE — C1726: CPT

## 2022-10-18 PROCEDURE — 88312 SPECIAL STAINS GROUP 1: CPT | Mod: 26

## 2022-10-18 NOTE — ASU PATIENT PROFILE, ADULT - NSICDXPASTMEDICALHX_GEN_ALL_CORE_FT
PAST MEDICAL HISTORY:  PNA (pneumonia)     Rheumatoid arthritis      PAST MEDICAL HISTORY:  Crohn's disease     H/O osteoporosis     PNA (pneumonia)     Rheumatoid arthritis

## 2022-10-19 PROBLEM — Z87.39 PERSONAL HISTORY OF OTHER DISEASES OF THE MUSCULOSKELETAL SYSTEM AND CONNECTIVE TISSUE: Chronic | Status: ACTIVE | Noted: 2022-10-18

## 2022-10-20 ENCOUNTER — APPOINTMENT (OUTPATIENT)
Dept: ORTHOPEDIC SURGERY | Facility: CLINIC | Age: 57
End: 2022-10-20

## 2022-10-20 VITALS
WEIGHT: 230 LBS | HEART RATE: 69 BPM | BODY MASS INDEX: 36.96 KG/M2 | DIASTOLIC BLOOD PRESSURE: 80 MMHG | SYSTOLIC BLOOD PRESSURE: 119 MMHG | HEIGHT: 66 IN

## 2022-10-20 PROCEDURE — 20610 DRAIN/INJ JOINT/BURSA W/O US: CPT | Mod: LT

## 2022-10-20 PROCEDURE — 72170 X-RAY EXAM OF PELVIS: CPT

## 2022-10-20 PROCEDURE — 73560 X-RAY EXAM OF KNEE 1 OR 2: CPT | Mod: RT

## 2022-10-20 PROCEDURE — 99213 OFFICE O/P EST LOW 20 MIN: CPT | Mod: 25

## 2022-10-20 RX ORDER — HYALURONATE SODIUM 10 MG/ML
25 SYRINGE (ML) INTRAARTICULAR
Qty: 5 | Refills: 0 | Status: ACTIVE | OUTPATIENT
Start: 2022-10-20

## 2022-10-21 DIAGNOSIS — E78.00 PURE HYPERCHOLESTEROLEMIA, UNSPECIFIED: ICD-10-CM

## 2022-10-21 DIAGNOSIS — K29.80 DUODENITIS WITHOUT BLEEDING: ICD-10-CM

## 2022-10-21 DIAGNOSIS — M06.9 RHEUMATOID ARTHRITIS, UNSPECIFIED: ICD-10-CM

## 2022-10-21 DIAGNOSIS — K31.A0 GASTRIC INTESTINAL METAPLASIA, UNSPECIFIED: ICD-10-CM

## 2022-10-21 DIAGNOSIS — R13.10 DYSPHAGIA, UNSPECIFIED: ICD-10-CM

## 2022-10-21 DIAGNOSIS — M81.0 AGE-RELATED OSTEOPOROSIS WITHOUT CURRENT PATHOLOGICAL FRACTURE: ICD-10-CM

## 2022-10-21 DIAGNOSIS — K22.2 ESOPHAGEAL OBSTRUCTION: ICD-10-CM

## 2022-10-21 DIAGNOSIS — K29.50 UNSPECIFIED CHRONIC GASTRITIS WITHOUT BLEEDING: ICD-10-CM

## 2022-10-21 DIAGNOSIS — Z86.010 PERSONAL HISTORY OF COLONIC POLYPS: ICD-10-CM

## 2022-10-21 DIAGNOSIS — E66.01 MORBID (SEVERE) OBESITY DUE TO EXCESS CALORIES: ICD-10-CM

## 2022-10-21 DIAGNOSIS — Z09 ENCOUNTER FOR FOLLOW-UP EXAMINATION AFTER COMPLETED TREATMENT FOR CONDITIONS OTHER THAN MALIGNANT NEOPLASM: ICD-10-CM

## 2022-10-21 DIAGNOSIS — K63.89 OTHER SPECIFIED DISEASES OF INTESTINE: ICD-10-CM

## 2022-10-21 DIAGNOSIS — K57.30 DIVERTICULOSIS OF LARGE INTESTINE WITHOUT PERFORATION OR ABSCESS WITHOUT BLEEDING: ICD-10-CM

## 2022-10-21 DIAGNOSIS — K20.90 ESOPHAGITIS, UNSPECIFIED WITHOUT BLEEDING: ICD-10-CM

## 2022-10-21 RX ORDER — HYALURONATE SODIUM 20 MG/2 ML
20 SYRINGE (ML) INTRAARTICULAR
Qty: 3 | Refills: 0 | Status: ACTIVE | OUTPATIENT
Start: 2022-10-21

## 2022-10-24 NOTE — HISTORY OF PRESENT ILLNESS
[de-identified] : The patient comes in today for her bilateral hips and right knee.  She hasn't been seen since September of 2021.  She has some increasing complaints of pain to both hips.  She states her left knee is feeling great.  She has done well with the visco.  She states her right knee is getting somewhat worse.

## 2022-10-24 NOTE — ADDENDUM
[FreeTextEntry1] : This note was written by Judith Sullivan on 10/24/2022 acting as a scribe for HARVEY FLANNERY III, MD

## 2022-10-24 NOTE — PROCEDURE
[de-identified] : Indication:  Trochanteric Bursitis of Left Hip\par \par Consent: \par At this time, I have recommended an injection to the left hip.  The risks and benefits of the procedure were discussed with the patient in detail.  Upon verbal consent of the patient, we proceeded with the injection as noted below.  \par \par Procedure:  \par After a sterile prep, the patient underwent an injection of approximately 3mL of 1% Xylocaine 20 mg per 2 mL (10 mg per mL) without epinephrine and 2 mL of Kenalog (40mg/mL) into the left hip.  The patient tolerated the procedure well.  There were no complications.  \par \par : Novatris\par Drug name:     Xylocaine-MPF (Lidocaine HCI Injection, USP)\par NDC #:            27447-508-08\par Lot #:              [id number]\par Expiration:      05/26

## 2022-10-24 NOTE — DISCUSSION/SUMMARY
[de-identified] : At this time, due to trochanteric bursitis of bilateral hips, I recommend a cortisone injection to the left hip (as noted above).  I recommend ice and elevation. She will be reassessed in two weeks for possible injection into the right hip.  As far as osteoarthritis of the right knee, I recommend she undergo a repeat course of viscosupplementation.

## 2022-10-24 NOTE — PHYSICAL EXAM
[de-identified] : Right Knee: \par Range of Motion in Degrees	\par 	                  Claimant:	Normal:	\par Flexion Active	  135 	                135-degrees	\par Flexion Passive	  135	                135-degrees	\par Extension Active	  0-5	                0-5-degrees	\par Extension Passive	  0-5	                0-5-degrees	\par \par No weakness to flexion/extension.  No evidence of instability in the AP plane or varus or valgus stress.  Negative  Lachman.  Negative pivot shift.  Negative anterior drawer test.  Negative posterior drawer test.  Negative Re.  Negative Apley grind.  No medial or lateral joint line tenderness.  Positive tenderness over the medial and lateral facet of the patella.  Positive patellofemoral crepitations.  No lateral tilting patella.  No patella apprehension.  Positive crepitation in the medial and lateral femoral condyle.  No proximal or distal swelling, edema or tenderness.  No gross motor or sensory deficits.  Mild intra-articular swelling.  2+ DP and PT pulses.  No varus or valgus malalignment.  Skin is intact.  No rashes, scars or lesions.  \par \par Right Hip: \par Range of Motion in Degrees:\par 	                                 Claimant:	          Normal:	\par Flexion (Active) 	                 120 	          120-degrees	\par Flexion (Passive)	                 120	          120-degrees	\par Extension (Active)	                 -30	          -30-degrees	\par Extension (Passive)	 -30	          -30-degrees	\par Abduction (Active)	                45-50	          71-78-wfbmepa	\par Abduction (Passive)	45-50	          97-23-qvmnwac	\par Adduction (Active)                	20-30	          39-41-xhtnpke	\par Adduction (Passive)	20-30	          27-00-lkhovyz	\par Internal Rotation (Active) 	35	          35-degrees	\par Internal Rotation (Passive)	35	          35-degrees	\par External Rotation (Active)	45	          45-degrees	\par External Rotation (Passive)	45	          45-degrees	\par \par No tenderness with internal or external rotation or axial load.  Point tenderness over the greater trochanter with pain with resisted abduction.  Negative Trendelenburg.  No weakness to flexion, extension, abduction or adduction.  No evidence of instability.  No motor or sensory deficits.  2+ DP and PT pulses.  Skin is intact.  No scars, rashes or lesions.  \par  \par Left Hip: \par Range of Motion in Degrees:\par 	                                 Claimant:	          Normal:	\par Flexion (Active) 	                 120 	          120-degrees	\par Flexion (Passive)	                 120	          120-degrees	\par Extension (Active)	                 -30	          -30-degrees	\par Extension (Passive)	 -30	          -30-degrees	\par Abduction (Active)	                45-50	          37-13-qnjoujo	\par Abduction (Passive)	45-50	          54-83-uyvdhzn	\par Adduction (Active)                	20-30	          81-22-lofwpgg	\par Adduction (Passive)	20-30	          97-14-sblajvr	\par Internal Rotation (Active) 	35	          35-degrees	\par Internal Rotation (Passive)	35	          35-degrees	\par External Rotation (Active)	45	          45-degrees	\par External Rotation (Passive)	45	          45-degrees	\par \par No tenderness with internal or external rotation or axial load.  Point tenderness over the greater trochanter with pain with resisted abduction.  Negative Trendelenburg.  No weakness to flexion, extension, abduction or adduction.  No evidence of instability.  No motor or sensory deficits.  2+ DP and PT pulses.  Skin is intact.  No scars, rashes or lesions.  \par   [de-identified] : Ambulating with a slightly antalgic to antalgic gait.  Station:  Normal.  [de-identified] : Appearance:  Well-developed, well-nourished female in no acute distress.\par   [de-identified] : Radiographs, which were taken in the office today, two views of the right knee, show moderate to early severe degenerative changes.\par \par Radiographs, which were taken in the office today, one view of the pelvis, show no obvious osseous abnormality.

## 2022-11-07 ENCOUNTER — APPOINTMENT (OUTPATIENT)
Dept: ORTHOPEDIC SURGERY | Facility: CLINIC | Age: 57
End: 2022-11-07

## 2022-11-07 NOTE — DISCHARGE NOTE ADULT - PROVIDER TOKENS
November 7, 2022      [facility]  [address] [place] [address], [address]  [place] [address]  Phone: [phone]  Fax: [phone]       Patient: Michelet Crespo   YOB: 1991  Date of Visit: 11/07/2022    To Whom It May Concern:    Walter Crespo  was at [facility] on 11/07/2022. The patient may return to work/school on 11/9/2022 with no restrictions. If you have any questions or concerns, or if I can be of further assistance, please do not hesitate to contact me.    Sincerely,    Heide Walsh PA     
PAIGE:'[id number]'

## 2022-11-10 ENCOUNTER — APPOINTMENT (OUTPATIENT)
Dept: ORTHOPEDIC SURGERY | Facility: CLINIC | Age: 57
End: 2022-11-10

## 2022-11-10 PROCEDURE — 20610 DRAIN/INJ JOINT/BURSA W/O US: CPT | Mod: LT

## 2022-11-10 RX ORDER — HYALURONATE SODIUM 20 MG/2 ML
20 SYRINGE (ML) INTRAARTICULAR
Qty: 3 | Refills: 0 | Status: ACTIVE | OUTPATIENT
Start: 2022-11-10

## 2022-11-14 NOTE — PHYSICAL EXAM
[de-identified] : Left Knee: \par Range of Motion in Degrees	\par 	                  Claimant:	Normal:	\par Flexion Active	  135 	                135-degrees	\par Flexion Passive	  135	                135-degrees	\par Extension Active	  0-5	                0-5-degrees	\par Extension Passive	  0-5	                0-5-degrees	\par \par No weakness to flexion/extension.  No evidence of instability in the AP plane or varus or valgus stress.  Negative  Lachman.  Negative pivot shift.  Negative anterior drawer test.  Negative posterior drawer test.  Negative Re.  Negative Apley grind.  No medial or lateral joint line tenderness.  Positive tenderness over the medial and lateral facet of the patella.  Positive patellofemoral crepitations.  No lateral tilting patella.  No patella apprehension.  Positive crepitation in the medial and lateral femoral condyle.  No proximal or distal swelling, edema or tenderness.  No gross motor or sensory deficits.  Mild intra-articular swelling.  2+ DP and PT pulses.  No varus or valgus malalignment.  Skin is intact.  No rashes, scars or lesions.

## 2022-11-14 NOTE — ADDENDUM
[FreeTextEntry1] : This note was written by Judith Sullivan on 11/14/2022 acting as a scribe for HARVEY FLANNERY III, MD

## 2022-11-14 NOTE — PROCEDURE
[de-identified] : Indication: Osteoarthritis of the Left Knee\par Consent:\par The risks and benefits of the procedure were discussed with the patient in detail.  Upon verbal consent of the patient, we proceeded with the Euflexxa injection as noted below.  \par \par Procedure:\par Under sterile conditions, the patient underwent a Euflexxa injection to the left knee of 20 mg sodium Hyaluronate, 17 mg sodium chloride, 1.12 mg disodium hydrogen phosphate dodecahydrate, .10 mg sodium dihydrogen phosphate dehydrate in a 2 ml syringe without any complications. The patient tolerated this well. \par \par : [employer]\par NDC #:  [address]-9\par Lot #:    [id number]\par Expiration:  9/19/2023\par \par Plan: \par I have recommended ice and elevation.  The patient will be reassessed in one week for the next Euflexxa injection for the osteoarthritis of the left knee.

## 2022-11-17 ENCOUNTER — EMERGENCY (EMERGENCY)
Facility: HOSPITAL | Age: 57
LOS: 0 days | Discharge: HOME | End: 2022-11-18
Attending: EMERGENCY MEDICINE
Payer: COMMERCIAL

## 2022-11-17 ENCOUNTER — APPOINTMENT (OUTPATIENT)
Dept: ORTHOPEDIC SURGERY | Facility: CLINIC | Age: 57
End: 2022-11-17

## 2022-11-17 VITALS — HEIGHT: 66 IN | WEIGHT: 229.94 LBS

## 2022-11-17 DIAGNOSIS — N39.0 URINARY TRACT INFECTION, SITE NOT SPECIFIED: ICD-10-CM

## 2022-11-17 DIAGNOSIS — E78.5 HYPERLIPIDEMIA, UNSPECIFIED: ICD-10-CM

## 2022-11-17 DIAGNOSIS — Z79.82 LONG TERM (CURRENT) USE OF ASPIRIN: ICD-10-CM

## 2022-11-17 DIAGNOSIS — R07.89 OTHER CHEST PAIN: ICD-10-CM

## 2022-11-17 DIAGNOSIS — M06.9 RHEUMATOID ARTHRITIS, UNSPECIFIED: ICD-10-CM

## 2022-11-17 DIAGNOSIS — Z87.81 PERSONAL HISTORY OF (HEALED) TRAUMATIC FRACTURE: Chronic | ICD-10-CM

## 2022-11-17 DIAGNOSIS — K50.90 CROHN'S DISEASE, UNSPECIFIED, WITHOUT COMPLICATIONS: ICD-10-CM

## 2022-11-17 LAB
ALBUMIN SERPL ELPH-MCNC: 3.1 G/DL — LOW (ref 3.3–5)
ALP SERPL-CCNC: 105 U/L — SIGNIFICANT CHANGE UP (ref 40–120)
ALT FLD-CCNC: 21 U/L — SIGNIFICANT CHANGE UP (ref 12–78)
ANION GAP SERPL CALC-SCNC: 4 MMOL/L — LOW (ref 5–17)
APPEARANCE UR: CLEAR — SIGNIFICANT CHANGE UP
AST SERPL-CCNC: 13 U/L — LOW (ref 15–37)
BASOPHILS # BLD AUTO: 0.06 K/UL — SIGNIFICANT CHANGE UP (ref 0–0.2)
BASOPHILS NFR BLD AUTO: 0.7 % — SIGNIFICANT CHANGE UP (ref 0–2)
BILIRUB SERPL-MCNC: 0.3 MG/DL — SIGNIFICANT CHANGE UP (ref 0.2–1.2)
BILIRUB UR-MCNC: NEGATIVE — SIGNIFICANT CHANGE UP
BUN SERPL-MCNC: 23 MG/DL — SIGNIFICANT CHANGE UP (ref 7–23)
CALCIUM SERPL-MCNC: 9 MG/DL — SIGNIFICANT CHANGE UP (ref 8.5–10.1)
CHLORIDE SERPL-SCNC: 108 MMOL/L — SIGNIFICANT CHANGE UP (ref 96–108)
CO2 SERPL-SCNC: 27 MMOL/L — SIGNIFICANT CHANGE UP (ref 22–31)
COLOR SPEC: YELLOW — SIGNIFICANT CHANGE UP
CREAT SERPL-MCNC: 0.75 MG/DL — SIGNIFICANT CHANGE UP (ref 0.5–1.3)
DIFF PNL FLD: ABNORMAL
EGFR: 93 ML/MIN/1.73M2 — SIGNIFICANT CHANGE UP
EOSINOPHIL # BLD AUTO: 0.36 K/UL — SIGNIFICANT CHANGE UP (ref 0–0.5)
EOSINOPHIL NFR BLD AUTO: 3.9 % — SIGNIFICANT CHANGE UP (ref 0–6)
GLUCOSE SERPL-MCNC: 103 MG/DL — HIGH (ref 70–99)
GLUCOSE UR QL: NEGATIVE — SIGNIFICANT CHANGE UP
HCT VFR BLD CALC: 39.5 % — SIGNIFICANT CHANGE UP (ref 34.5–45)
HGB BLD-MCNC: 12.9 G/DL — SIGNIFICANT CHANGE UP (ref 11.5–15.5)
IMM GRANULOCYTES NFR BLD AUTO: 0.2 % — SIGNIFICANT CHANGE UP (ref 0–0.9)
KETONES UR-MCNC: ABNORMAL
LEUKOCYTE ESTERASE UR-ACNC: ABNORMAL
LYMPHOCYTES # BLD AUTO: 1.99 K/UL — SIGNIFICANT CHANGE UP (ref 1–3.3)
LYMPHOCYTES # BLD AUTO: 21.8 % — SIGNIFICANT CHANGE UP (ref 13–44)
MAGNESIUM SERPL-MCNC: 2.2 MG/DL — SIGNIFICANT CHANGE UP (ref 1.6–2.6)
MCHC RBC-ENTMCNC: 29.5 PG — SIGNIFICANT CHANGE UP (ref 27–34)
MCHC RBC-ENTMCNC: 32.7 GM/DL — SIGNIFICANT CHANGE UP (ref 32–36)
MCV RBC AUTO: 90.2 FL — SIGNIFICANT CHANGE UP (ref 80–100)
MONOCYTES # BLD AUTO: 0.6 K/UL — SIGNIFICANT CHANGE UP (ref 0–0.9)
MONOCYTES NFR BLD AUTO: 6.6 % — SIGNIFICANT CHANGE UP (ref 2–14)
NEUTROPHILS # BLD AUTO: 6.11 K/UL — SIGNIFICANT CHANGE UP (ref 1.8–7.4)
NEUTROPHILS NFR BLD AUTO: 66.8 % — SIGNIFICANT CHANGE UP (ref 43–77)
NITRITE UR-MCNC: NEGATIVE — SIGNIFICANT CHANGE UP
PH UR: 5 — SIGNIFICANT CHANGE UP (ref 5–8)
PLATELET # BLD AUTO: 309 K/UL — SIGNIFICANT CHANGE UP (ref 150–400)
POTASSIUM SERPL-MCNC: 3.9 MMOL/L — SIGNIFICANT CHANGE UP (ref 3.5–5.3)
POTASSIUM SERPL-SCNC: 3.9 MMOL/L — SIGNIFICANT CHANGE UP (ref 3.5–5.3)
PROT SERPL-MCNC: 6.8 GM/DL — SIGNIFICANT CHANGE UP (ref 6–8.3)
PROT UR-MCNC: 30 MG/DL
RBC # BLD: 4.38 M/UL — SIGNIFICANT CHANGE UP (ref 3.8–5.2)
RBC # FLD: 12.8 % — SIGNIFICANT CHANGE UP (ref 10.3–14.5)
SODIUM SERPL-SCNC: 139 MMOL/L — SIGNIFICANT CHANGE UP (ref 135–145)
SP GR SPEC: 1.02 — SIGNIFICANT CHANGE UP (ref 1.01–1.02)
TROPONIN I, HIGH SENSITIVITY RESULT: 6.15 NG/L — SIGNIFICANT CHANGE UP
UROBILINOGEN FLD QL: NEGATIVE — SIGNIFICANT CHANGE UP
WBC # BLD: 9.14 K/UL — SIGNIFICANT CHANGE UP (ref 3.8–10.5)
WBC # FLD AUTO: 9.14 K/UL — SIGNIFICANT CHANGE UP (ref 3.8–10.5)

## 2022-11-17 PROCEDURE — 96374 THER/PROPH/DIAG INJ IV PUSH: CPT

## 2022-11-17 PROCEDURE — 80053 COMPREHEN METABOLIC PANEL: CPT

## 2022-11-17 PROCEDURE — 83735 ASSAY OF MAGNESIUM: CPT

## 2022-11-17 PROCEDURE — 81001 URINALYSIS AUTO W/SCOPE: CPT

## 2022-11-17 PROCEDURE — 93010 ELECTROCARDIOGRAM REPORT: CPT

## 2022-11-17 PROCEDURE — 71046 X-RAY EXAM CHEST 2 VIEWS: CPT | Mod: 26

## 2022-11-17 PROCEDURE — 85025 COMPLETE CBC W/AUTO DIFF WBC: CPT

## 2022-11-17 PROCEDURE — 36415 COLL VENOUS BLD VENIPUNCTURE: CPT

## 2022-11-17 PROCEDURE — 99285 EMERGENCY DEPT VISIT HI MDM: CPT

## 2022-11-17 PROCEDURE — 99285 EMERGENCY DEPT VISIT HI MDM: CPT | Mod: 25

## 2022-11-17 PROCEDURE — 20610 DRAIN/INJ JOINT/BURSA W/O US: CPT | Mod: 50

## 2022-11-17 PROCEDURE — 71046 X-RAY EXAM CHEST 2 VIEWS: CPT

## 2022-11-17 PROCEDURE — 84484 ASSAY OF TROPONIN QUANT: CPT

## 2022-11-17 PROCEDURE — 93005 ELECTROCARDIOGRAM TRACING: CPT

## 2022-11-17 NOTE — ED STATDOCS - PROGRESS NOTE DETAILS
found to have sig UTI.  pt just came off zpack for a "lung infection" yesterday.  denies f/c abd pain or dysuria but states she sometimes does not know when she has a UTI.  pt is CP free at this time.

## 2022-11-17 NOTE — ED STATDOCS - PATIENT PORTAL LINK FT
You can access the FollowMyHealth Patient Portal offered by [facility] by registering at the following website: [email or web address]. By joining [facility]’s FollowMyHealth portal, you will also be able to view your health information using other applications (apps) compatible with our system.

## 2022-11-17 NOTE — ED STATDOCS - OBJECTIVE STATEMENT
56 y/o F w/ PMHx of HLD on Lipitor, endoscopy, PNA, rheumatoid arthritis presents to ED c/o intermittent chest pain, nausea, vomiting. Pt states she's never experienced this pain before. When she experiences the chest pain, it last for seconds. Pt has family history of heart attack. Last stress test was done a few months ago, which was normal.    PCP: Dr. Flores  Cardio: Dr. Jasmine 56 y/o F w/ PMHx of HLD on Lipitor, endoscopy, PNA, rheumatoid arthritis presents to ED c/o intermittent chest pain.  When she experiences the chest pain, it last for seconds. no SOB.  no n/v diaphoresis.  Pt has family history of heart attack. Last stress test was done a few months ago, which was normal.    PCP: Dr. Flores  Cardio: Dr. Jasmine

## 2022-11-17 NOTE — ED STATDOCS - NSICDXPASTMEDICALHX_GEN_ALL_CORE_FT
PAST MEDICAL HISTORY:  Crohn's disease     H/O osteoporosis     PNA (pneumonia)     Rheumatoid arthritis

## 2022-11-17 NOTE — ED STATDOCS - CLINICAL SUMMARY MEDICAL DECISION MAKING FREE TEXT BOX
intermittent atypical chest pain, labs EKG chest x-ray and monitor and reassess. intermittent atypical chest pain, labs EKG chest x-ray and monitor and reassess.-->see progress note

## 2022-11-17 NOTE — ED STATDOCS - NSFOLLOWUPINSTRUCTIONS_ED_ALL_ED_FT
Follow up with Dr Jasmine tomorrow  Bactrim for antibiotics for UTI  plenty of fluids  Please take probiotics for 2-3 months when finished with antibiotics  Anything worsens or persists, return to ER for further care and evaluation.      Urinary Tract Infection, Adult  ImageA urinary tract infection (UTI) is an infection of any part of the urinary tract, which includes the kidneys, ureters, bladder, and urethra. These organs make, store, and get rid of urine in the body. UTI can be a bladder infection (cystitis) or kidney infection (pyelonephritis).    What are the causes?  This infection may be caused by fungi, viruses, or bacteria. Bacteria are the most common cause of UTIs. This condition can also be caused by repeated incomplete emptying of the bladder during urination.    What increases the risk?  This condition is more likely to develop if:    You ignore your need to urinate or hold urine for long periods of time.  You do not empty your bladder completely during urination.  You wipe back to front after urinating or having a bowel movement, if you are female.  You are uncircumcised, if you are male.  You are constipated.  You have a urinary catheter that stays in place (indwelling).  You have a weak defense (immune) system.  You have a medical condition that affects your bowels, kidneys, or bladder.  You have diabetes.  You take antibiotic medicines frequently or for long periods of time, and the antibiotics no longer work well against certain types of infections (antibiotic resistance).  You take medicines that irritate your urinary tract.  You are exposed to chemicals that irritate your urinary tract.  You are female.    What are the signs or symptoms?  Symptoms of this condition include:    Fever.  Frequent urination or passing small amounts of urine frequently.  Needing to urinate urgently.  Pain or burning with urination.  Urine that smells bad or unusual.  Cloudy urine.  Pain in the lower abdomen or back.  Trouble urinating.  Blood in the urine.  Vomiting or being less hungry than normal.  Diarrhea or abdominal pain.  Vaginal discharge, if you are female.    How is this diagnosed?  This condition is diagnosed with a medical history and physical exam. You will also need to provide a urine sample to test your urine. Other tests may be done, including:    Blood tests.  Sexually transmitted disease (STD) testing.    If you have had more than one UTI, a cystoscopy or imaging studies may be done to determine the cause of the infections.    How is this treated?  Treatment for this condition often includes a combination of two or more of the following:    Antibiotic medicine.  Other medicines to treat less common causes of UTI.  Over-the-counter medicines to treat pain.  Drinking enough water to stay hydrated.    Follow these instructions at home:  Take over-the-counter and prescription medicines only as told by your health care provider.  If you were prescribed an antibiotic, take it as told by your health care provider. Do not stop taking the antibiotic even if you start to feel better.  Avoid alcohol, caffeine, tea, and carbonated beverages. They can irritate your bladder.  Drink enough fluid to keep your urine clear or pale yellow.  Keep all follow-up visits as told by your health care provider. This is important.  ImageMake sure to:    Empty your bladder often and completely. Do not hold urine for long periods of time.  Empty your bladder before and after sex.  Wipe from front to back after a bowel movement if you are female. Use each tissue one time when you wipe.    Contact a health care provider if:  You have back pain.  You have a fever.  You feel nauseous or vomit.  Your symptoms do not get better after 3 days.  Your symptoms go away and then return.  Get help right away if:  You have severe back pain or lower abdominal pain.  You are vomiting and cannot keep down any medicines or water.  This information is not intended to replace advice given to you by your health care provider. Make sure you discuss any questions you have with your health care provider.        Chest Pain    WHAT YOU NEED TO KNOW:    Chest pain can be caused by a range of conditions, from not serious to life-threatening. Chest pain can be a symptom of a digestive problem, such as acid reflux or a stomach ulcer. An anxiety attack or a strong emotion, such as anger, can also cause chest pain. Infection, inflammation, or a fracture in the bones or cartilage in your chest can cause pain or discomfort. Sometimes chest pain or pressure is caused by poor blood flow to your heart (angina). Chest pain may also be caused by life-threatening conditions such as a heart attack or blood clot in your lungs.     DISCHARGE INSTRUCTIONS:    Call 911 if:     You have any of the following signs of a heart attack:   Squeezing, pressure, or pain in your chest       and any of the following:   Discomfort or pain in your back, neck, jaw, stomach, or arm       Shortness of breath      Nausea or vomiting      Lightheadedness or a sudden cold sweat        Return to the emergency department if:     You have chest discomfort that gets worse, even with medicine.      You cough or vomit blood.       Your bowel movements are black or bloody.       You cannot stop vomiting, or it hurts to swallow.     Contact your healthcare provider if:     You have questions or concerns about your condition or care.        Medicines:     Medicines may be given to treat the cause of your chest pain. Examples include pain medicine, anxiety medicine, or medicines to increase blood flow to your heart.       Do not take certain medicines without asking your healthcare provider first. These include NSAIDs, herbal or vitamin supplements, or hormones (estrogen or progestin).       Take your medicine as directed. Contact your healthcare provider if you think your medicine is not helping or if you have side effects. Tell him or her if you are allergic to any medicine. Keep a list of the medicines, vitamins, and herbs you take. Include the amounts, and when and why you take them. Bring the list or the pill bottles to follow-up visits. Carry your medicine list with you in case of an emergency.    Follow up with your healthcare provider within 72 hours, or as directed: You may need to return for more tests to find the cause of your chest pain. You may be referred to a specialist, such as a cardiologist or gastroenterologist. Write down your questions so you remember to ask them during your visits.     Healthy living tips: The following are general healthy guidelines. If your chest pain is caused by a heart problem, your healthcare provider will give you specific guidelines to follow.    Do not smoke. Nicotine and other chemicals in cigarettes and cigars can cause lung and heart damage. Ask your healthcare provider for information if you currently smoke and need help to quit. E-cigarettes or smokeless tobacco still contain nicotine. Talk to your healthcare provider before you use these products.       Eat a variety of healthy, low-fat, low-salt foods. Healthy foods include fruits, vegetables, whole-grain breads, low-fat dairy products, beans, lean meats, and fish. Ask for more information about a heart healthy diet.      Drink plenty of water every day. Your body is made of mostly water. Water helps your body to control your temperature and blood pressure. Ask your healthcare provider how much water you should drink every day.      Ask about activity. Your healthcare provider will tell you which activities to limit or avoid. Ask when you can drive, return to work, and have sex. Ask about the best exercise plan for you.      Maintain a healthy weight. Ask your healthcare provider how much you should weigh. Ask him or her to help you create a weight loss plan if you are overweight.       Get the flu and pneumonia vaccines. All adults should get the influenza (flu) vaccine. Get it every year as soon as it becomes available. The pneumococcal vaccine is given to adults aged 65 years or older. The vaccine is given every 5 years to prevent pneumococcal disease, such as pneumonia.    If you have a stent:     Carry your stent card with you at all times.       Let all healthcare providers know that you have a stent.

## 2022-11-18 VITALS
TEMPERATURE: 98 F | HEART RATE: 66 BPM | SYSTOLIC BLOOD PRESSURE: 94 MMHG | DIASTOLIC BLOOD PRESSURE: 68 MMHG | RESPIRATION RATE: 18 BRPM | OXYGEN SATURATION: 97 %

## 2022-11-18 RX ORDER — CEFTRIAXONE 500 MG/1
1000 INJECTION, POWDER, FOR SOLUTION INTRAMUSCULAR; INTRAVENOUS ONCE
Refills: 0 | Status: COMPLETED | OUTPATIENT
Start: 2022-11-18 | End: 2022-11-18

## 2022-11-18 RX ORDER — CEFTRIAXONE 500 MG/1
1000 INJECTION, POWDER, FOR SOLUTION INTRAMUSCULAR; INTRAVENOUS ONCE
Refills: 0 | Status: DISCONTINUED | OUTPATIENT
Start: 2022-11-18 | End: 2022-11-18

## 2022-11-18 RX ADMIN — CEFTRIAXONE 1000 MILLIGRAM(S): 500 INJECTION, POWDER, FOR SOLUTION INTRAMUSCULAR; INTRAVENOUS at 01:31

## 2022-11-23 ENCOUNTER — APPOINTMENT (OUTPATIENT)
Dept: ORTHOPEDIC SURGERY | Facility: CLINIC | Age: 57
End: 2022-11-23

## 2022-11-23 DIAGNOSIS — M17.12 UNILATERAL PRIMARY OSTEOARTHRITIS, LEFT KNEE: ICD-10-CM

## 2022-11-23 PROCEDURE — 20610 DRAIN/INJ JOINT/BURSA W/O US: CPT | Mod: RT

## 2022-11-28 PROBLEM — M17.12 PRIMARY OSTEOARTHRITIS OF LEFT KNEE: Status: ACTIVE | Noted: 2019-08-29

## 2022-11-29 NOTE — REASON FOR VISIT
[FreeTextEntry2] : the second Euflexxa injection into the left knee and first injection into the right knee

## 2022-11-29 NOTE — PHYSICAL EXAM
[de-identified] : Left Knee: \par Range of Motion in Degrees	\par 	  Claimant:	Normal:	\par Flexion Active	 135 	 135-degrees	\par Flexion Passive	 135	 135-degrees	\par Extension Active	 0-5	 0-5-degrees	\par Extension Passive	 0-5	 0-5-degrees	\par \par No weakness to flexion/extension. No evidence of instability in the AP plane or varus or valgus stress. Negative Lachman. Negative pivot shift. Negative anterior drawer test. Negative posterior drawer test. Negative Re. Negative Apley grind. No medial or lateral joint line tenderness. Positive tenderness over the medial and lateral facet of the patella. Positive patellofemoral crepitations. No lateral tilting patella. No patella apprehension. Positive crepitation in the medial and lateral femoral condyle. No proximal or distal swelling, edema or tenderness. No gross motor or sensory deficits. Mild intra-articular swelling. 2+ DP and PT pulses. No varus or valgus malalignment. Skin is intact. No rashes, scars or lesions. \par \par Right Knee: \par Range of Motion in Degrees	\par 	  Claimant:	Normal:	\par Flexion Active	 135 	 135-degrees	\par Flexion Passive	 135	 135-degrees	\par Extension Active	 0-5	 0-5-degrees	\par Extension Passive	 0-5	 0-5-degrees	\par \par No weakness to flexion/extension. No evidence of instability in the AP plane or varus or valgus stress. Negative Lachman. Negative pivot shift. Negative anterior drawer test. Negative posterior drawer test. Negative Re. Negative Apley grind. No medial or lateral joint line tenderness. Positive tenderness over the medial and lateral facet of the patella. Positive patellofemoral crepitations. No lateral tilting patella. No patella apprehension. Positive crepitation in the medial and lateral femoral condyle. No proximal or distal swelling, edema or tenderness. No gross motor or sensory deficits. Mild intra-articular swelling. 2+ DP and PT pulses. No varus or valgus malalignment. Skin is intact. No rashes, scars or lesions.

## 2022-11-29 NOTE — ADDENDUM
[FreeTextEntry1] : This note was written by Judith Sullivan on 11/22/2022 acting as a scribe for HARVEY FLANNERY III, MD

## 2022-11-30 ENCOUNTER — APPOINTMENT (OUTPATIENT)
Dept: ORTHOPEDIC SURGERY | Facility: CLINIC | Age: 57
End: 2022-11-30

## 2022-11-30 PROCEDURE — 20610 DRAIN/INJ JOINT/BURSA W/O US: CPT | Mod: RT

## 2022-12-01 NOTE — PHYSICAL EXAM
[de-identified] : Right Knee:\par Knee: Range of Motion in Degrees	\par 	                  Claimant:	Normal:	\par Flexion Active	  135 	                135-degrees	\par Flexion Passive	  135	                135-degrees	\par Extension Active	  0-5	                0-5-degrees	\par Extension Passive	  0-5	                0-5-degrees	\par \par No weakness to flexion/extension.  No evidence of instability in the AP plane or varus or valgus stress.  Negative  Lachman.  Negative pivot shift.  Negative anterior drawer test.  Negative posterior drawer test.  Negative Re.  Negative Apley grind.  No medial or lateral joint line tenderness.  Positive tenderness over the medial and lateral facet of the patella.  Positive patellofemoral crepitations.  No lateral tilting patella.  No patella apprehension.  Positive crepitation in the medial and lateral femoral condyle.  No proximal or distal swelling, edema or tenderness.  No gross motor or sensory deficits.  Mild intra-articular swelling.  2+ DP and PT pulses.  No varus or valgus malalignment.  Skin is intact.  No rashes, scars or lesions.  \par \par

## 2022-12-01 NOTE — ADDENDUM
[FreeTextEntry1] : This note was written by Verna Garcia on 12/01/2022 acting as scribe for Tk Vázquez III, MD

## 2022-12-01 NOTE — PROCEDURE
[de-identified] : Consent:\par The risks and benefits of the procedure were discussed with the patient in detail.  Upon verbal consent of the patient, we proceeded with the Euflexxa injection as noted below.  \par \par Indications: Osteoarthritis, right knee\par : [employer]\par NDC#: [address]-2\par Lot#:   [id number]\par Expiration:  09/05/23\par \par Procedure:\par Under sterile conditions, the patient underwent a Euflexxa injection to the right knee of 20 mg sodium Hyaluronate, 17 mg sodium chloride, 1.12 mg disodium hydrogen phosphate dodecahydrate, .10 mg sodium dihydrogen phosphate dehydrate in a 2 mL syringe without any complications.  The patient tolerated this well. \par \par \par Plan: \par I have recommended ice and elevation.  The patient will be reassessed in six to eight weeks for the osteoarthritis of the right knee.  \par

## 2022-12-05 ENCOUNTER — FORM ENCOUNTER (OUTPATIENT)
Age: 57
End: 2022-12-05

## 2022-12-05 NOTE — ADDENDUM
[FreeTextEntry1] : This note was written by Mendez Gonsalves on 11/28/2022, acting as a scribe for Tk Vázquez III, MD

## 2022-12-05 NOTE — PHYSICAL EXAM
[de-identified] : Right Knee: Range of Motion in Degrees	\par 	                  Claimant:	Normal:	\par Flexion Active	  135 	                135-degrees	\par Flexion Passive	  135	                135-degrees	\par Extension Active	  0-5	                0-5-degrees	\par Extension Passive	  0-5	                0-5-degrees	\par \par No weakness to flexion/extension.  No evidence of instability in the AP plane or varus or valgus stress.  Negative  Lachman.  Negative pivot shift.  Negative anterior drawer test.  Negative posterior drawer test.  Negative Re.  Negative Apley grind.  No medial or lateral joint line tenderness.  Positive tenderness over the medial and lateral facet of the patella.  Positive patellofemoral crepitations.  No lateral tilting patella.  No patella apprehension.  Positive crepitation in the medial and lateral femoral condyle.  No proximal or distal swelling, edema or tenderness.  No gross motor or sensory deficits.  Mild intra-articular swelling.  2+ DP and PT pulses.  No varus or valgus malalignment.  Skin is intact.  No rashes, scars or lesions.  \par \par Left Knee: Range of Motion in Degrees	\par 	                  Claimant:	Normal:	\par Flexion Active	  135 	                135-degrees	\par Flexion Passive	  135	                135-degrees	\par Extension Active	  0-5	                0-5-degrees	\par Extension Passive	  0-5	                0-5-degrees	\par \par No weakness to flexion/extension.  No evidence of instability in the AP plane or varus or valgus stress.  Negative  Lachman.  Negative pivot shift.  Negative anterior drawer test.  Negative posterior drawer test.  Negative Re.  Negative Apley grind.  No medial or lateral joint line tenderness.  Positive tenderness over the medial and lateral facet of the patella.  Positive patellofemoral crepitations.  No lateral tilting patella.  No patella apprehension.  Positive crepitation in the medial and lateral femoral condyle.  No proximal or distal swelling, edema or tenderness.  No gross motor or sensory deficits.  Mild intra-articular swelling.  2+ DP and PT pulses.  No varus or valgus malalignment.  Skin is intact.  No rashes, scars or lesions. \par

## 2022-12-05 NOTE — PROCEDURE
[de-identified] : \par Indications:\par Osteoarthritis of the right knee\par Osteoarthritis of the left knee\par \par Consent:\par The risks and benefits of the procedure were discussed with the patient in detail.  Upon verbal consent of the patient, we proceeded with the Euflexxa injections as noted below.  \par \par Description of Procedure:\par Under sterile conditions, the patient underwent a Euflexxa injection to the right and left knee of 20 mg sodium Hyaluronate, 17 mg sodium chloride, 1.12 mg disodium hydrogen phosphate dodecahydrate, .10 mg sodium dihydrogen phosphate dehydrate in a 2 mL syringe without any complications.  The patient tolerated this well. \par \par :  [employer]\par NDC#:  [address]-9\par Lot#:    [id number]\par Expiration Date:  09/05/2023\par \par :  [employer]\par NDC#:  [address]-8\par Lot#:    [id number]\par Expiration Date:  09/19/2023\par \par Plan: \par I have recommended ice and elevation.  The patient will be reassessed in 6-8 weeks for osteoarthritis of the left knee and in one week for the next injection to the right knee.  \par \par

## 2022-12-05 NOTE — REASON FOR VISIT
[FreeTextEntry2] : the third Euflexxa injection to her left knee and second injection to her right knee

## 2023-01-26 ENCOUNTER — APPOINTMENT (OUTPATIENT)
Dept: ORTHOPEDIC SURGERY | Facility: CLINIC | Age: 58
End: 2023-01-26
Payer: COMMERCIAL

## 2023-01-26 VITALS
BODY MASS INDEX: 36.96 KG/M2 | WEIGHT: 230 LBS | SYSTOLIC BLOOD PRESSURE: 117 MMHG | HEIGHT: 66 IN | HEART RATE: 79 BPM | DIASTOLIC BLOOD PRESSURE: 77 MMHG

## 2023-01-26 DIAGNOSIS — M70.62 TROCHANTERIC BURSITIS, LEFT HIP: ICD-10-CM

## 2023-01-26 DIAGNOSIS — M17.11 UNILATERAL PRIMARY OSTEOARTHRITIS, RIGHT KNEE: ICD-10-CM

## 2023-01-26 DIAGNOSIS — M70.61 TROCHANTERIC BURSITIS, RIGHT HIP: ICD-10-CM

## 2023-01-26 PROCEDURE — 20610 DRAIN/INJ JOINT/BURSA W/O US: CPT | Mod: RT

## 2023-01-26 PROCEDURE — 99213 OFFICE O/P EST LOW 20 MIN: CPT | Mod: 25

## 2023-01-30 NOTE — PHYSICAL EXAM
[de-identified] : Right Hip: Range of Motion in Degrees:\par 	                                 Claimant:	          Normal:	\par Flexion (Active) 	                 120 	          120-degrees	\par Flexion (Passive)	                 120	          120-degrees	\par Extension (Active)	                 -30	          -30-degrees	\par Extension (Passive)	 -30	          -30-degrees	\par Abduction (Active)	                45-50	          13-80-gbvybbt	\par Abduction (Passive)	45-50	          38-15-yeezpyz	\par Adduction (Active)                	20-30	          01-29-xqmcovg	\par Adduction (Passive)	20-30	          91-72-vsuzmoe	\par Internal Rotation (Active) 	35	          35-degrees	\par Internal Rotation (Passive)	35	          35-degrees	\par External Rotation (Active)	45	          45-degrees	\par External Rotation (Passive)	45	          45-degrees	\par \par No tenderness with internal or external rotation or axial load.  Point tenderness over the greater trochanter with pain with resisted abduction.  Negative Trendelenburg.  No weakness to flexion, extension, abduction or adduction.  No evidence of instability.  No motor or sensory deficits.  2+ DP and PT pulses.  Skin is intact.  No scars, rashes or lesions.  \par  \par Left Hip: Range of Motion in Degrees:\par 	                                 Claimant:	          Normal:	\par Flexion (Active) 	                 120 	          120-degrees	\par Flexion (Passive)	                 120	          120-degrees	\par Extension (Active)	                 -30	          -30-degrees	\par Extension (Passive)	 -30	          -30-degrees	\par Abduction (Active)	                45-50	          82-22-mwzcrzh	\par Abduction (Passive)	45-50	          62-56-fvntexa	\par Adduction (Active)                	20-30	          12-44-loelqdb	\par Adduction (Passive)	20-30	          88-08-eeskroi	\par Internal Rotation (Active) 	35	          35-degrees	\par Internal Rotation (Passive)	35	          35-degrees	\par External Rotation (Active)	45	          45-degrees	\par External Rotation (Passive)	45	          45-degrees	\par \par No tenderness with internal or external rotation or axial load.  Point tenderness over the greater trochanter with pain with resisted abduction.  Negative Trendelenburg.  No weakness to flexion, extension, abduction or adduction.  No evidence of instability.  No motor or sensory deficits.  2+ DP and PT pulses.  Skin is intact.  No scars, rashes or lesions.  \par  \par Right Knee: Range of Motion in Degrees	\par 	                  Claimant:	Normal:	\par Flexion Active	  135 	                135-degrees	\par Flexion Passive	  135	                135-degrees	\par Extension Active	  0-5	                0-5-degrees	\par Extension Passive	  0-5	                0-5-degrees	\par \par No weakness to flexion/extension. No evidence of instability in the AP plane or varus or valgus stress.  Negative  Lachman.  Negative pivot shift.  Negative anterior drawer test.  Negative posterior drawer test.  Negative Re.  Negative Apley grind.  No medial or lateral joint line tenderness.  Positive tenderness over the medial and lateral facet of the patella.  Positive patellofemoral crepitations.  No lateral tilting patella.  No patella apprehension.  Positive crepitation in the medial and lateral femoral condyle.  No proximal or distal swelling, edema or tenderness.  No gross motor or sensory deficits. Mild intra-articular swelling.  2+ DP and PT pulses. No varus or valgus malalignment.  Skin is intact.  No rashes, scars or lesions. \par  [de-identified] : Gait and Station:  Ambulating with a slightly antalgic to antalgic gait.  Normal Station.  [de-identified] : Appearance:  Well developed, well-nourished female in no acute distress.\par

## 2023-01-30 NOTE — PROCEDURE
[de-identified] : \par Indication:\par Trochanteric bursitis of right hip\par \par Consent: \par At this time, I have recommended an injection to the right hip.  The risks and benefits of the procedure were discussed with the patient in detail.  Upon verbal consent of the patient, we proceeded with the injection as noted below.  \par \par Description of Procedure:  \par After a sterile prep, the patient underwent an injection of approximately 9 mL of 1% Lidocaine 10 mg/mL without epinephrine and 1 mL of Kenalog (40 mg/mL) into the right hip with some immediate relief.  The patient tolerated the procedure well.  There were no complications. \par \par : [employer]par Drug Name: Triamcinolone Acetonide Injectable Suspension USP\par NDC#: 0143-9577-10\par Lot#:   [id number]\par Expiration Date: 01/2024\par \par \par

## 2023-01-30 NOTE — DISCUSSION/SUMMARY
[de-identified] : At this time, I recommended ice and elevation for the right hip trochanteric bursitis.  She will be reassessed in 1 week for an injection to the left hip.  The patient has done well from viscosupplementation for the right knee osteoarthritis. She was instructed on home therapeutic modalities.

## 2023-01-30 NOTE — HISTORY OF PRESENT ILLNESS
[de-identified] : The patient comes in today for her bilateral hips and right knee.  She states her right knee is feeling great, but she is having persistent complaints of pain to both hips.\par

## 2023-02-09 ENCOUNTER — APPOINTMENT (OUTPATIENT)
Dept: ORTHOPEDIC SURGERY | Facility: CLINIC | Age: 58
End: 2023-02-09
Payer: COMMERCIAL

## 2023-02-09 VITALS
DIASTOLIC BLOOD PRESSURE: 75 MMHG | HEART RATE: 61 BPM | WEIGHT: 230 LBS | BODY MASS INDEX: 36.96 KG/M2 | HEIGHT: 66 IN | SYSTOLIC BLOOD PRESSURE: 108 MMHG

## 2023-02-09 DIAGNOSIS — M70.62 TROCHANTERIC BURSITIS, LEFT HIP: ICD-10-CM

## 2023-02-09 PROCEDURE — 20610 DRAIN/INJ JOINT/BURSA W/O US: CPT | Mod: LT

## 2023-02-15 PROBLEM — M70.62 TROCHANTERIC BURSITIS OF LEFT HIP: Status: ACTIVE | Noted: 2019-08-14

## 2023-02-15 NOTE — PHYSICAL EXAM
[de-identified] : Left Hip: Range of Motion in Degrees:\par 	                                 Claimant:	          Normal:	\par Flexion (Active) 	                 120 	          120-degrees	\par Flexion (Passive)	                 120	          120-degrees	\par Extension (Active)	                 -30	          -30-degrees	\par Extension (Passive)	 -30	          -30-degrees	\par Abduction (Active)	                45-50	          28-15-jqwkwvd	\par Abduction (Passive)	45-50	          00-64-pdaomcz	\par Adduction (Active)                	20-30	          44-64-zmukznr	\par Adduction (Passive)	20-30	          68-31-kpcywsb	\par Internal Rotation (Active) 	35	          35-degrees	\par Internal Rotation (Passive)	35	          35-degrees	\par External Rotation (Active)	45	          45-degrees	\par External Rotation (Passive)	45	          45-degrees	\par \par No tenderness with internal or external rotation or axial load.  Point tenderness over the greater trochanter with pain with resisted abduction.  Negative Trendelenburg.  No weakness to flexion, extension, abduction or adduction.  No evidence of instability.  No motor or sensory deficits.  2+ DP and PT pulses.  Skin is intact.  No scars, rashes or lesions.  \par   [de-identified] : Gait and Station:  Ambulating with a slightly antalgic to antalgic gait.  Normal Station.  [de-identified] : Appearance:  Well developed, well-nourished female in no acute distress.\par

## 2023-02-15 NOTE — ADDENDUM
[FreeTextEntry1] : This note was written by Mendez Gonsalves on 02/15/2023, acting as a scribe for Tk Vázquez III, MD

## 2023-02-15 NOTE — HISTORY OF PRESENT ILLNESS
[de-identified] : The patient comes in today for her bilateral hips.  She states the right hip is feeling great, but she is having persistent complaints of pain to the left hip.\par

## 2023-02-15 NOTE — DISCUSSION/SUMMARY
[de-identified] : At this time, I recommended ice and elevation for the left hip trochanteric bursitis.  She was instructed on home therapeutic modalities and she will follow up in 6-8 weeks if symptoms warrant.\par

## 2023-02-15 NOTE — PROCEDURE
[de-identified] : \par Indication:\par Trochanteric bursitis of left hip\par \par Consent: \par At this time, I have recommended an injection to the left hip.  The risks and benefits of the procedure were discussed with the patient in detail.  Upon verbal consent of the patient, we proceeded with the injection as noted below.  \par \par Description of Procedure:  \par After a sterile prep, the patient underwent an injection of approximately 9 mL of 1% Lidocaine 10 mg/mL without epinephrine and 1 mL of Kenalog (40 mg/mL) into the left hip.  The patient tolerated the procedure well.  There were no complications. \par \par : [employer]par Drug Name: Triamcinolone Acetonide Injectable Suspension USP\par NDC#: 0143-9577-10\par Lot#:   [id number]\par Expiration Date: 01/2024\par \par \par

## 2023-03-02 ENCOUNTER — NON-APPOINTMENT (OUTPATIENT)
Age: 58
End: 2023-03-02

## 2023-03-26 ENCOUNTER — NON-APPOINTMENT (OUTPATIENT)
Age: 58
End: 2023-03-26

## 2023-04-06 ENCOUNTER — TRANSCRIPTION ENCOUNTER (OUTPATIENT)
Age: 58
End: 2023-04-06

## 2023-04-12 NOTE — PATIENT PROFILE ADULT. - AS SC BRADEN ACTIVITY
(3) walks occasionally Xeljanz Counseling: I discussed with the patient the risks of Xeljanz therapy including increased risk of infection, liver issues, headache, diarrhea, or cold symptoms. Live vaccines should be avoided. They were instructed to call if they have any problems.

## 2023-04-21 ENCOUNTER — APPOINTMENT (OUTPATIENT)
Dept: FAMILY MEDICINE | Facility: CLINIC | Age: 58
End: 2023-04-21
Payer: COMMERCIAL

## 2023-04-21 VITALS
WEIGHT: 233 LBS | HEIGHT: 66 IN | HEART RATE: 82 BPM | OXYGEN SATURATION: 99 % | TEMPERATURE: 98.1 F | DIASTOLIC BLOOD PRESSURE: 74 MMHG | BODY MASS INDEX: 37.45 KG/M2 | SYSTOLIC BLOOD PRESSURE: 128 MMHG

## 2023-04-21 DIAGNOSIS — Z98.84 BARIATRIC SURGERY STATUS: ICD-10-CM

## 2023-04-21 DIAGNOSIS — M81.0 AGE-RELATED OSTEOPOROSIS W/OUT CURRENT PATHOLOGICAL FRACTURE: ICD-10-CM

## 2023-04-21 DIAGNOSIS — K22.70 BARRETT'S ESOPHAGUS W/OUT DYSPLASIA: ICD-10-CM

## 2023-04-21 PROCEDURE — 99215 OFFICE O/P EST HI 40 MIN: CPT | Mod: 25

## 2023-04-21 PROCEDURE — 36415 COLL VENOUS BLD VENIPUNCTURE: CPT

## 2023-04-21 PROCEDURE — G0444 DEPRESSION SCREEN ANNUAL: CPT | Mod: 59

## 2023-04-21 RX ORDER — ZOLEDRONIC ACID 5 MG/100ML
INJECTION, SOLUTION INTRAVENOUS
Refills: 0 | Status: ACTIVE | COMMUNITY

## 2023-04-21 NOTE — REVIEW OF SYSTEMS
[Recent Change In Weight] : ~T recent weight change [Joint Pain] : joint pain [Joint Stiffness] : joint stiffness [Negative] : Heme/Lymph [Fever] : no fever [Chills] : no chills [Fatigue] : no fatigue [FreeTextEntry2] : wgt gain over past few yrs and struggles to lose wgt (lost a little but expected should have lost more) despite  eating, smaller portions [FreeTextEntry7] : GERD/dysphagia/Engel's [FreeTextEntry9] : OA and RA related joint pain and stiffness

## 2023-04-21 NOTE — HISTORY OF PRESENT ILLNESS
[FreeTextEntry1] : GIO MENDIETA is a 57 year old female here for a follow up visit.\par  [de-identified] : Jahaira has a history of IFG, high cholesterol, RA, GERD/Engel s metaplasia, Crohn's/ileitis, asthma, anxiety, vitamin D insufficiency, current tobacco use and osteoporosis. \par \par She is followed by Dr. Aston Saxena for her RA and is UTD on visits with him.  Is on Humira for her RA . Also Started Reclast (first dose was 4/20/23) \par \hannah Is UTD on f/u with Arethamariel Young/Dr. Mayes re dysphagia sxs/Engel's . Had colonoscopy and EGD in Fall 2022. Colonoscopy was benign/no polyps or colitis; EGD showed extensive intestinal metaplasia in esophagus c/w Engel s so was advised needs f/u EGD at 2 yr corey (Fall 2023). \par \par Dr. Freeman is her pulm (asthma), Mackenzie Maurer/Dr. Chacko is card team and she is UTD on f/u eval and testing. \par \hannah Is still smoking. Uses vape pen and uses lowest nicotine option pen and is working to decrease over time\par \hannah Struggling to lose wgt despite cutting back on sugar and WF carbs and watching portions. Finds it hard to exercise due to everything hurts from carrying the extra weight . Worried about health consequences of her BMI and would like to try an injectable wgt loss med to help get started with wgt loss. Not interested in bariatric surg\par \hannah Had vit D level checked with rheum yest and was low so will be starting vit D supplement once weekly for a short time

## 2023-04-21 NOTE — PLAN
[FreeTextEntry1] : Continue all medications as prescribed. Check labs as above; had metabolic panel (but was not fasting and glu was marked as elevated) and vit D level with rheum yesterday. Will adjust any medications based upon lab results.\par \par BP goal is <=135/85 on average on home checks. Advised to let us know if BPs are above goal on home checks. \par \par LDL goal <=100 ideally. Reviewed risks/benefits of statin med. Recommended excellent hydration +/- co Q10 (100-400 mg daily) to decrease risk for statin related myalgias. \par \par She would like to consider trial of an injectable wgt loss med. We will check labs today. She will check insurance formulary re if Wegovy or Mounjaro are preferred. Sh e states she hopes to only be on med relatively short period of time (eg 6ish months) to jump start wgt loss and help bones/joints/muscles feel better enough to move more. \par  \par Revd pros/cons/indications for injectable wgt loss meds such as Wegovy and Mounjaro.  Revd possible known (some studies indicate increased risk for thyroid tumors and cancers, and she has FH thyroid cancer) and also revd likely there are unknown longer term SE of which we are unaware. She needs to carefully consider whether the benefit she will get from these meds will outweigh the possible known/unknown risks. I would favor her not using these meds at this point/er current BMI and working on eating more plant based/Mediterranean style diet and move more/sit less/keep as physically active as possible to try to achieve wgt stability or some wgt loss. If she still prefers to start med she can research which med is preferred on her plan (for wgt loss, not DM as she is not diabetic) and let me know and I will send in Rx. \par \par Offered referral to Medical Weight Mgmt team and/or dietician for further assistance in her wgt loss efforts. She defers for now but we Can consider referral to med wgt mgmt team if needs med longer term  \par \par Discussed clean eating (eg Mediterranean style eating plan) and regular exercise/staying as physically active as possible. Include balance exercises and strength training and core strengthening exercises for bone health and to decrease risk for falls. \par \par Quit smoking recommended and revd risks of continued smoking and benefits of quitting and options for quitting incl nicotine replacement and Bupropion and Chantix and acupuncture etc. \par \par Revd diagnosis of osteoporosis, increased risk for fractures and related consequences and Rx medication options to treat including usually oral bisphosphonates (eg Alendronate, Risedronate) first line and r/b/se of these incl GI SE and increased risk for jaw osteonecrosis and atypical femur fractures mainly seen in patients with prolonged/long term use. She will d/w rheum specialist for recommendation and can also d/w GI and if Dr. Mayes prefers that she avoid oral bisphosphonate meds (as I think he might given her known signif Engel s metaplasia) then she can d/w rheum about consideration of other treatments (eg Prolia, Forteo, Reclast). Reviewed given her relatively young age at dx of osteoporosis and h/o periodic need for steroid use for rheum/pulm issues I strongly recommend she start some Rx med for OP and also she needs to add strength training/regular exercise. Recommend repeat DEXA at 3 year corey. \par \par Recommended calcium 1004-3896 mg daily ideally mainly or fully from food sources +/- supplement if needed, vit D 7008-0240 IU or whatever dose is needed to get D into 30-80 range. Recommended regular weight bearing exercise as well as strength training exercise 3 or more times per week and balance exercises regularly. \par \par Cont f/u with rheum and GI and card and pulm as recommended by them. \par \par Reviewed importance of good self care (e.g. meditation, yoga, adequate rest, regular exercise, magnesium, clean eating, etc.).\par \par Schedule CPE and RPA visit (chol, IFG etc) and repeat fasting labs in about 6 months. RPA visit with me or Kali in 3 months re wgt loss med and rpt labs \par \par Face-to-face time spent with patient, over half in discussion of the above diagnoses and treatment plan: 40 minutes.

## 2023-04-21 NOTE — PHYSICAL EXAM
[No Acute Distress] : no acute distress [Well Developed] : well developed [Well-Appearing] : well-appearing [Normal Sclera/Conjunctiva] : normal sclera/conjunctiva [EOMI] : extraocular movements intact [Normal Outer Ear/Nose] : the outer ears and nose were normal in appearance [No JVD] : no jugular venous distention [No Lymphadenopathy] : no lymphadenopathy [Supple] : supple [Thyroid Normal, No Nodules] : the thyroid was normal and there were no nodules present [No Respiratory Distress] : no respiratory distress  [No Accessory Muscle Use] : no accessory muscle use [Clear to Auscultation] : lungs were clear to auscultation bilaterally [Normal Rate] : normal rate  [Regular Rhythm] : with a regular rhythm [No Murmur] : no murmur heard [Normal S1, S2] : normal S1 and S2 [No Carotid Bruits] : no carotid bruits [No Varicosities] : no varicosities [Pedal Pulses Present] : the pedal pulses are present [No Edema] : there was no peripheral edema [No Extremity Clubbing/Cyanosis] : no extremity clubbing/cyanosis [Soft] : abdomen soft [Non Tender] : non-tender [Non-distended] : non-distended [No Masses] : no abdominal mass palpated [No HSM] : no HSM [Normal Bowel Sounds] : normal bowel sounds [No Joint Swelling] : no joint swelling [Grossly Normal Strength/Tone] : grossly normal strength/tone [No Rash] : no rash [Coordination Grossly Intact] : coordination grossly intact [No Focal Deficits] : no focal deficits [Normal Gait] : normal gait [Normal Affect] : the affect was normal [Normal Insight/Judgement] : insight and judgment were intact [de-identified] : +morbid obesity BMI 37 [de-identified] : no s/sxs acute synovitis on labs

## 2023-04-21 NOTE — ASSESSMENT
[FreeTextEntry1] : JAHAIRA MENDIETA is a 57 year old female here for follow up on medical issues as noted above.\par \par Jahaira is here to follow up on IFG, high cholesterol, RA, Crohn's/ileitis, asthma, anxiety, vitamin D insufficiency, and osteoporosis. BMI 37 and she is requesting to try injectable med for wgt loss

## 2023-04-21 NOTE — HEALTH RISK ASSESSMENT
[0] : 2) Feeling down, depressed, or hopeless: Not at all (0) [PHQ-2 Negative - No further assessment needed] : PHQ-2 Negative - No further assessment needed [Current] : Current [[id number]] : 0

## 2023-04-23 ENCOUNTER — TRANSCRIPTION ENCOUNTER (OUTPATIENT)
Age: 58
End: 2023-04-23

## 2023-04-23 LAB
ALBUMIN SERPL ELPH-MCNC: 4.2 G/DL
ALP BLD-CCNC: 98 U/L
ALT SERPL-CCNC: 16 U/L
ANION GAP SERPL CALC-SCNC: 12 MMOL/L
AST SERPL-CCNC: 14 U/L
BASOPHILS # BLD AUTO: 0.08 K/UL
BASOPHILS NFR BLD AUTO: 1.1 %
BILIRUB SERPL-MCNC: 0.4 MG/DL
BUN SERPL-MCNC: 22 MG/DL
CALCIUM SERPL-MCNC: 9.3 MG/DL
CHLORIDE SERPL-SCNC: 106 MMOL/L
CHOLEST SERPL-MCNC: 162 MG/DL
CO2 SERPL-SCNC: 24 MMOL/L
CREAT SERPL-MCNC: 0.7 MG/DL
EGFR: 101 ML/MIN/1.73M2
EOSINOPHIL # BLD AUTO: 0.21 K/UL
EOSINOPHIL NFR BLD AUTO: 2.9 %
ESTIMATED AVERAGE GLUCOSE: 114 MG/DL
GLUCOSE SERPL-MCNC: 94 MG/DL
HBA1C MFR BLD HPLC: 5.6 %
HCT VFR BLD CALC: 42.6 %
HDLC SERPL-MCNC: 70 MG/DL
HGB BLD-MCNC: 13.3 G/DL
IMM GRANULOCYTES NFR BLD AUTO: 0.1 %
LDLC SERPL CALC-MCNC: 79 MG/DL
LYMPHOCYTES # BLD AUTO: 0.86 K/UL
LYMPHOCYTES NFR BLD AUTO: 11.7 %
MAN DIFF?: NORMAL
MCHC RBC-ENTMCNC: 29.2 PG
MCHC RBC-ENTMCNC: 31.2 GM/DL
MCV RBC AUTO: 93.6 FL
MONOCYTES # BLD AUTO: 0.36 K/UL
MONOCYTES NFR BLD AUTO: 4.9 %
NEUTROPHILS # BLD AUTO: 5.8 K/UL
NEUTROPHILS NFR BLD AUTO: 79.3 %
NONHDLC SERPL-MCNC: 92 MG/DL
PLATELET # BLD AUTO: 348 K/UL
POTASSIUM SERPL-SCNC: 4.6 MMOL/L
PROT SERPL-MCNC: 6.9 G/DL
RBC # BLD: 4.55 M/UL
RBC # FLD: 13.4 %
SODIUM SERPL-SCNC: 142 MMOL/L
TRIGL SERPL-MCNC: 62 MG/DL
WBC # FLD AUTO: 7.32 K/UL

## 2023-05-30 ENCOUNTER — FORM ENCOUNTER (OUTPATIENT)
Age: 58
End: 2023-05-30

## 2023-05-30 ENCOUNTER — NON-APPOINTMENT (OUTPATIENT)
Age: 58
End: 2023-05-30

## 2023-06-02 ENCOUNTER — TRANSCRIPTION ENCOUNTER (OUTPATIENT)
Age: 58
End: 2023-06-02

## 2023-06-05 ENCOUNTER — FORM ENCOUNTER (OUTPATIENT)
Age: 58
End: 2023-06-05

## 2023-06-30 ENCOUNTER — TRANSCRIPTION ENCOUNTER (OUTPATIENT)
Age: 58
End: 2023-06-30

## 2023-08-08 RX ORDER — TIRZEPATIDE 2.5 MG/.5ML
2.5 INJECTION, SOLUTION SUBCUTANEOUS
Qty: 2 | Refills: 0 | Status: DISCONTINUED | COMMUNITY
Start: 2023-05-23 | End: 2023-06-21

## 2023-08-28 ENCOUNTER — RX RENEWAL (OUTPATIENT)
Age: 58
End: 2023-08-28

## 2023-09-06 ENCOUNTER — NON-APPOINTMENT (OUTPATIENT)
Age: 58
End: 2023-09-06

## 2023-09-15 ENCOUNTER — APPOINTMENT (OUTPATIENT)
Dept: FAMILY MEDICINE | Facility: CLINIC | Age: 58
End: 2023-09-15
Payer: COMMERCIAL

## 2023-09-15 VITALS — SYSTOLIC BLOOD PRESSURE: 124 MMHG | DIASTOLIC BLOOD PRESSURE: 72 MMHG

## 2023-09-15 VITALS
HEIGHT: 66 IN | TEMPERATURE: 97 F | HEART RATE: 91 BPM | BODY MASS INDEX: 36.8 KG/M2 | OXYGEN SATURATION: 97 % | SYSTOLIC BLOOD PRESSURE: 142 MMHG | WEIGHT: 229 LBS | DIASTOLIC BLOOD PRESSURE: 62 MMHG

## 2023-09-15 DIAGNOSIS — E78.2 MIXED HYPERLIPIDEMIA: ICD-10-CM

## 2023-09-15 DIAGNOSIS — E55.9 VITAMIN D DEFICIENCY, UNSPECIFIED: ICD-10-CM

## 2023-09-15 DIAGNOSIS — J45.909 UNSPECIFIED ASTHMA, UNCOMPLICATED: ICD-10-CM

## 2023-09-15 DIAGNOSIS — R73.01 IMPAIRED FASTING GLUCOSE: ICD-10-CM

## 2023-09-15 PROCEDURE — 99214 OFFICE O/P EST MOD 30 MIN: CPT | Mod: 25

## 2023-09-15 PROCEDURE — 99406 BEHAV CHNG SMOKING 3-10 MIN: CPT

## 2023-09-15 PROCEDURE — 36415 COLL VENOUS BLD VENIPUNCTURE: CPT

## 2023-09-18 ENCOUNTER — TRANSCRIPTION ENCOUNTER (OUTPATIENT)
Age: 58
End: 2023-09-18

## 2023-09-18 LAB
25(OH)D3 SERPL-MCNC: 34.3 NG/ML
ALBUMIN SERPL ELPH-MCNC: 4.4 G/DL
ALP BLD-CCNC: 91 U/L
ALT SERPL-CCNC: 13 U/L
ANION GAP SERPL CALC-SCNC: 14 MMOL/L
AST SERPL-CCNC: 17 U/L
BILIRUB SERPL-MCNC: 0.3 MG/DL
BUN SERPL-MCNC: 19 MG/DL
CALCIUM SERPL-MCNC: 10.4 MG/DL
CHLORIDE SERPL-SCNC: 104 MMOL/L
CHOLEST SERPL-MCNC: 244 MG/DL
CO2 SERPL-SCNC: 25 MMOL/L
CREAT SERPL-MCNC: 0.72 MG/DL
EGFR: 97 ML/MIN/1.73M2
ESTIMATED AVERAGE GLUCOSE: 120 MG/DL
GLUCOSE SERPL-MCNC: 101 MG/DL
HBA1C MFR BLD HPLC: 5.8 %
HDLC SERPL-MCNC: 60 MG/DL
LDLC SERPL CALC-MCNC: 157 MG/DL
NONHDLC SERPL-MCNC: 184 MG/DL
POTASSIUM SERPL-SCNC: 4.8 MMOL/L
PROT SERPL-MCNC: 7.2 G/DL
SODIUM SERPL-SCNC: 143 MMOL/L
TRIGL SERPL-MCNC: 152 MG/DL

## 2023-10-05 ENCOUNTER — EMERGENCY (EMERGENCY)
Facility: HOSPITAL | Age: 58
LOS: 0 days | Discharge: HOME | End: 2023-10-06
Attending: EMERGENCY MEDICINE
Payer: COMMERCIAL

## 2023-10-05 VITALS
RESPIRATION RATE: 18 BRPM | DIASTOLIC BLOOD PRESSURE: 73 MMHG | SYSTOLIC BLOOD PRESSURE: 112 MMHG | HEART RATE: 80 BPM | OXYGEN SATURATION: 98 % | TEMPERATURE: 98 F

## 2023-10-05 VITALS — HEIGHT: 66 IN | WEIGHT: 210.1 LBS
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
## 2023-10-05 DIAGNOSIS — Z87.81 PERSONAL HISTORY OF (HEALED) TRAUMATIC FRACTURE: Chronic | ICD-10-CM

## 2023-10-05 DIAGNOSIS — M06.9 RHEUMATOID ARTHRITIS, UNSPECIFIED: ICD-10-CM

## 2023-10-05 DIAGNOSIS — S82.891A OTHER FRACTURE OF RIGHT LOWER LEG, INITIAL ENCOUNTER FOR CLOSED FRACTURE: ICD-10-CM

## 2023-10-05 DIAGNOSIS — S82.61XA DISPLACED FRACTURE OF LATERAL MALLEOLUS OF RIGHT FIBULA, INITIAL ENCOUNTER FOR CLOSED FRACTURE: ICD-10-CM

## 2023-10-05 DIAGNOSIS — M19.90 UNSPECIFIED OSTEOARTHRITIS, UNSPECIFIED SITE: ICD-10-CM

## 2023-10-05 DIAGNOSIS — K50.90 CROHN'S DISEASE, UNSPECIFIED, WITHOUT COMPLICATIONS: ICD-10-CM
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
## 2023-10-05 DIAGNOSIS — E78.5 HYPERLIPIDEMIA, UNSPECIFIED: ICD-10-CM
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
## 2023-10-05 DIAGNOSIS — M25.571 PAIN IN RIGHT ANKLE AND JOINTS OF RIGHT FOOT: ICD-10-CM

## 2023-10-05 DIAGNOSIS — Z87.01 PERSONAL HISTORY OF PNEUMONIA (RECURRENT): ICD-10-CM

## 2023-10-05 DIAGNOSIS — M81.0 AGE-RELATED OSTEOPOROSIS WITHOUT CURRENT PATHOLOGICAL FRACTURE: ICD-10-CM

## 2023-10-05 DIAGNOSIS — Z79.82 LONG TERM (CURRENT) USE OF ASPIRIN: ICD-10-CM

## 2023-10-05 PROCEDURE — 73600 X-RAY EXAM OF ANKLE: CPT | Mod: 26,RT

## 2023-10-05 PROCEDURE — 73600 X-RAY EXAM OF ANKLE: CPT | Mod: RT

## 2023-10-05 PROCEDURE — 27788 TREATMENT OF ANKLE FRACTURE: CPT | Mod: RT

## 2023-10-05 PROCEDURE — 70450 CT HEAD/BRAIN W/O DYE: CPT | Mod: MA

## 2023-10-05 PROCEDURE — 73700 CT LOWER EXTREMITY W/O DYE: CPT | Mod: MA,RT

## 2023-10-05 PROCEDURE — 73630 X-RAY EXAM OF FOOT: CPT | Mod: RT

## 2023-10-05 PROCEDURE — 99285 EMERGENCY DEPT VISIT HI MDM: CPT | Mod: 25

## 2023-10-05 PROCEDURE — 70450 CT HEAD/BRAIN W/O DYE: CPT | Mod: 26,MA

## 2023-10-05 PROCEDURE — 73590 X-RAY EXAM OF LOWER LEG: CPT | Mod: 26,RT

## 2023-10-05 PROCEDURE — 76376 3D RENDER W/INTRP POSTPROCES: CPT

## 2023-10-05 PROCEDURE — 73610 X-RAY EXAM OF ANKLE: CPT | Mod: RT

## 2023-10-05 PROCEDURE — 76376 3D RENDER W/INTRP POSTPROCES: CPT | Mod: 26

## 2023-10-05 PROCEDURE — 73700 CT LOWER EXTREMITY W/O DYE: CPT | Mod: 26,RT,MA

## 2023-10-05 PROCEDURE — 73590 X-RAY EXAM OF LOWER LEG: CPT | Mod: RT

## 2023-10-05 PROCEDURE — 99285 EMERGENCY DEPT VISIT HI MDM: CPT

## 2023-10-05 RX ORDER — ASPIRIN/CALCIUM CARB/MAGNESIUM 324 MG
1 TABLET ORAL
Qty: 14 | Refills: 0
Start: 2023-10-05 | End: 2023-10-18

## 2023-10-05 RX ORDER — IBUPROFEN 200 MG
600 TABLET ORAL ONCE
Refills: 0 | Status: COMPLETED | OUTPATIENT
Start: 2023-10-05 | End: 2023-10-05

## 2023-10-05 RX ADMIN — Medication 600 MILLIGRAM(S): at 21:28

## 2023-10-05 NOTE — ED STATDOCS - CLINICAL SUMMARY MEDICAL DECISION MAKING FREE TEXT BOX
59 y/o F with a R ankle sprain. XR ankle and foot to r/o fx, ace and cast with no fx and ortho referral, CT head as pt takes aspirin with head closed injury to r/o ICH which is unlikely, neuro exam intact. 59 y/o F with a R ankle sprain. XR ankle and foot to r/o fx, ace and air cast with no fx and ortho referral, CT head as pt takes aspirin with head closed injury to r/o ICH which is unlikely, neuro exam intact. 57 y/o F with a R ankle sprain. XR ankle and foot to r/o fx, ace and air cast with no fx and ortho referral, CT head as pt takes aspirin with head closed injury to r/o ICH which is unlikely, neuro exam intact.    Waiting on ORTHO. Care transitioned to evening team. ~Mario Toledo PA-C

## 2023-10-05 NOTE — ED STATDOCS - CARE PROVIDER_API CALL
Jorge Hawkins [place]  Orthopaedic Surgery  [address] [place] [id number]  Phone: [phone]  Fax: [phone]  Follow Up Time:

## 2023-10-05 NOTE — ED STATDOCS - PROGRESS NOTE DETAILS
+Fracture lateral malleolus on xrays. CT Head results pending. Spoke with ortho team, will see patient in ED. ~Mario Toledo PA-C Waiting on ORTHO. Care transitioned to evening team. ~Mario Toledo PA-C PA: Patient is a 57 y/o female with PMHx of arthritis, HLD, endoscopy, PNA, RA, osteoporosis who presents to ED c/o right ankle pain/injury s/p mechanical trip and fall down one step. +Mild occipital head strike. +Difficulty weight bearing on RLE. ~THOR CamejoC As per orthopedics, patient may be dc'd pending post reduction films. Advised daily full dose ASA and follow up with Dr. Hawkins next week. - Jasen Jones PA-C

## 2023-10-05 NOTE — ED STATDOCS - MDM ORDERS SUBMITTED SELECTION
[address] [facility] [address] [place] [address]   Office [phone], Fax [phone]      Discharge Summary     Patient: Kiki Amezquita       MRN: [id number]       YOB: 1939       Age: 67 y.o. Date of admission:  1/5/2018    Date of discharge:  1/8/18    Primary care provider:  Emily Arellano MD     Admitting provider:  Adrienne Yost MD    Discharging provider(s): Terrence Monsalve MD - Family Medicine Resident  Adrienne Yost MD - Family Medicine Attending     Consultations  · Cardiology - Dr. Tia Amado    Procedures  · None    Discharge destination: Home or Self Care. Admission diagnosis  · Symptomatic bradycardia      Final discharge diagnoses and brief hospital course  As per admitting provider, Dr. Dionisio Ahumada: \"Cee George is a 67 y.o. female with a h/o HLD, GERD, DM with nephropathy, OA, and DANIELA who presented to the ER complaining of dizziness. She reported she has been feeling dizzy for 10 days prior to ED presentation. Dizziness was worsened with positional changes. She reported increasingly worsened dizziness with increased frequency. She denied any changes in oral intake, HA, CP, SOB, increased urinary or bowel frequency, vision changes, or recent falls. No tinnitus, ear ache,  Palpitations, chest pain or sob. She is followed by Dr. Gaurav Singh as an outpatient. She had a cardiac workup in 2/2017 for SOB. Cardiac cath showed EF of 60% with 30% stenosis of LAD. Of note, she was recently started on Tizanidine by her PCP for a muscle spasm in her left back. She has been taking it nightly since mid December.      In the ER, vital signs were remarkable for HR 71, /88. Labs were unremarkable, trop neg X1. UA was unremarkable. CXR showed no acute process and CT of head showed no acute intracranial process. ECG has HR of 51 with LAD and poor R-wave progression. Cardiology was consulted for evaluation.     Conditions treated during this hospitalization:    Dizziness  - DDx includes vertigo vs drug side effect vs cardiac etiology vs orthostasis vs Labyrinthitis (more likely)   - Pt had prior cardiac workup that was negative for cardiac dysfunction. Echo in 2016 showed no regional wall abnormalities with normal thickness and Lexiscan showed no inducible ischemia. Cath in 2/17 shoed EF of 60% with 30% stenosis of LAD. Trop neg X2 and TSH normal.  - Pt has been taking tizanidine nightly for the past 3 weeks, could possibly 2/2 drug reaction as muscle relaxations have been known to cause dizziness and bradycardia.    - Orthostatics neg, appropriate BP response to position change. Patient dizziness appears to be related to inner ear or positional changes. MRI, MRA, and carotid dopplers were all negative. Her 6 minute walk test was normal.    -Dizziness improved with 1x dose of Meclizine 12.5mg. Pt discharged on Meclizine.  - Cardiology evaluated patient and did not think that dizziness was cardiac related, however they will arrange for outpatient event recorder. Cardiology also started patient on Lisinopril. Echo  This admission was WNL. Symptomatic Bradycardia:  Admitted for concern for symptomatic bradycardia as evidenced by dizziness and HR in the 40s. Cardiology consulted with negative evaluation. Outpatient event monitor. Held BB/CCB. Dizziness was evaluated as above. Likely inner ear v vestibular etiology. Neg for CVA/TIA.      Glaucoma - stable  - Continue home Latanoprost nightly       Candidal Intertrigo - POA, between gluteal folds.  - Continue Ketoconazole cream  - Follow up with PCP      Dyspnea - Stable on Breo. Extensive workup was negative for cardiac etiology. - Substituted home Breo for Brovana/Plumicort while IP  - Patient stated that she cannot afford Breo Ellipta, even the copay of $45 that case management found for her.  Patient needs to follow up with PCP for an alternative of treatment or any other method to get it.   Eddie Nelson- Stable. - Tylenol PRN  - Voltaran gel for hands      Diabetes Mellitus T2: Glycemia stable while IP  - Last HgA1c on 8/17 was 6.6.   - Resume home glipizide.      Hyperlipidemia: Last lipid panel on 8/17 and values were , TG 78, HDL 45, LDL 82   -Continue home atorvasatin      DANIELA - Continue CPAP qHS      Obesity  - PT with Body mass index is 36.01 kg/(m^2). .  - Encouraged lifestyle modifications and further follow up outpatient. Labs/Imaging Needed on follow up: Event Recorder and possible ECHO by Cardiology (Dr. Enrique York). Pending test results: At the time of your discharge the following test results are still pending: None. Please make sure you review these results with your outpatient follow-up provider(s). Specific symptoms to watch for: chest pain, shortness of breath, fever, chills, nausea, vomiting, diarrhea, change in mentation, falling, weakness, bleeding. DIET/what to eat:  Cardiac Diet    ACTIVITY:  Activity as tolerated    Wound care: None    Equipment needed:  None    Follow-up Care: Follow-up Information     Follow up With Details Comments [address], [place] Schedule an appointment as soon as possible for a visit in 3 days Hospital Stay and [address]  [phone]            Physical examination at discharge  Visit Vitals    /75    Pulse (!) 53    Temp 98 °F (36.7 °C)    Resp 12    Wt 196 lb (88.9 kg)    SpO2 98%    BMI 35.85 kg/m2      Physical Examination:  General: No acute distress. Alert. Cooperative. HEENT: Normocephalic. Atraumatic.     Conjunctiva pink. Sclera white. PERRL. MMM   Respiratory: CTAB. No w/r/r/c.   Cardiovascular: HR 57 bpm. Normal S1,S2. No m/r/g. 2+ pulses in DP bilaterally   GI: + bowel sounds. Nontender. No rebound tenderness or guarding. Nondistended   Extremities:  Neuro: No edema. No tenderness. AOX3. CN II-XII intact. 5/5 strength in all extremities. No disdiadochokinesia. Sensation and positional sense intact. Recent Results (from the past 24 hour(s))   GLUCOSE, POC    Collection Time: 01/06/18  4:30 PM   Result Value Ref Range    Glucose (POC) 118 (H) 65 - 100 mg/dL    Performed by Marva Villarreal 15, POC    Collection Time: 01/06/18  8:54 PM   Result Value Ref Range    Glucose (POC) 119 (H) 65 - 100 mg/dL    Performed by Gera Puckett (PCT)    METABOLIC PANEL, BASIC    Collection Time: 01/07/18  5:25 AM   Result Value Ref Range    Sodium 143 136 - 145 mmol/L    Potassium 4.2 3.5 - 5.1 mmol/L    Chloride 111 (H) 97 - 108 mmol/L    CO2 23 21 - 32 mmol/L    Anion gap 9 5 - 15 mmol/L    Glucose 114 (H) 65 - 100 mg/dL    BUN 8 6 - 20 MG/DL    Creatinine 0.72 0.55 - 1.02 MG/DL    BUN/Creatinine ratio 11 (L) 12 - 20      GFR est AA >60 >60 ml/min/1.73m2    GFR est non-AA >60 >60 ml/min/1.73m2    Calcium 8.4 (L) 8.5 - 10.1 MG/DL   CBC WITH AUTOMATED DIFF    Collection Time: 01/07/18  5:25 AM   Result Value Ref Range    WBC 5.8 3.6 - 11.0 K/uL    RBC 4.23 3.80 - 5.20 M/uL    HGB 11.8 11.5 - 16.0 g/dL    HCT 36.7 35.0 - 47.0 %    MCV 86.8 80.0 - 99.0 FL    MCH 27.9 26.0 - 34.0 PG    MCHC 32.2 30.0 - 36.5 g/dL    RDW 14.5 11.5 - 14.5 %    PLATELET 420 779 - 904 K/uL    NEUTROPHILS 53 32 - 75 %    LYMPHOCYTES 39 12 - 49 %    MONOCYTES 6 5 - 13 %    EOSINOPHILS 2 0 - 7 %    BASOPHILS 0 0 - 1 %    ABS. NEUTROPHILS 3.1 1.8 - 8.0 K/UL    ABS. LYMPHOCYTES 2.2 0.8 - 3.5 K/UL    ABS. MONOCYTES 0.4 0.0 - 1.0 K/UL    ABS. EOSINOPHILS 0.1 0.0 - 0.4 K/UL    ABS.  BASOPHILS 0.0 0.0 - 0.1 K/UL   DRUG SCREEN, URINE    Collection Time: 01/07/18  5:30 AM   Result Value Ref Range    AMPHETAMINES NEGATIVE  NEG      BARBITURATES NEGATIVE  NEG      BENZODIAZEPINES NEGATIVE  NEG      COCAINE NEGATIVE  NEG      METHADONE NEGATIVE  NEG      OPIATES NEGATIVE  NEG      PCP(PHENCYCLIDINE) NEGATIVE  NEG      THC (TH-CANNABINOL) NEGATIVE  NEG      Drug screen comment (NOTE)    GLUCOSE, POC    Collection Time: 01/07/18  8:28 AM   Result Value Ref Range    Glucose (POC) 149 (H) 65 - 100 mg/dL    Performed by Alex Mobley    GLUCOSE, POC    Collection Time: 01/07/18  9:51 AM   Result Value Ref Range    Glucose (POC) 125 (H) 65 - 100 mg/dL    Performed by Laurence Somers    GLUCOSE, POC    Collection Time: 01/07/18 11:32 AM   Result Value Ref Range    Glucose (POC) 102 (H) 65 - 100 mg/dL    Performed by Alex Mobley        Current Discharge Medication List      START taking these medications    Details   lisinopril (PRINIVIL, ZESTRIL) 10 mg tablet Take 1 Tab by mouth daily. Qty: 30 Tab, Refills: 0         CONTINUE these medications which have NOT CHANGED    Details   alendronate (FOSAMAX) 70 mg tablet Take 1 Tab by mouth every seven (7) days for 90 days. Qty: 12 Tab, Refills: 1      tiZANidine (ZANAFLEX) 2 mg tablet 1-2 tablets 1-2 hours prior to bedtime. Qty: 15 Tab, Refills: 0    Associated Diagnoses: Muscle spasm      ketoconazole (NIZORAL) 2 % topical cream Apply  to affected area daily. Qty: 30 g, Refills: 3    Associated Diagnoses: Skin irritation      glipiZIDE (GLUCOTROL) 5 mg tablet Take 1 Tab by mouth once over twenty-four (24) hours. Qty: 90 Tab, Refills: 0      atorvastatin (LIPITOR) 40 mg tablet Take 1 Tab by mouth once over twenty-four (24) hours for 30 days. Qty: 30 Tab, Refills: 5      Calcium Carbonate-Vit D3-Min 600 mg calcium- 400 unit tab Take 1 pill 2 x daily  Qty: 60 Tab, Refills: 5      nystatin (MYCOSTATIN) powder Apply  to affected area four (4) times daily. Until rash resolves. Qty: 60 g, Refills: 3    Associated Diagnoses: Pruritic intertrigo      fluticasone-vilanterol (BREO ELLIPTA) 100-25 mcg/dose inhaler Take 1 Puff by inhalation daily. Associated Diagnoses: Dyspnea, unspecified type      aspirin delayed-release 81 mg tablet Take  by mouth daily.     Associated Diagnoses: Dyspnea, unspecified type      glucose blood VI test strips (BLOOD GLUCOSE TEST) strip Accu-chek alexei plus test strips Test blood sugar once daily E11.9  Qty: 100 Strip, Refills: 1      Lancets misc accu-chek softclix lancets Check blood sugar daily E11.9  Qty: 100 Each, Refills: 1      oxybutynin (DITROPAN) 5 mg tablet Take 1 Tab by mouth three (3) times daily for 270 days. Qty: 270 Tab, Refills: 1      acetaminophen (TYLENOL) 650 mg CR tablet Take 650 mg by mouth every six (6) hours as needed for Pain.      gabapentin (NEURONTIN) 600 mg tablet Take  by mouth three (3) times daily. latanoprost (XALATAN) 0.005 % ophthalmic solution Administer 1 Drop to both eyes nightly. furosemide (LASIX) 20 mg tablet Take  by mouth every Monday, Wednesday, Friday. alcohol swabs (BD SINGLE USE SWABS REGULAR) padm 100 Each by Apply Externally route daily. Qty: 100 Pad, Refills: 1         STOP taking these medications       raNITIdine (ZANTAC) 150 mg tablet Comments:   Reason for Stopping:         GLUCOSE CONTROL soln Comments:   Reason for Stopping:               Admission imaging studies:    XR Results (maximum last 3): Results from [place] encounter on 01/05/18   XR CHEST PA LAT   Narrative Exam:  2 view chest    Indication: Weakness and dizziness. History of high cholesterol. COMPARISON: 11/15/2016    PA and lateral views demonstrate normal heart size. The patient is on a cardiac  monitor. The lungs are clear. Minimal linear scarring is stable in the left mid  to lower lung zone. Impression IMPRESSION:  1. No acute process      CT Results (maximum last 3): Results from [place] encounter on 01/05/18   CT HEAD WO CONT   Narrative EXAM:  CT HEAD WO CONT  INDICATION:   dizziness   additional history: Dizziness x1.5 weeks. Sinus congestion and nausea. COMPARISON: None. .  TECHNIQUE:   Unenhanced CT of the head was performed using 5 mm images. Coronal and sagittal  reformats were produced. Brain and bone windows were generated.    CT dose reduction was achieved through use of a standardized protocol tailored  for this examination and automatic exposure control for dose modulation. Elli Davidson FINDINGS:  The ventricles and sulci are normal in size, shape and configuration and  midline. There is no significant white matter disease. There is no intracranial  hemorrhage, extra-axial collection, mass, mass effect or midline shift. The  basilar cisterns are open. No acute infarct is identified. The bone windows  demonstrate no abnormalities. Minimal mucosal thickening in the posterior nasal  cavity. .       Impression IMPRESSION:   1. No evidence of acute intracranial abnormality by this modality. MRI Results (maximum last 3): Results from [place] encounter on 01/05/18   MRA BRAIN WO CONT   Narrative EXAM:  MRA BRAIN WO CONT  INDICATION:  Dizziness, 10 day history new onset dizziness. Also generalized  weakness and blurred vision when standing. TECHNIQUE: Axial 3-D time-of-flight MR angiography was performed from the  cranial base to the proximal intracranial vessels. MIP reconstructions were  created. COMPARISON: CT head 1/5/18  FINDINGS:  The distal internal carotid arteries are symmetric and normal in flow. Relatively symmetric flow is also noted in the middle and anterior cerebral  arteries. Normal flow demonstrated in distal vertebral arteries and the basilar artery. Impression IMPRESSION: Normal MRA of the head. MRI BRAIN W WO CONT   Narrative EXAM:  MRI BRAIN W WO CONT  INDICATION:  Patient presented to ED with 10 day history new onset dizziness  along with generalized weakness and blurred vision when standing. TECHNIQUE: Sagittal T1, axial FLAIR, T2, T1 and gradient echo T2-weighted images  of the head were obtained followed by intravenous infusion 18 mL ProHance repeat  axial and coronal T1-weighted images and axial diffusion weighted images. COMPARISON: CT 1/5/18, MRA today.   FINDINGS:  The ventricular size and configuration are within normal limits. Relatively mild foci of T2 hyperintensity in the cerebral white matter within  the range of normal for age. No abnormal restricted diffusion or abnormal  enhancement that would suggest acute or subacute brain infarction. Signal  demonstrated in the cerebral hemispheres, brain stem and cerebellum. No abnormal areas of intracranial enhancement. No abnormal diffusion. No evidence of intracranial hemorrhage, mass or abnormal extra-axial fluid  collections. Flow voids are present in the vertebral, basilar and carotid artery systems. The craniocervical junction is normal.   Minimal mucosal thickening left posterior ethmoid air cells. Structures of the  skull base are otherwise unremarkable. Impression IMPRESSION: No acute intracranial abnormality demonstrated. [place] VAS/US Results (maximum last 3):     Results from [place] encounter on 18   DUPLEX CAROTID BILATERAL    Name: Ryder Nicolas  MRN: [id number]    Outpatient  : 1939  HIS Order #: [id number]  [id number] [facility] [employer] Visit #: [id number]  Date: 2018     TYPE OF TEST: Cerebrovascular Duplex     REASON FOR TEST  Dizziness/vertigo     Right Carotid:-             Proximal               Mid                 Distal  cm/s  Systolic  Diastolic  Systolic  Diastolic  Systolic  Diastolic  CCA:    999.3      18.7                            65.5      13.8  Bulb:  ECA:    138.3      10.7  ICA:     81.7       9.0       38.0      11.7       65.5      21.5  ICA/CCA:  1.2       0.7     ICA Stenosis: Normal     Right Vertebral:-  Finding: Antegrade  Sys:       40.3  Sherri:       11.8     Right Subclavian:     Left Carotid:-            Proximal                Mid                 Distal  cm/s  Systolic  Diastolic  Systolic  Diastolic  Systolic  Diastolic  CCA:     80.5      22.0                            97.7      13.7  Bulb:  ECA:    162.4      13.7  ICA:     54.8       7.6       54.8      15.3       81.2      24.1  ICA/CCA:  0.6 0.6     ICA Stenosis: Normal     Left Vertebral:-  Finding: Antegrade  Sys:       50.5  Sherri:       14.2     Left Subclavian:     INTERPRETATION/FINDINGS  PROCEDURE:  Evaluation of the extracranial cerebrovascular arteries  with ultrasound (B-mode imaging, pulsed Doppler, color Doppler). Includes the common carotid, internal carotid, external carotid, and  vertebral arteries.     CONCLUSION:  1. No evidence of significant arterial occlusive disease in the  bilateral internal carotid arteries. 2. No significant stenosis in the external carotid arteries  bilaterally. 3. Antegrade flow in both vertebral arteries.     ADDITIONAL COMMENTS     I have personally reviewed the data relevant to the interpretation of  this study.     TECHNOLOGIST: ROGELIO Martinez  Signed: 01/06/2018 06:35 PM  -------------------------------------------------------------------------------------------------------------------    Chronic Diagnoses:    Problem List as of 1/7/2018  Date Reviewed: 12/29/2017          Codes Class Noted - Resolved    * (Principal)Symptomatic bradycardia ICD-10-CM: R00.1  ICD-9-CM: 427.89  1/5/2018 - Present        Type 2 diabetes mellitus with nephropathy ([facility] ICD-10-CM: E11.21  ICD-9-CM: 250.40, 583.81  12/29/2017 - Present        DANIELA on CPAP ICD-10-CM: G47.33, Z99.89  ICD-9-CM: 327.23, V46.8  7/31/2017 - Present    Overview Signed 7/31/2017  9:39 AM by Beau Fortune MD     Set at 5-8 per patient.               Pure hypercholesterolemia ICD-10-CM: E78.00  ICD-9-CM: 272.0  6/22/2016 - Present        Primary open angle glaucoma ICD-10-CM: H40.1190  ICD-9-CM: 365.11, 365.70  6/22/2016 - Present        Type 2 diabetes mellitus without complication (HCC) [id number]: E11.9  ICD-9-CM: 250.00  6/22/2016 - Present        Osteoporosis ICD-10-CM: M81.0  ICD-9-CM: 733.00  6/22/2016 - Present        Arthritis of left knee ICD-10-CM: M17.12  ICD-9-CM: 716.96  7/14/2014 - Present        GERD (gastroesophageal reflux disease) ICD-10-CM: K21.9  ICD-9-CM: 530.81  3/11/2010 - Present        RESOLVED: Diabetes ([facility] ICD-10-CM: E11.9  ICD-9-CM: 250.00  6/22/2016 - 6/22/2016        RESOLVED: Urge incontinence ICD-10-CM: N39.41  ICD-9-CM: 788.31  6/22/2016 - 6/22/2016                Signed:      Angela Dick MD   Family Medicine Resident      1/7/2018     Adrienne Yost MD   Family Medicine Attending    Attending Attestation:  I saw and evaluated the patient, performing the key elements of the service. I discussed the findings, assessment and plan with the resident and agree with the resident's findings and plan as documented in the resident's note. Pt seen and examined on 1/8/18.      Adrienne Yost MD  12:37 PM  01/14/18 Imaging Studies

## 2023-10-05 NOTE — CONSULT NOTE ADULT - SUBJECTIVE AND OBJECTIVE BOX
Patient is a 58yFemale community ambulator without assistive devices who presents to [place] ED w/ a c/o of right ankle pain. Patient states that she fell while walking down the steps. Admits to head-strike, but denies LOC States inability to walk immediately following the injury. Denies any numbness or tingling. Denies having any other pain elsewhere. No other orthopedic concerns at this time.    No pertinent past medical history    No pertinent past medical history    Rheumatoid arthritis    PNA (pneumonia)    Crohn's disease    H/O osteoporosis        No Known Allergies      PHYSICAL EXAM:  T(C): 36.6 (10-05-23 @ 20:09), Max: 36.6 (10-05-23 @ 20:09)  HR: 80 (10-05-23 @ 20:09) (80 - 80)  BP: 112/73 (10-05-23 @ 20:09) (112/73 - 112/73)  RR: 18 (10-05-23 @ 20:09) (18 - 18)  SpO2: 98% (10-05-23 @ 20:09) (98% - 98%)    Gen: NAD, Resting comfortably    RLE  Skin intact, +ecchymosis and swelling over lateral malleolus  +TTP over lateral malleolus  +EHL/FHL/TA/GSC  +SILT L3-S1  + DP  Compartments soft and compressible  No calf tenderness    Secondary Assessment:  NC/AT, NTTP of clavicles, NTTP of C-,T-,L-Spine, NTTP of Pelvis  RUEs: NTTP of Shoulder, Elbow, Wrist, Hand; NT with AROM/PROM of Shoulder, Elbow, Wrist, Hand; AIN/PIN/Med/Uln/Msc/Rad/Ax intact  LEs: Able to SLR, NT with Log Roll, NT with Heel Strike, NTTP of Hip, Knee, Ankle, Foot; NT with AROM/PROM of Hip, Knee, Ankle, Foot; Q/H/Gsc/TA/EHL/FHL intact    A/P: 58y Female with R ankle fracture    S/p Trilam splint  Pain control  NWB RLE in splint, keep c/d/I, cane/crutches/walker as needed  Ice/elevation  Possible need for surgical intervention in future discussed, all questions answered  Follow up with Dr. Hawkins within 1 week, call office for appointment  Ortho stable for discharge     Patient is a 58yFemale community ambulator without assistive devices who presents to [place] ED w/ a c/o of right ankle pain. Patient states that she fell while walking down the steps. Admits to head-strike, but denies LOC States inability to walk immediately following the injury. Denies any numbness or tingling. Denies having any other pain elsewhere. No other orthopedic concerns at this time.    No pertinent past medical history    No pertinent past medical history    Rheumatoid arthritis    PNA (pneumonia)    Crohn's disease    H/O osteoporosis        No Known Allergies      PHYSICAL EXAM:  T(C): 36.6 (10-05-23 @ 20:09), Max: 36.6 (10-05-23 @ 20:09)  HR: 80 (10-05-23 @ 20:09) (80 - 80)  BP: 112/73 (10-05-23 @ 20:09) (112/73 - 112/73)  RR: 18 (10-05-23 @ 20:09) (18 - 18)  SpO2: 98% (10-05-23 @ 20:09) (98% - 98%)    Gen: NAD, Resting comfortably    RLE  Skin intact, +ecchymosis and swelling over lateral malleolus  +TTP over lateral malleolus  +EHL/FHL/TA/GSC  +SILT dp/sp/saph/sural/tibial  + DP  Compartments soft and compressible  No calf tenderness    Secondary Assessment:  NC/AT, NTTP of clavicles, NTTP of C-,T-,L-Spine, NTTP of Pelvis  RUEs: NTTP of Shoulder, Elbow, Wrist, Hand; NT with AROM/PROM of Shoulder, Elbow, Wrist, Hand; AIN/PIN/Med/Uln/Msc/Rad/Ax intact  LEs: superficial abrasion over left knee, Able to SLR, NT with Log Roll, NT with Heel Strike, NTTP of Hip, Knee, Ankle, Foot; NT with AROM/PROM of Hip, Knee, Ankle, Foot; Q/H/Gsc/TA/EHL/FHL intact    imaging:  Right dewey B ankle fracture     Procedure:  The benefits and risks of joint reduction were explained to the patient, whom agreed to proceed with right ankle reduction and splinting. A well padded short leg trilam splint was applied and reduction maneuvers performed. The patient tolerated the procedure well and there were no complications. Patient was neurovascularly intact after the procedure.      A/P: 58y Female with R dewey B ankle fracture     S/p Trilam splint  Pain control  NWB RLE in splint, keep c/d/I, cane/crutches/walker as needed  Ice/elevation  Aspirin 325mg qday for dvt prophylaxis   Follow up with Dr. Whitney within 1 week, call office for appointment  Ortho stable for discharge  plan of care and fu discussed with patient and  who agree with above   imaging and clinical exam discussed with dr. whitney who agrees with above

## 2023-10-05 NOTE — ED ADULT TRIAGE NOTE - CHIEF COMPLAINT QUOTE
Pt came into the ED with c/o right sided ankle injury. Pt tripped and fell down ONE step. Pt states "I hit my head slightly but my ankle is what hurts the most". Pt takes baby ASA. Pt took Tylenol PTA. Pt has hx of arthritis. Pt is in a wheelchair at this time and ice applied. MD Henriquez aware, pt doesn't meet NA criteria. Pt directed to intake.

## 2023-10-05 NOTE — ED STATDOCS - PATIENT PORTAL LINK FT
You can access the FollowMyHealth Patient Portal offered by [facility] by registering at the following website: [email or web address]. By joining [employer]’s FollowMyHealth portal, you will also be able to view your health information using other applications (apps) compatible with our system.

## 2023-10-05 NOTE — ED STATDOCS - NS ED ATTENDING STATEMENT MOD
This was a shared visit with the ROX. I reviewed and verified the documentation and independently performed the documented:

## 2023-10-05 NOTE — ED STATDOCS - MUSCULOSKELETAL, MLM
no scalp tenderness or hematoma. no midline spinal tenderness. +tenderness of the proximal R foot anteriorly and tenderness on the medial side of the R lateral malleolus of the ankle

## 2023-10-05 NOTE — ED STATDOCS - OBJECTIVE STATEMENT
59 y/o female with PMHx of arthritis, HLD, endoscopy, PNA, RA, osteoporosis presents to ED c/o right ankle pain/injury s/p mechanical trip and fall down one step. Reports mild occipital head strike. Able to minimally bear weight to affected extremity secondary to pain. On baby aspirin daily. Additionally c/o pain to 4th digit of right foot s/p dropping a bottle on it last month.

## 2023-10-05 NOTE — ED STATDOCS - PHYSICAL EXAMINATION
PA NOTE: GEN: AOX3, NAD. HEENT: Throat clear. Airway is patent. EYES: PERRLA. EOMI. Head: NC/AT. NECK: Supple, No JVD. FROM. C-spine non-tender. CV:S1S2, RRR, LUNGS: Non-labored breathing, no tachypnea. O2sat 100% RA. CTA b/l. No w/r/r. CHEST: Equal chest expansion and rise. No deformity. ABD: Soft, NT/ND, no rebound, no guarding. No CVAT. EXT: No e/c/c. 2+ distal pulses. RIGHT ANKLE: +Subtle deformity right lateral malleolus. Painful ROM. +Tenderness right lateral malleolus. NVI. 2+ distal pulses. SKIN: No rashes. NEURO: No focal deficits. CN II-XII intact. FROM. 5/5 motor and sensory. ~Mario Toledo PA-C

## 2023-10-06 ENCOUNTER — NON-APPOINTMENT (OUTPATIENT)
Age: 58
End: 2023-10-06

## 2023-10-06 ENCOUNTER — APPOINTMENT (OUTPATIENT)
Dept: ORTHOPEDIC SURGERY | Facility: CLINIC | Age: 58
End: 2023-10-06
Payer: COMMERCIAL

## 2023-10-06 VITALS — WEIGHT: 230 LBS | HEIGHT: 66 IN | BODY MASS INDEX: 36.96 KG/M2

## 2023-10-06 PROCEDURE — 73610 X-RAY EXAM OF ANKLE: CPT | Mod: 26,RT

## 2023-10-06 PROCEDURE — 73630 X-RAY EXAM OF FOOT: CPT | Mod: 26,RT

## 2023-10-06 PROCEDURE — 27786 TREATMENT OF ANKLE FRACTURE: CPT | Mod: LT

## 2023-10-06 PROCEDURE — 99214 OFFICE O/P EST MOD 30 MIN: CPT | Mod: 57

## 2023-10-11 ENCOUNTER — APPOINTMENT (OUTPATIENT)
Dept: ORTHOPEDIC SURGERY | Facility: CLINIC | Age: 58
End: 2023-10-11

## 2023-10-27 ENCOUNTER — APPOINTMENT (OUTPATIENT)
Dept: ORTHOPEDIC SURGERY | Facility: CLINIC | Age: 58
End: 2023-10-27
Payer: COMMERCIAL

## 2023-10-27 PROCEDURE — 99024 POSTOP FOLLOW-UP VISIT: CPT

## 2023-10-27 PROCEDURE — 73610 X-RAY EXAM OF ANKLE: CPT | Mod: RT

## 2023-11-20 ENCOUNTER — APPOINTMENT (OUTPATIENT)
Dept: ORTHOPEDIC SURGERY | Facility: CLINIC | Age: 58
End: 2023-11-20
Payer: COMMERCIAL

## 2023-11-20 DIAGNOSIS — S82.61XA DISPLACED FRACTURE OF LATERAL MALLEOLUS OF RIGHT FIBULA, INITIAL ENCOUNTER FOR CLOSED FRACTURE: ICD-10-CM

## 2023-11-20 DIAGNOSIS — M25.571 PAIN IN RIGHT ANKLE AND JOINTS OF RIGHT FOOT: ICD-10-CM

## 2023-11-20 PROCEDURE — 73610 X-RAY EXAM OF ANKLE: CPT | Mod: RT

## 2023-11-20 PROCEDURE — 99024 POSTOP FOLLOW-UP VISIT: CPT

## 2023-11-22 ENCOUNTER — APPOINTMENT (OUTPATIENT)
Dept: BREAST CENTER | Facility: CLINIC | Age: 58
End: 2023-11-22
Payer: COMMERCIAL

## 2023-11-22 VITALS — RESPIRATION RATE: 16 BRPM | WEIGHT: 230 LBS | BODY MASS INDEX: 36.96 KG/M2 | HEIGHT: 66 IN

## 2023-11-22 DIAGNOSIS — L72.3 SEBACEOUS CYST: ICD-10-CM

## 2023-11-22 PROCEDURE — 76642 ULTRASOUND BREAST LIMITED: CPT

## 2023-11-22 PROCEDURE — 19020 MASTOTOMY EXPL DRG ABSC DP: CPT | Mod: 59

## 2023-11-22 PROCEDURE — 99203 OFFICE O/P NEW LOW 30 MIN: CPT | Mod: 25

## 2023-11-29 LAB — CORE LAB BIOPSY: NORMAL

## 2023-11-30 ENCOUNTER — APPOINTMENT (OUTPATIENT)
Dept: BREAST CENTER | Facility: CLINIC | Age: 58
End: 2023-11-30
Payer: COMMERCIAL

## 2023-11-30 VITALS — HEIGHT: 66 IN | BODY MASS INDEX: 36.96 KG/M2 | WEIGHT: 230 LBS | RESPIRATION RATE: 16 BRPM

## 2023-11-30 DIAGNOSIS — N61.1 ABSCESS OF THE BREAST AND NIPPLE: ICD-10-CM

## 2023-11-30 PROCEDURE — 99024 POSTOP FOLLOW-UP VISIT: CPT

## 2023-12-03 PROBLEM — N61.1 BREAST ABSCESS: Status: ACTIVE | Noted: 2023-11-28

## 2024-01-11 ENCOUNTER — NON-APPOINTMENT (OUTPATIENT)
Age: 59
End: 2024-01-11

## 2024-01-23 NOTE — ED ADULT NURSE NOTE - CAS EDP DISCH DISPOSITION ADMI
Patient: Yamini Ocampo    Procedure Summary       Date: 01/23/24 Room / Location: [facility] ENDOSCOPY 01 / [facility] ENDOSCOPY    Anesthesia Start: 1405 Anesthesia Stop:     Procedure: ESOPHAGOGASTRODUODENOSCOPY Diagnosis:       Dyspepsia      Weight loss      Intestinal metaplasia of stomach      (gastritis)    Surgeons: KATHARINE Prakash MD Anesthesiologist: Charles Kent CRNA    Anesthesia Type: MAC ASA Status: 3            Anesthesia Type: MAC    Vitals Value Taken Time   /45 01/23/24 1418   Temp 36.5C 01/23/24 1418   Pulse 79 01/23/24 1418   Resp 20 01/23/24 1418   SpO2 95% 01/23/24 1418       [facility] AN Post Evaluation:   Patient Evaluated in PACU  Patient Participation: complete - patient participated  Level of Consciousness: sleepy but conscious  Pain Score: 0  Pain Management: adequate  Airway Patency:patent  Dental exam unchanged from preop  Yes    Cardiovascular Status: acceptable and stable  Respiratory Status: acceptable and room air  Postoperative Hydration acceptable      Charles Kent CRNA  1/23/2024 2:18 PM   Surgery

## 2024-01-29 ENCOUNTER — NON-APPOINTMENT (OUTPATIENT)
Age: 59
End: 2024-01-29

## 2024-03-20 NOTE — ED PROVIDER NOTE - CONDITION AT DISCHARGE:
Preventive Care Visit  [facility]  Jaziel Smith MD, Family Medicine  Mar 20, 2024      Assessment & Plan     Health care maintenance  Patient here for annual exam  Obtain blood work through National testing 3 with an NI[employer]  Reviewed results with him today slight elevation of his cholesterol comparison to last year  Discussed diet and exercise                Counseling  Appropriate preventive services were discussed with this patient, including applicable screening as appropriate for fall prevention, nutrition, physical activity, Tobacco-use cessation, weight loss and cognition.  Checklist reviewing preventive services available has been given to the patient.  Reviewed patient's diet, addressing concerns and/or questions.           Tacos Yadav is a 26 year old, presenting for the following:  Physical (Discuss lab results )        3/20/2024     2:09 PM   Additional Questions   Roomed by Daria KUHN  Patient here for annual exam  Obtain blood work through the [employer] [place] of health randomization study had a lot of blood work completed in STD testing-STD testing was negative he had no concerns.  Blood work was all essentially normal except a slightly elevated total cholesterol in comparison to last year.  We discussed diet and exercise.  He has made some changes in his eating habits recently has been eating better.  He has been trying exercise on a regular basis.  Recently had a promotion at work and has been under some increased stress with increased demand but is happy with his job.            3/13/2024   General Health   How would you rate your overall physical health? Excellent   Feel stress (tense, anxious, or unable to sleep) To some extent   (!) STRESS CONCERN      3/13/2024   Nutrition   Three or more servings of calcium each day? Yes   Diet: Carbohydrate counting    Breakfast skipped   How many servings of fruit and vegetables per day? (!) 2-3   How  "many sweetened beverages each day? 0-1         3/13/2024   Exercise   Days per week of moderate/strenous exercise 4 days   Average minutes spent exercising at this level 50 min         3/13/2024   Social Factors   Frequency of gathering with friends or relatives More than three times a week   Worry food won't last until get money to buy more No   Food not last or not have enough money for food? No   Do you have housing?  Yes   Are you worried about losing your housing? No   Lack of transportation? No   Unable to get utilities (heat,electricity)? No         3/13/2024   Dental   Dentist two times every year? Yes         3/13/2024   TB Screening   Were you born outside of the [place]? No         Today's PHQ-2 Score:       3/20/2024     2:01 PM   PHQ-2 ( 1999 [employer])   Q1: Little interest or pleasure in doing things 0   Q2: Feeling down, depressed or hopeless 0   PHQ-2 Score 0   Q1: Little interest or pleasure in doing things Not at all   Q2: Feeling down, depressed or hopeless Not at all   PHQ-2 Score 0           3/13/2024   Substance Use   Alcohol more than 3/day or more than 7/wk No   Do you use any other substances recreationally? (!) ALCOHOL     Social History     Tobacco Use    Smoking status: Never    Smokeless tobacco: Never   Vaping Use    Vaping Use: Some days    Substances: Nicotine, Flavoring    Devices: Disposable   Substance Use Topics    Alcohol use: Yes    Drug use: Never           3/13/2024   STI Screening   New sexual partner(s) since last STI/HIV test? No         3/13/2024   Contraception/Family Planning   Questions about contraception or family planning No        Reviewed and updated as needed this visit by Provider                             Objective    Exam  /70 (BP Location: Left arm, Patient Position: Sitting, Cuff Size: Adult Regular)   Pulse 71   Temp 97.8  F (36.6  C) (Oral)   Resp 16   Ht 1.702 m (5' 7\")   Wt 69.4 kg (153 lb)   SpO2 99%   BMI 23.96 kg/m     Estimated body mass index " "is 23.96 kg/m  as calculated from the following:    Height as of this encounter: 1.702 m (5' 7\").    Weight as of this encounter: 69.4 kg (153 lb).    Physical Exam  GENERAL: alert and no distress  EYES: Eyes grossly normal to inspection, PERRL and conjunctivae and sclerae normal  RESP: lungs clear to auscultation - no rales, rhonchi or wheezes  CV: regular rate and rhythm, normal S1 S2, no S3 or S4, no murmur, click or rub, no peripheral edema  ABDOMEN: bowel sounds normal  MS: no gross musculoskeletal defects noted, no edema  NEURO: Normal strength and tone, mentation intact and speech normal  PSYCH: mentation appears normal, affect normal/bright        Signed Electronically by: Jaziel Smith MD    " Satisfactory

## 2024-04-04 ENCOUNTER — NON-APPOINTMENT (OUTPATIENT)
Age: 59
End: 2024-04-04

## 2024-04-22 ENCOUNTER — TRANSCRIPTION ENCOUNTER (OUTPATIENT)
Age: 59
End: 2024-04-22

## 2024-04-22 ENCOUNTER — INPATIENT (INPATIENT)
Facility: HOSPITAL | Age: 59
LOS: 0 days | Discharge: HOME | DRG: 309 | End: 2024-04-22
Attending: STUDENT IN AN ORGANIZED HEALTH CARE EDUCATION/TRAINING PROGRAM | Admitting: STUDENT IN AN ORGANIZED HEALTH CARE EDUCATION/TRAINING PROGRAM
Payer: COMMERCIAL

## 2024-04-22 ENCOUNTER — RESULT REVIEW (OUTPATIENT)
Age: 59
End: 2024-04-22

## 2024-04-22 VITALS
DIASTOLIC BLOOD PRESSURE: 48 MMHG | OXYGEN SATURATION: 92 % | SYSTOLIC BLOOD PRESSURE: 100 MMHG | HEART RATE: 64 BPM | TEMPERATURE: 99 F | RESPIRATION RATE: 18 BRPM

## 2024-04-22 VITALS — HEIGHT: 66 IN | WEIGHT: 220.02 LBS

## 2024-04-22 DIAGNOSIS — Z87.81 PERSONAL HISTORY OF (HEALED) TRAUMATIC FRACTURE: Chronic | ICD-10-CM

## 2024-04-22 DIAGNOSIS — R07.9 CHEST PAIN, UNSPECIFIED: ICD-10-CM

## 2024-04-22 LAB
A1C WITH ESTIMATED AVERAGE GLUCOSE RESULT: 5.6 % — SIGNIFICANT CHANGE UP (ref 4–5.6)
ALBUMIN SERPL ELPH-MCNC: 3.3 G/DL — SIGNIFICANT CHANGE UP (ref 3.3–5)
ALP SERPL-CCNC: 92 U/L — SIGNIFICANT CHANGE UP (ref 40–120)
ALT FLD-CCNC: 18 U/L — SIGNIFICANT CHANGE UP (ref 12–78)
ANION GAP SERPL CALC-SCNC: 3 MMOL/L — LOW (ref 5–17)
ANION GAP SERPL CALC-SCNC: 3 MMOL/L — LOW (ref 5–17)
APTT BLD: 34.4 SEC — SIGNIFICANT CHANGE UP (ref 24.5–35.6)
AST SERPL-CCNC: 15 U/L — SIGNIFICANT CHANGE UP (ref 15–37)
BASOPHILS # BLD AUTO: 0.1 K/UL — SIGNIFICANT CHANGE UP (ref 0–0.2)
BASOPHILS NFR BLD AUTO: 1.1 % — SIGNIFICANT CHANGE UP (ref 0–2)
BILIRUB SERPL-MCNC: 0.2 MG/DL — SIGNIFICANT CHANGE UP (ref 0.2–1.2)
BUN SERPL-MCNC: 15 MG/DL — SIGNIFICANT CHANGE UP (ref 7–23)
BUN SERPL-MCNC: 19 MG/DL — SIGNIFICANT CHANGE UP (ref 7–23)
CALCIUM SERPL-MCNC: 8.8 MG/DL — SIGNIFICANT CHANGE UP (ref 8.5–10.1)
CALCIUM SERPL-MCNC: 9 MG/DL — SIGNIFICANT CHANGE UP (ref 8.5–10.1)
CHLORIDE SERPL-SCNC: 108 MMOL/L — SIGNIFICANT CHANGE UP (ref 96–108)
CHLORIDE SERPL-SCNC: 110 MMOL/L — HIGH (ref 96–108)
CHOLEST SERPL-MCNC: 151 MG/DL — SIGNIFICANT CHANGE UP
CO2 SERPL-SCNC: 24 MMOL/L — SIGNIFICANT CHANGE UP (ref 22–31)
CO2 SERPL-SCNC: 27 MMOL/L — SIGNIFICANT CHANGE UP (ref 22–31)
CREAT SERPL-MCNC: 0.7 MG/DL — SIGNIFICANT CHANGE UP (ref 0.5–1.3)
CREAT SERPL-MCNC: 0.75 MG/DL — SIGNIFICANT CHANGE UP (ref 0.5–1.3)
D DIMER BLD IA.RAPID-MCNC: <150 NG/ML DDU — SIGNIFICANT CHANGE UP
EGFR: 101 ML/MIN/1.73M2 — SIGNIFICANT CHANGE UP
EGFR: 93 ML/MIN/1.73M2 — SIGNIFICANT CHANGE UP
EOSINOPHIL # BLD AUTO: 0.57 K/UL — HIGH (ref 0–0.5)
EOSINOPHIL NFR BLD AUTO: 6.3 % — HIGH (ref 0–6)
ESTIMATED AVERAGE GLUCOSE: 114 MG/DL — SIGNIFICANT CHANGE UP (ref 68–114)
GLUCOSE SERPL-MCNC: 112 MG/DL — HIGH (ref 70–99)
GLUCOSE SERPL-MCNC: 118 MG/DL — HIGH (ref 70–99)
HCT VFR BLD CALC: 39.6 % — SIGNIFICANT CHANGE UP (ref 34.5–45)
HCT VFR BLD CALC: 40 % — SIGNIFICANT CHANGE UP (ref 34.5–45)
HDLC SERPL-MCNC: 56 MG/DL — SIGNIFICANT CHANGE UP
HGB BLD-MCNC: 12.8 G/DL — SIGNIFICANT CHANGE UP (ref 11.5–15.5)
HGB BLD-MCNC: 12.9 G/DL — SIGNIFICANT CHANGE UP (ref 11.5–15.5)
IMM GRANULOCYTES NFR BLD AUTO: 0.2 % — SIGNIFICANT CHANGE UP (ref 0–0.9)
INR BLD: 0.9 RATIO — SIGNIFICANT CHANGE UP (ref 0.85–1.18)
LIPID PNL WITH DIRECT LDL SERPL: 75 MG/DL — SIGNIFICANT CHANGE UP
LYMPHOCYTES # BLD AUTO: 2.18 K/UL — SIGNIFICANT CHANGE UP (ref 1–3.3)
LYMPHOCYTES # BLD AUTO: 24.2 % — SIGNIFICANT CHANGE UP (ref 13–44)
MAGNESIUM SERPL-MCNC: 2.3 MG/DL — SIGNIFICANT CHANGE UP (ref 1.6–2.6)
MCHC RBC-ENTMCNC: 28.8 PG — SIGNIFICANT CHANGE UP (ref 27–34)
MCHC RBC-ENTMCNC: 28.9 PG — SIGNIFICANT CHANGE UP (ref 27–34)
MCHC RBC-ENTMCNC: 32.3 GM/DL — SIGNIFICANT CHANGE UP (ref 32–36)
MCHC RBC-ENTMCNC: 32.3 GM/DL — SIGNIFICANT CHANGE UP (ref 32–36)
MCV RBC AUTO: 89 FL — SIGNIFICANT CHANGE UP (ref 80–100)
MCV RBC AUTO: 89.7 FL — SIGNIFICANT CHANGE UP (ref 80–100)
MONOCYTES # BLD AUTO: 0.66 K/UL — SIGNIFICANT CHANGE UP (ref 0–0.9)
MONOCYTES NFR BLD AUTO: 7.3 % — SIGNIFICANT CHANGE UP (ref 2–14)
NEUTROPHILS # BLD AUTO: 5.49 K/UL — SIGNIFICANT CHANGE UP (ref 1.8–7.4)
NEUTROPHILS NFR BLD AUTO: 60.9 % — SIGNIFICANT CHANGE UP (ref 43–77)
NON HDL CHOLESTEROL: 95 MG/DL — SIGNIFICANT CHANGE UP
PHOSPHATE SERPL-MCNC: 3 MG/DL — SIGNIFICANT CHANGE UP (ref 2.5–4.5)
PLATELET # BLD AUTO: 317 K/UL — SIGNIFICANT CHANGE UP (ref 150–400)
PLATELET # BLD AUTO: 321 K/UL — SIGNIFICANT CHANGE UP (ref 150–400)
POTASSIUM SERPL-MCNC: 3.9 MMOL/L — SIGNIFICANT CHANGE UP (ref 3.5–5.3)
POTASSIUM SERPL-MCNC: 4.5 MMOL/L — SIGNIFICANT CHANGE UP (ref 3.5–5.3)
POTASSIUM SERPL-SCNC: 3.9 MMOL/L — SIGNIFICANT CHANGE UP (ref 3.5–5.3)
POTASSIUM SERPL-SCNC: 4.5 MMOL/L — SIGNIFICANT CHANGE UP (ref 3.5–5.3)
PROT SERPL-MCNC: 7.1 GM/DL — SIGNIFICANT CHANGE UP (ref 6–8.3)
PROTHROM AB SERPL-ACNC: 10.2 SEC — SIGNIFICANT CHANGE UP (ref 9.5–13)
RBC # BLD: 4.45 M/UL — SIGNIFICANT CHANGE UP (ref 3.8–5.2)
RBC # BLD: 4.46 M/UL — SIGNIFICANT CHANGE UP (ref 3.8–5.2)
RBC # FLD: 13 % — SIGNIFICANT CHANGE UP (ref 10.3–14.5)
RBC # FLD: 13.1 % — SIGNIFICANT CHANGE UP (ref 10.3–14.5)
SODIUM SERPL-SCNC: 137 MMOL/L — SIGNIFICANT CHANGE UP (ref 135–145)
SODIUM SERPL-SCNC: 138 MMOL/L — SIGNIFICANT CHANGE UP (ref 135–145)
TRIGL SERPL-MCNC: 113 MG/DL — SIGNIFICANT CHANGE UP
TROPONIN I, HIGH SENSITIVITY RESULT: 4.03 NG/L — SIGNIFICANT CHANGE UP
TROPONIN I, HIGH SENSITIVITY RESULT: 4.06 NG/L — SIGNIFICANT CHANGE UP
TROPONIN I, HIGH SENSITIVITY RESULT: 4.75 NG/L — SIGNIFICANT CHANGE UP
WBC # BLD: 7.1 K/UL — SIGNIFICANT CHANGE UP (ref 3.8–10.5)
WBC # BLD: 9.02 K/UL — SIGNIFICANT CHANGE UP (ref 3.8–10.5)
WBC # FLD AUTO: 7.1 K/UL — SIGNIFICANT CHANGE UP (ref 3.8–10.5)
WBC # FLD AUTO: 9.02 K/UL — SIGNIFICANT CHANGE UP (ref 3.8–10.5)

## 2024-04-22 PROCEDURE — 99239 HOSP IP/OBS DSCHRG MGMT >30: CPT

## 2024-04-22 PROCEDURE — 80048 BASIC METABOLIC PNL TOTAL CA: CPT

## 2024-04-22 PROCEDURE — 84484 ASSAY OF TROPONIN QUANT: CPT

## 2024-04-22 PROCEDURE — 83735 ASSAY OF MAGNESIUM: CPT

## 2024-04-22 PROCEDURE — 71045 X-RAY EXAM CHEST 1 VIEW: CPT | Mod: 26

## 2024-04-22 PROCEDURE — 85027 COMPLETE CBC AUTOMATED: CPT

## 2024-04-22 PROCEDURE — 84100 ASSAY OF PHOSPHORUS: CPT

## 2024-04-22 PROCEDURE — 83036 HEMOGLOBIN GLYCOSYLATED A1C: CPT

## 2024-04-22 PROCEDURE — 93306 TTE W/DOPPLER COMPLETE: CPT | Mod: 26

## 2024-04-22 PROCEDURE — 99285 EMERGENCY DEPT VISIT HI MDM: CPT

## 2024-04-22 PROCEDURE — 93010 ELECTROCARDIOGRAM REPORT: CPT

## 2024-04-22 PROCEDURE — 80061 LIPID PANEL: CPT

## 2024-04-22 PROCEDURE — 36415 COLL VENOUS BLD VENIPUNCTURE: CPT

## 2024-04-22 RX ORDER — ONDANSETRON 8 MG/1
4 TABLET, FILM COATED ORAL EVERY 6 HOURS
Refills: 0 | Status: DISCONTINUED | OUTPATIENT
Start: 2024-04-22 | End: 2024-04-22

## 2024-04-22 RX ORDER — NICOTINE POLACRILEX 2 MG
1 GUM BUCCAL
Qty: 1 | Refills: 0
Start: 2024-04-22

## 2024-04-22 RX ORDER — ATORVASTATIN CALCIUM 80 MG/1
20 TABLET, FILM COATED ORAL AT BEDTIME
Refills: 0 | Status: DISCONTINUED | OUTPATIENT
Start: 2024-04-22 | End: 2024-04-22

## 2024-04-22 RX ORDER — HEPARIN SODIUM 5000 [USP'U]/ML
5000 INJECTION INTRAVENOUS; SUBCUTANEOUS EVERY 12 HOURS
Refills: 0 | Status: DISCONTINUED | OUTPATIENT
Start: 2024-04-22 | End: 2024-04-22

## 2024-04-22 RX ORDER — KETOROLAC TROMETHAMINE 30 MG/ML
30 SYRINGE (ML) INJECTION ONCE
Refills: 0 | Status: DISCONTINUED | OUTPATIENT
Start: 2024-04-22 | End: 2024-04-22

## 2024-04-22 RX ORDER — PANTOPRAZOLE SODIUM 20 MG/1
1 TABLET, DELAYED RELEASE ORAL
Qty: 30 | Refills: 0
Start: 2024-04-22

## 2024-04-22 RX ORDER — ACETAMINOPHEN 500 MG
650 TABLET ORAL EVERY 6 HOURS
Refills: 0 | Status: DISCONTINUED | OUTPATIENT
Start: 2024-04-22 | End: 2024-04-22

## 2024-04-22 RX ORDER — MORPHINE SULFATE 50 MG/1
2 CAPSULE, EXTENDED RELEASE ORAL EVERY 4 HOURS
Refills: 0 | Status: DISCONTINUED | OUTPATIENT
Start: 2024-04-22 | End: 2024-04-22

## 2024-04-22 RX ORDER — ASPIRIN/CALCIUM CARB/MAGNESIUM 324 MG
325 TABLET ORAL ONCE
Refills: 0 | Status: COMPLETED | OUTPATIENT
Start: 2024-04-22 | End: 2024-04-22

## 2024-04-22 RX ORDER — ASPIRIN/CALCIUM CARB/MAGNESIUM 324 MG
81 TABLET ORAL DAILY
Refills: 0 | Status: DISCONTINUED | OUTPATIENT
Start: 2024-04-22 | End: 2024-04-22

## 2024-04-22 RX ORDER — NITROGLYCERIN 6.5 MG
0.4 CAPSULE, EXTENDED RELEASE ORAL
Refills: 0 | Status: DISCONTINUED | OUTPATIENT
Start: 2024-04-22 | End: 2024-04-22

## 2024-04-22 RX ADMIN — Medication 30 MILLILITER(S): at 02:53

## 2024-04-22 RX ADMIN — Medication 81 MILLIGRAM(S): at 09:07

## 2024-04-22 RX ADMIN — HEPARIN SODIUM 5000 UNIT(S): 5000 INJECTION INTRAVENOUS; SUBCUTANEOUS at 09:08

## 2024-04-22 RX ADMIN — Medication 325 MILLIGRAM(S): at 03:39

## 2024-04-22 RX ADMIN — Medication 30 MILLIGRAM(S): at 02:55

## 2024-04-22 NOTE — ED ADULT NURSE NOTE - JUGULAR VENOUS DISTENTION
February 13, 2018     Patient: Carlotta Sood   YOB: 1965   Date of Visit: 2/13/2018       To Whom it May Concern:    Carlotta Sood is under my professional care  She was seen in my office on 2/13/2018  She may return to work on 2/19/18  If you have any questions or concerns, please don't hesitate to call           Sincerely,          ARTEM Rodriguez        CC: No Recipients absent

## 2024-04-22 NOTE — CONSULT NOTE ADULT - SUBJECTIVE AND OBJECTIVE BOX
Patient is a 58y old  Female who presents with a chief complaint of chest pain   rule out ACS (22 Apr 2024 04:22)    ________________________________  MARGRET MENDIETA is a 58y year old Female with a past medical history of heumatoid arthritis, hiatal hernia, COPD (currently vapes) and HLD presenting to the ED on 4/22/24 for chest pain. Reports she woke up at 1 am the day of admission with palpitations accompanied by a dull ache that started under her L armpit and radiated toward her chest and toward both jaws. Describes the pain as dull and achy. At its worst she rates it a 5/10. The pain is not reproducible and does not change with changes in posture or breathing. She reports that over the past 2 weeks she has been having intermittent chest pain occurring at rest and during activity. She's unable to recall how long each episode would last but notes she would take an aspirin that would provide some relief. She mentions that her mother passed away at the age of 38 due to cardiac issues. Reports she has undergone cardiac work-up through Dr. Jasmine's office but unable to recall exactly what.  She leads an active lifestyle as she works 7 days a week as a . She does not feel there has been any change to her exercise tolerance. She is able to walk on flat surfaces but walking on an incline is difficult. She quit smoking in 2016. Started smoking at the age of 14 (1ppd). Traveled to [place] 1 month ago. Denies any recent contact with sick individuals.         PREVIOUS CARDIAC WORKUP:    Echocardiogram 10/23: EF normal.     Stress Test    Cardiac Catheterization  ________________________________  Review of systems: A 10 point review of system has been performed, and is negative except for what has been mentioned in the above history of present illness.     PAST MEDICAL & SURGICAL HISTORY:  Rheumatoid arthritis      PNA (pneumonia)      Crohn's disease      H/O osteoporosis      History of wrist fracture      H/O fracture of wrist        FAMILY HISTORY:  Family history of stroke (Father)    Family history of cardiovascular disease (Mother)         SOCIAL HISTORY: The patient denies any tobacco abuse, alcohol abuse or illicit drug use.    ALLERGIES:  No Known Allergies    Home Medications:  aspirin 81 mg oral tablet:  (22 Apr 2024 08:23)  atorvastatin 20 mg oral tablet: 1 tab(s) orally once a day (22 Apr 2024 08:23)  Humira 40 mg/0.8 mL subcutaneous kit: 40 milligram(s) subcutaneous once a week (22 Apr 2024 08:22)  Trelegy Ellipta 100 mcg-62.5 mcg-25 mcg/inh inhalation powder: 1 puff(s) inhaled once a day (22 Apr 2024 08:24)    MEDICATIONS  (STANDING):  aspirin  chewable 81 milliGRAM(s) Oral daily  atorvastatin 20 milliGRAM(s) Oral at bedtime  heparin   Injectable 5000 Unit(s) SubCutaneous every 12 hours    MEDICATIONS  (PRN):  acetaminophen     Tablet .. 650 milliGRAM(s) Oral every 6 hours PRN Mild Pain (1 - 3)  morphine  - Injectable 2 milliGRAM(s) IV Push every 4 hours PRN Severe Pain (7 - 10)  nitroglycerin     SubLingual 0.4 milliGRAM(s) SubLingual every 5 minutes PRN Chest Pain  ondansetron Injectable 4 milliGRAM(s) IV Push every 6 hours PRN Nausea and/or Vomiting    Vital Signs Last 24 Hrs  T(C): 37.1 (22 Apr 2024 09:26), Max: 37.1 (22 Apr 2024 09:26)  T(F): 98.8 (22 Apr 2024 09:26), Max: 98.8 (22 Apr 2024 09:26)  HR: 64 (22 Apr 2024 09:26) (51 - 66)  BP: 100/48 (22 Apr 2024 09:26) (97/63 - 124/74)  BP(mean): 74 (22 Apr 2024 05:34) (74 - 89)  RR: 18 (22 Apr 2024 09:26) (16 - 19)  SpO2: 92% (22 Apr 2024 09:26) (92% - 100%)    Parameters below as of 22 Apr 2024 09:26  Patient On (Oxygen Delivery Method): room air      I&O's Summary    ________________________________  GENERAL APPEARANCE:  No acute distress  HEAD: normocephalic, atraumatic  NECK: supple, no jugular venous distention, no carotid bruit    HEART: Regular rate and rhythm, S1, S2 normal, 1/6 murmur    CHEST:  No anterior chest wall tenderness    LUNGS:  Clear to auscultation, without any wheezing, rhonchi or rales    ABDOMEN: soft, nontender, nondistended, with positive bowel sounds appreciated  EXTREMITIES: no edema.   NEURO: Alert and oriented x3  PSYC:  Normal affect  SKIN:  Dry  ________________________________   TELEMETRY:    ECG:    LABS:                        12.8   7.10  )-----------( 321      ( 22 Apr 2024 08:24 )             39.6             04-22    137  |  110<H>  |  15  ----------------------------<  112<H>  4.5   |  24  |  0.70    Ca    9.0      22 Apr 2024 08:24  Phos  3.0     04-22  Mg     2.3     04-22    TPro  7.1  /  Alb  3.3  /  TBili  0.2  /  DBili  x   /  AST  15  /  ALT  18  /  AlkPhos  92  04-22      LIVER FUNCTIONS - ( 22 Apr 2024 02:28 )  Alb: 3.3 g/dL / Pro: 7.1 gm/dL / ALK PHOS: 92 U/L / ALT: 18 U/L / AST: 15 U/L / GGT: x         PT/INR - ( 22 Apr 2024 02:28 )   PT: 10.2 sec;   INR: 0.90 ratio         PTT - ( 22 Apr 2024 02:28 )  PTT:34.4 sec  Urinalysis Basic - ( 22 Apr 2024 08:24 )    Color: x / Appearance: x / SG: x / pH: x  Gluc: 112 mg/dL / Ketone: x  / Bili: x / Urobili: x   Blood: x / Protein: x / Nitrite: x   Leuk Esterase: x / RBC: x / WBC x   Sq Epi: x / Non Sq Epi: x / Bacteria: x      Pro BNP  -- 04-22 @ 02:28  D Dimer  <150 04-22 @ 02:28    PT/INR - ( 22 Apr 2024 02:28 )   PT: 10.2 sec;   INR: 0.90 ratio         PTT - ( 22 Apr 2024 02:28 )  PTT:34.4 sec  Urinalysis Basic - ( 22 Apr 2024 08:24 )    Color: x / Appearance: x / SG: x / pH: x  Gluc: 112 mg/dL / Ketone: x  / Bili: x / Urobili: x   Blood: x / Protein: x / Nitrite: x   Leuk Esterase: x / RBC: x / WBC x   Sq Epi: x / Non Sq Epi: x / Bacteria: x    ________________________________    RADIOLOGY & ADDITIONAL STUDIES:   ________________________________    ASSESSMENT:      PLAN:  In summary, this is a 58y Female with a past medical history of       ____________________________________________  (Dragon Dictation software used). Thank you for allowing me to participate in the care of your patient. Please contact me should any questions arise.    JC Albarado DO, [facility]  Office: [phone]    Patient is a 58y old  Female who presents with a chief complaint of chest pain        ________________________________  MARGRET MENDIETA is a 58y year old Female with a past medical history of nonobstructive coronary disease by cardiac catheterization in 2007, hypertension, hyperlipidemia, history of vaping and COPD, hiatal hernia, rheumatoid arthritis, and obesity.  States she underwent a stress test which showed no ischemia.  Echocardiogram at that time showed normal LVEF.  See results below.    She now presents for evaluation of vague left-sided chest pain, described as a mild pressure, and palpitations occurred last night lasting less than 10 minutes.  Given palpitations she came to the ER for further evaluation.  Thus far she would like for ACS.  EKG showed no acute abnormalities.  No events on telemetry.  X-ray showed no abnormalities.    Schedule to have have endoscopy.           PREVIOUS CARDIAC WORKUP:    Echocardiogram 10/23:   --There is no left atrial dilatation (LA volume index 32 ml/m²).  --LV global wall motion is normal.  --LV ejection fraction (62 %) is normal.  --The global LV longitudinal strain using the speckle tracking technology is -21.9 %.  --There is mild mitral valve thickening. There is mild mitral regurgitation.  --There is mild to moderate tricuspid regurgitation.  --There is mild pulmonic regurgitation.  --The right atrial pressure is normal (0 - 5 mm Hg). There is no pulmonary hypertension.  --There is no pericardial effusion.      Stress Test 11/6/2023  •  There is a small to medium size defect, of mild to moderate severity, in the mid anterior and apical anterior wall(s), that is worse on rest images, suggestive of breast attenuation artifact.  •  Overall impression: Normal.  •  Left ventricular perfusion is probably normal.  •  No ECG evidence of ischemia after administration of IV regadenoson.  •  SPECT results are limited by artifact and show no definite ischemia or infarct.  •  Gated wall motion analysis shows normal wall motion.  •  Rest gated analysis showed normal left ventricular function with normal left ventricle size.  •  Post stress analysis showed normal left ventricular function with normal left ventricle size.  •  Rest myocardial perfusion gated SPECT imaging was performed, showing a left ventricular ejection fraction of 75 %,  •  Post stress resting myocardial perfusion gated SPECT imaging was performed, showing a left ventricular ejection fraction of 75 %,      Cardiac Catheterization  ________________________________  Review of systems: A 10 point review of system has been performed, and is negative except for what has been mentioned in the above history of present illness.     PAST MEDICAL & SURGICAL HISTORY:  Rheumatoid arthritis      PNA (pneumonia)      Crohn's disease      H/O osteoporosis      History of wrist fracture      H/O fracture of wrist        FAMILY HISTORY:  Family history of stroke (Father)    Family history of cardiovascular disease (Mother)         SOCIAL HISTORY: The patient denies any tobacco abuse, alcohol abuse or illicit drug use.    ALLERGIES:  No Known Allergies    Home Medications:  aspirin 81 mg oral tablet:  (22 Apr 2024 08:23)  atorvastatin 20 mg oral tablet: 1 tab(s) orally once a day (22 Apr 2024 08:23)  Humira 40 mg/0.8 mL subcutaneous kit: 40 milligram(s) subcutaneous once a week (22 Apr 2024 08:22)  Trelegy Ellipta 100 mcg-62.5 mcg-25 mcg/inh inhalation powder: 1 puff(s) inhaled once a day (22 Apr 2024 08:24)    MEDICATIONS  (STANDING):  aspirin  chewable 81 milliGRAM(s) Oral daily  atorvastatin 20 milliGRAM(s) Oral at bedtime  heparin   Injectable 5000 Unit(s) SubCutaneous every 12 hours    MEDICATIONS  (PRN):  acetaminophen     Tablet .. 650 milliGRAM(s) Oral every 6 hours PRN Mild Pain (1 - 3)  morphine  - Injectable 2 milliGRAM(s) IV Push every 4 hours PRN Severe Pain (7 - 10)  nitroglycerin     SubLingual 0.4 milliGRAM(s) SubLingual every 5 minutes PRN Chest Pain  ondansetron Injectable 4 milliGRAM(s) IV Push every 6 hours PRN Nausea and/or Vomiting    Vital Signs Last 24 Hrs  T(C): 37.1 (22 Apr 2024 09:26), Max: 37.1 (22 Apr 2024 09:26)  T(F): 98.8 (22 Apr 2024 09:26), Max: 98.8 (22 Apr 2024 09:26)  HR: 64 (22 Apr 2024 09:26) (51 - 66)  BP: 100/48 (22 Apr 2024 09:26) (97/63 - 124/74)  BP(mean): 74 (22 Apr 2024 05:34) (74 - 89)  RR: 18 (22 Apr 2024 09:26) (16 - 19)  SpO2: 92% (22 Apr 2024 09:26) (92% - 100%)    Parameters below as of 22 Apr 2024 09:26  Patient On (Oxygen Delivery Method): room air      I&O's Summary    ________________________________  GENERAL APPEARANCE:  No acute distress  HEAD: normocephalic, atraumatic  NECK: supple, no jugular venous distention, no carotid bruit    HEART: Regular rate and rhythm, S1, S2 normal, 1/6 murmur    CHEST:  No anterior chest wall tenderness    LUNGS:  Clear to auscultation, without any wheezing, rhonchi or rales    ABDOMEN: soft, nontender, nondistended, with positive bowel sounds appreciated  EXTREMITIES: no edema.   NEURO: Alert and oriented x3  PSYC:  Normal affect  SKIN:  Dry  ________________________________   TELEMETRY: Sinus rhythm    ECG: Sinus rhythm, no significant ST-T wave changes    LABS:                        12.8   7.10  )-----------( 321      ( 22 Apr 2024 08:24 )             39.6             04-22    137  |  110<H>  |  15  ----------------------------<  112<H>  4.5   |  24  |  0.70    Ca    9.0      22 Apr 2024 08:24  Phos  3.0     04-22  Mg     2.3     04-22    TPro  7.1  /  Alb  3.3  /  TBili  0.2  /  DBili  x   /  AST  15  /  ALT  18  /  AlkPhos  92  04-22      LIVER FUNCTIONS - ( 22 Apr 2024 02:28 )  Alb: 3.3 g/dL / Pro: 7.1 gm/dL / ALK PHOS: 92 U/L / ALT: 18 U/L / AST: 15 U/L / GGT: x         PT/INR - ( 22 Apr 2024 02:28 )   PT: 10.2 sec;   INR: 0.90 ratio         PTT - ( 22 Apr 2024 02:28 )  PTT:34.4 sec  Urinalysis Basic - ( 22 Apr 2024 08:24 )    Color: x / Appearance: x / SG: x / pH: x  Gluc: 112 mg/dL / Ketone: x  / Bili: x / Urobili: x   Blood: x / Protein: x / Nitrite: x   Leuk Esterase: x / RBC: x / WBC x   Sq Epi: x / Non Sq Epi: x / Bacteria: x      Pro BNP  -- 04-22 @ 02:28  D Dimer  <150 04-22 @ 02:28    PT/INR - ( 22 Apr 2024 02:28 )   PT: 10.2 sec;   INR: 0.90 ratio         PTT - ( 22 Apr 2024 02:28 )  PTT:34.4 sec  Urinalysis Basic - ( 22 Apr 2024 08:24 )    Color: x / Appearance: x / SG: x / pH: x  Gluc: 112 mg/dL / Ketone: x  / Bili: x / Urobili: x   Blood: x / Protein: x / Nitrite: x   Leuk Esterase: x / RBC: x / WBC x   Sq Epi: x / Non Sq Epi: x / Bacteria: x    ________________________________    RADIOLOGY & ADDITIONAL STUDIES: Chest x-ray clear  ________________________________    ASSESSMENT:  Chest pain and palpitations  Nonobstructive CAD on prior coronary angiogram  Hypertension  Hyperlipidemia  Rheumatoid arthritis  COPD and vaping  Hiatal hernia    PLAN:  In summary, this is a 58y Female with a past medical history of nonobstructive coronary disease, hypertension, hyperlipidemia, admitted for atypical chest pain.  Ruled out for ACS.  No arrhythmias on telemetry.  Palpitations could be secondary to bouts of PAT.  Previous ischemic workup negative.  If recurrence of symptoms, recommend outpatient cardiac CTA.  Given no evidence of ACS, patient can be discharged with outpatient follow-up.  Medical compliance urged.  Has not been compliant with aspirin and statin in the past.  Vaping cessation also discussed.        ____________________________________________  (Dragon Dictation software used). Thank you for allowing me to participate in the care of your patient. Please contact me should any questions arise.    JC Albarado DO, [facility]  Office: [phone]

## 2024-04-22 NOTE — H&P ADULT - NSHPREVIEWOFSYSTEMS_GEN_ALL_CORE
Patient is a 57 y/o F with a PMH of rheumatoid arthritis, hiatal hernia, COPD (currently vapes) and HLD presenting to the ED on 4/22/24 for chest pain. Reports she woke up at 1 am the day of admission with palpitations accompanied by a dull ache that started under her L armpit and radiated toward her chest and toward both jaws. Describes the pain as dull and achy. At its worst she rates it a 5/10. The pain is not reproducible and does not change with changes in posture or breathing. She reports that over the past 2 weeks she has been having intermittent chest pain occurring at rest and during activity. She's unable to recall how long each episode would last but notes she would take an aspirin that would provide some relief. She mentions that her mother passed away at the age of 38 due to cardiac issues. Reports she has undergone cardiac work-up through Dr. Jasmine's office but unable to recall exactly what.  She leads an active lifestyle as she works 7 days a week as a . She does not feel there has been any change to her exercise tolerance. She is able to walk on flat surfaces but walking on an incline is difficult. She quit smoking in 2016. Started smoking at the age of 14 (1ppd). Traveled to [place] 1 month ago. Denies any recent contact with sick individuals. Currently, she denies nausea, vomiting, headache, fevers, chills, abdominal pain, LE swelling.     Upon arrival to the ED, vitals were /74 HR 89 RR 19 Spo2 100% RA and T97.6F. EKG showed normal sinus rhythm. She received aspirin 325 mg x 1 and IV Toradol 30 mg x 1.

## 2024-04-22 NOTE — ED ADULT NURSE NOTE - OBJECTIVE STATEMENT
pt. came in from home with c/o left chest pain radiating to her left shoulder, she had this episodes this past few weeks but tonight it get worse, she took 2 baby aspirin PTA, known with Cardiac prob, alert and oriented, denies SOB, fever and dizziness, safety maintained applied.

## 2024-04-22 NOTE — ED ADULT NURSE NOTE - NSFALLHARMRISKINTERV_ED_ALL_ED

## 2024-04-22 NOTE — DISCHARGE NOTE PROVIDER - HOSPITAL COURSE
59 y/o F with a PMH of rheumatoid arthritis, hiatal hernia, COPD (currently vapes) and HLD presenting to the ED on 4/22/24 for chest pain. Reports she woke up at 1 am the day of admission with palpitations accompanied by a dull ache that started under her L armpit and radiated toward her chest and toward both jaws. Describes the pain as dull and achy. At its worst she rates it a 5/10. The pain is not reproducible and does not change with changes in posture or breathing. She reports that over the past 2 weeks she has been having intermittent chest pain occurring at rest and during activity. She's unable to recall how long each episode would last but notes she would take an aspirin that would provide some relief. She mentions that her mother passed away at the age of 38 due to cardiac issues. Reports she has undergone cardiac work-up through Dr. Jasmine's office but unable to recall exactly what.  She leads an active lifestyle as she works 7 days a week as a . She does not feel there has been any change to her exercise tolerance. She is able to walk on flat surfaces but walking on an incline is difficult. She quit smoking in 2016. Started smoking at the age of 14 (1ppd). Traveled to [place] 1 month ago. Denies any recent contact with sick individuals. Currently, she denies nausea, vomiting, headache, fevers, chills, abdominal pain, LE swelling.   Upon arrival to the ED, vitals were /74 HR 89 RR 19 Spo2 100% RA and T97.6F. EKG showed normal sinus rhythm. She received aspirin 325 mg x 1 and IV Toradol 30 mg x 1.    #Chest pain  #Rule out ACS  - Patient presenting with chest pain with a moderate HEART score - has several risk factors for atherosclerotic heart disease - age, obesity, smoking and HLD   - EKG upon admission - NSR   - Troponin upon admission 4.75. follow-up tropoinin levels x 2   - ECHO in the AM   - F/u Hgb A1c and lipid panel   - Nitroglycerin PRN for chest pain   - Continue aspirin and statin   - Consider starting beta blocker   - Cardiology consult   - If nature of chest pain changes please order an EKG     #HLD   - Continue atorvastatin 20 mg     #COPD   - Stable   - Only takes Trellega/Ellipta PRN   - Congratulated on smoking cessation   - Counseled on current vaping     #DVT ppx   - Heparin SubQ    #Code Status   - FULL CODE    EKG showed no acute abnormalities.  No events on telemetry.  X-ray showed no abnormalities.    In summary, this is a 58y Female with a past medical history of nonobstructive coronary disease, hypertension, hyperlipidemia, admitted for atypical chest pain.  Ruled out for ACS.  No arrhythmias on telemetry.  Palpitations could be secondary to bouts of PAT.  Previous ischemic workup negative.  If recurrence of symptoms, recommend outpatient cardiac CTA.  Given no evidence of ACS, patient can be discharged with outpatient follow-up.  Medical compliance urged.  Has not been compliant with aspirin and statin in the past.  Vaping cessation also discussed.     59 y/o F with a PMH of rheumatoid arthritis, hiatal hernia, COPD (currently vapes) and HLD presenting to the ED on 4/22/24 for chest pain. Reports she woke up at 1 am the day of admission with palpitations accompanied by a dull ache that started under her L armpit and radiated toward her chest and toward both jaws. Describes the pain as dull and achy. At its worst she rates it a 5/10. The pain is not reproducible and does not change with changes in posture or breathing. She reports that over the past 2 weeks she has been having intermittent chest pain occurring at rest and during activity. She's unable to recall how long each episode would last but notes she would take an aspirin that would provide some relief. She mentions that her mother passed away at the age of 38 due to cardiac issues. Reports she has undergone cardiac work-up through Dr. Jasmine's office but unable to recall exactly what.  She leads an active lifestyle as she works 7 days a week as a . She does not feel there has been any change to her exercise tolerance. She is able to walk on flat surfaces but walking on an incline is difficult. She quit smoking in 2016. Started smoking at the age of 14 (1ppd). Traveled to [place] 1 month ago. Denies any recent contact with sick individuals. Currently, she denies nausea, vomiting, headache, fevers, chills, abdominal pain, LE swelling.   Upon arrival to the ED, vitals were /74 HR 89 RR 19 Spo2 100% RA and T97.6F. EKG showed normal sinus rhythm. She received aspirin 325 mg x 1 and IV Toradol 30 mg x 1.  Patient presenting with chest pain with a moderate HEART score - has several risk factors for atherosclerotic heart disease - age, obesity, smoking and HLD . Admitted to the hospital for ACS rule out. Troponins negative. EKG without change from previous and echo unremarkable. Seen by Cardiology. Continue aspirin and statin. No arrhythmias on telemetry. Palpitations could be secondary to bouts of PAT. If recurrence of symptoms, recommend outpatient cardiac CTA.  Given no evidence of ACS, patient can be discharged with outpatient follow-up.  Medical compliance urged.  Has not been compliant with aspirin and statin in the past. Vaping cessation discussed with be discharged with nicotine path. Will also DC with Protonix for possible GERD. Patient with outpatient GI follow up scheduled. Patient to follow up with PCP and Cardiology in 1-2 weeks. Advised to return to the hospital with any worsening chest pain shortness of breath fevers chills nausea vomiting diarrhea.        59 y/o F with a PMH of rheumatoid arthritis, hiatal hernia, COPD (currently vapes) and HLD presenting to the ED on 4/22/24 for chest pain. Reports she woke up at 1 am the day of admission with palpitations accompanied by a dull ache that started under her L armpit and radiated toward her chest and toward both jaws. Describes the pain as dull and achy. At its worst she rates it a 5/10. The pain is not reproducible and does not change with changes in posture or breathing. She reports that over the past 2 weeks she has been having intermittent chest pain occurring at rest and during activity. She's unable to recall how long each episode would last but notes she would take an aspirin that would provide some relief. She mentions that her mother passed away at the age of 38 due to cardiac issues. Reports she has undergone cardiac work-up through Dr. Jasmine's office but unable to recall exactly what.  She leads an active lifestyle as she works 7 days a week as a . She does not feel there has been any change to her exercise tolerance. She is able to walk on flat surfaces but walking on an incline is difficult. She quit smoking in 2016. Started smoking at the age of 14 (1ppd). Traveled to [place] 1 month ago. Denies any recent contact with sick individuals. Currently, she denies nausea, vomiting, headache, fevers, chills, abdominal pain, LE swelling.   Upon arrival to the ED, vitals were /74 HR 89 RR 19 Spo2 100% RA and T97.6F. EKG showed normal sinus rhythm. She received aspirin 325 mg x 1 and IV Toradol 30 mg x 1.  Patient presenting with chest pain with a moderate HEART score - has several risk factors for atherosclerotic heart disease - age, obesity, smoking and HLD . Admitted to the hospital for ACS rule out. Troponins negative. EKG without change from previous and echo unremarkable. Seen by Cardiology. Continue aspirin and statin. No arrhythmias on telemetry. Palpitations could be secondary to bouts of PAT. If recurrence of symptoms, recommend outpatient cardiac CTA.  Given no evidence of ACS, patient can be discharged with outpatient follow-up.  Medical compliance urged.  Has not been compliant with aspirin and statin in the past. Vaping cessation discussed with be discharged with nicotine path. Will also DC with Protonix for possible GERD. Patient with outpatient GI follow up scheduled. Patient to follow up with PCP and Cardiology in 1-2 weeks. Advised to return to the hospital with any worsening chest pain shortness of breath fevers chills nausea vomiting diarrhea.

## 2024-04-22 NOTE — DISCHARGE NOTE NURSING/CASE MANAGEMENT/SOCIAL WORK - PATIENT PORTAL LINK FT
You can access the FollowMyHealth Patient Portal offered by [facility] by registering at the following website: [email or web address]. By joining [facility]’s FollowMyHealth portal, you will also be able to view your health information using other applications (apps) compatible with our system.

## 2024-04-22 NOTE — ED PROVIDER NOTE - OBJECTIVE STATEMENT
Pt. is a 57 yo F with PMHx of hyperlipidemia, smoking and vaping history, rheumatoid arthritis, Crohns disease, pneumonia presents with 2 weeks of left sided chest pain.  Pain is in left upper chest and slightly under left armpit.  Pain sometimes radiates on left side of neck and left jaw.  Patient states pain is dull and achy.  Cannot be reproduced with palpation or movement.  Denies fever, chills, sore throat, coughing.  +vape use, denies pain with breathing, leg pain or upper back pain.  +recent trip to [place].  Patient states she awoke from sleep with heart racing and the pain and was worried.  Mom passed age 38- cardiac related, Dad- hx of stroke

## 2024-04-22 NOTE — ED ADULT NURSE NOTE - NSICDXFAMILYHX_GEN_ALL_CORE_FT
FAMILY HISTORY:  Father  Still living? Unknown  Family history of stroke, Age at diagnosis: Age Unknown    Mother  Still living? No  Family history of cardiovascular disease, Age at diagnosis: Age Unknown

## 2024-04-22 NOTE — DISCHARGE NOTE PROVIDER - NSDCMRMEDTOKEN_GEN_ALL_CORE_FT
aspirin 81 mg oral tablet: 81 milligram(s) orally once a day  atorvastatin 20 mg oral tablet: 1 tab(s) orally once a day  Humira 40 mg/0.8 mL subcutaneous kit: 40 milligram(s) subcutaneous once a week  nicotine 7 mg/24 hr transdermal film, extended release: 1 patch transdermally  Protonix 40 mg oral delayed release tablet: 1 tab(s) orally once a day  Trelegy Ellipta 100 mcg-62.5 mcg-25 mcg/inh inhalation powder: 1 puff(s) inhaled once a day

## 2024-04-22 NOTE — H&P ADULT - NSHPPHYSICALEXAM_GEN_ALL_CORE
GENERAL: NAD, lying in bed comfortably  HEAD:  Atraumatic, Normocephalic  EYES: EOMI  NECK: Supple  CHEST/LUNG: Clear to auscultation bilaterally  HEART: Regular rate and rhythm  ABDOMEN:Soft, Nontender  EXTREMITIES:  2+ Peripheral Pulses  NERVOUS SYSTEM:  Alert & Oriented X3, speech clear  SKIN: No rashes or lesions

## 2024-04-22 NOTE — PATIENT PROFILE ADULT - NSTOBACCO TYPE_GEN_A_CORE_RD
Vaping/e-Cigarettes
I will START or STAY ON the medications listed below when I get home from the hospital:    ibuprofen 600 mg oral tablet  -- 1 tab(s) by mouth every 6 hours, As Needed  -- Indication: For pain    acetaminophen 325 mg oral tablet  -- 3 tab(s) by mouth every 6 hours, As Needed  -- Indication: For pain    dibucaine 1% topical ointment  -- 1 application on skin every 4 hours, As needed, Tenderness of the episiotomy site  -- Indication: For Normal vaginal delivery    Prenatal Multivitamins with Folic Acid 1 mg oral tablet  -- 1 tab(s) by mouth once a day  -- Indication: For vitamins

## 2024-04-22 NOTE — DISCHARGE NOTE PROVIDER - NSDCFUSCHEDAPPT_GEN_ALL_CORE_FT
[facility]  FAMILYMED [address]  Scheduled Appointment: 04/23/2024    Vahid Mayes  [facility]  HNT PreAdmits  Scheduled Appointment: 04/25/2024

## 2024-04-22 NOTE — ED PROVIDER NOTE - CLINICAL SUMMARY MEDICAL DECISION MAKING FREE TEXT BOX
59 yo F with heart score of 5; EKG with nonspecific repolarization disturbance, CXR normal. Labs with negative d dimer and troponin.  Will give aspirin and admit to r/o ACS.  Will consult cardiologist Dr. Jasmine.  Discussed with hospitalist attending Dr. Sherman.

## 2024-04-22 NOTE — DISCHARGE NOTE PROVIDER - ATTENDING DISCHARGE PHYSICAL EXAMINATION:
VITALS:  T(F): 98.8 (04-22-24 @ 09:26), Max: 98.8 (04-22-24 @ 09:26)  HR: 64 (04-22-24 @ 09:26) (51 - 66)  BP: 100/48 (04-22-24 @ 09:26) (97/63 - 124/74)  RR: 18 (04-22-24 @ 09:26) (16 - 19)  SpO2: 92% (04-22-24 @ 09:26) (92% - 100%)  Wt(kg): --    I&O's Summary      CAPILLARY BLOOD GLUCOSE          PHYSICAL EXAM:  Gen: No acute distress   HEENT:  pupils equal and reactive, EOMI,   NECK:   supple, no carotid bruits, No JVD  CV:  +S1, +S2, regular rate rhythm, no murmurs or rubs  RESP:   lungs clear to auscultation bilaterally, no wheezing, rales, rhonchi, good air entry bilaterally  GI:  abdomen soft, non-tender, non-distended, normal BS, no bruits, no abdominal masses, no palpable masses  MSK:   normal muscle tone, no atrophy, no rigidity, no contractions  EXT:  no clubbing, no cyanosis, no edema, no calf pain, swelling or erythema  VASCULAR:  pulses equal and symmetric in the upper and lower extremities  NEURO:  AAOX3, no focal neurological deficits, follows all commands, able to move extremities spontaneously  SKIN:  no ulcers, lesions or rashes

## 2024-04-22 NOTE — DISCHARGE NOTE NURSING/CASE MANAGEMENT/SOCIAL WORK - TOBACCO CESSATION EDUCATION/COUNSELLING(PROVIDED IF TOBACCO USED IN THE PAST 30 DAYS- CORE MEASURE SITES)
Offered and patient declined [place] Counseling: I discussed with the patient the risks of tildrakizumab including but not limited to immunosuppression, malignancy, posterior leukoencephalopathy syndrome, and serious infections.  The patient understands that monitoring is required including a PPD at baseline and must alert us or the primary physician if symptoms of infection or other concerning signs are noted.

## 2024-04-22 NOTE — DISCHARGE NOTE PROVIDER - PROVIDER TOKENS
PROVIDER:[TOKEN:[[id number]],FOLLOWUP:[1 week]],PROVIDER:[TOKEN:[[id number]],FOLLOWUP:[1 week]]

## 2024-04-22 NOTE — H&P ADULT - ASSESSMENT
#Chest pain  #Rule out ACS  - Patient presenting with chest pain with a moderate HEART score - has several risk factors for atherosclerotic heart disease - age, obesity, smoking and HLD   - EKG upon admission - NSR   - Troponin upon admission 4.75. follow-up tropoinin levels x 2   - ECHO in the AM   - F/u Hgb A1c and lipid panel   - Nitroglycerin PRN for chest pain   - Continue aspirin and statin   - Consider starting beta blocker   - Cardiology consult   - If nature of chest pain changes please order an EKG     #HLD   - Continue atorvastatin 20 mg     #COPD   - Stable   - Only takes Trellega/Ellipta PRN   - Congratulated on smoking cessation   - Counseled on current vaping     #DVT ppx   - Heparin SubQ    #Code Status   - FULL CODE

## 2024-04-22 NOTE — PATIENT PROFILE ADULT - FALL HARM RISK - UNIVERSAL INTERVENTIONS
Bed in lowest position, wheels locked, appropriate side rails in place/Call bell, personal items and telephone in reach/Instruct patient to call for assistance before getting out of bed or chair/Non-slip footwear when patient is out of bed/[place] to call system/Physically safe environment - no spills, clutter or unnecessary equipment/Purposeful Proactive Rounding/Room/bathroom lighting operational, light cord in reach

## 2024-04-22 NOTE — DISCHARGE NOTE PROVIDER - CARE PROVIDER_API CALL
Monika Jasmine  Cardiology  [address] [facility] [place] [address]  Phone: [phone]  Fax: [phone]  Follow Up Time: 1 week    Vahid Mayes  Gastroenterology  [address] [place] [address], [address]  [place] [address]  Phone: [phone]  Fax: [phone]  Follow Up Time: 1 week

## 2024-04-22 NOTE — ED ADULT TRIAGE NOTE - CHIEF COMPLAINT QUOTE
complains of sharp chest pain radiating to left arm and to left side jaw tonight. reports intermittent chest discomfort over past 2 weeks, worse tonight history of heart disease.

## 2024-04-22 NOTE — H&P ADULT - NSHPOUTPATIENTPROVIDERS_GEN_ALL_CORE
PCP: Dr. Mirlande Flores   Cardio: Dr. Jasmine   Pulm: Dr. Schwartz   GI: Dr. Mayes   Rheum: Dr. Saxena

## 2024-04-22 NOTE — ED ADULT NURSE REASSESSMENT NOTE - NS ED NURSE REASSESS COMMENT FT1
informed Dr. Kemp if I need to add-on Beta HCG for the pt. because pt. will received Toradol, as per her no need, and as per the pt. she is on her menopause stage.

## 2024-04-22 NOTE — DISCHARGE NOTE PROVIDER - NSDCCPCAREPLAN_GEN_ALL_CORE_FT
PRINCIPAL DISCHARGE DIAGNOSIS  Diagnosis: Chest pain  Assessment and Plan of Treatment: WHAT IS A HEART ATTACK? A heart attack, also known as a myocardial infarction (MI) is a condition that occurs when your heart does not get enough oxygen causing damage to your heart muscle. This can cause permanent damage if not treated right away and lead to heart failure.  THINGS TO DO: (1) Eat heart healthy foods like fruits, vegetables, and whole grains (2) Limit dietary sodium to less than 2,300mg per day (3) Avoid nicotine products – smoking can cause further damage to your heart (4) Stay active with exercise (5) Maintain a healthy weight (6) Manage stress – stress can increase your risk of another heart attack (7) Monitor your blood pressure – high blood pressure can increase your risk for another heart attack  MONITOR THESE SIGNS AND SYMPTOMS: (1) Chest pain or pressure (2) Shortness of breath (3) Nausea or Vomiting (4) Lightheadedness. If you experience any of these, DO alert your primary care provider, or return to the Emergency Department if you feel very sick.

## 2024-04-23 ENCOUNTER — LABORATORY RESULT (OUTPATIENT)
Age: 59
End: 2024-04-23

## 2024-04-23 ENCOUNTER — APPOINTMENT (OUTPATIENT)
Dept: FAMILY MEDICINE | Facility: CLINIC | Age: 59
End: 2024-04-23
Payer: COMMERCIAL

## 2024-04-23 VITALS
BODY MASS INDEX: 36.96 KG/M2 | WEIGHT: 230 LBS | SYSTOLIC BLOOD PRESSURE: 128 MMHG | DIASTOLIC BLOOD PRESSURE: 84 MMHG | OXYGEN SATURATION: 97 % | TEMPERATURE: 97.3 F | HEART RATE: 73 BPM | HEIGHT: 66 IN

## 2024-04-23 DIAGNOSIS — M06.9 RHEUMATOID ARTHRITIS, UNSPECIFIED: ICD-10-CM

## 2024-04-23 DIAGNOSIS — Z87.891 PERSONAL HISTORY OF NICOTINE DEPENDENCE: ICD-10-CM

## 2024-04-23 DIAGNOSIS — J44.9 CHRONIC OBSTRUCTIVE PULMONARY DISEASE, UNSPECIFIED: ICD-10-CM

## 2024-04-23 DIAGNOSIS — R73.03 PREDIABETES.: ICD-10-CM

## 2024-04-23 DIAGNOSIS — F41.9 ANXIETY DISORDER, UNSPECIFIED: ICD-10-CM

## 2024-04-23 DIAGNOSIS — M54.9 DORSALGIA, UNSPECIFIED: ICD-10-CM

## 2024-04-23 PROCEDURE — 36415 COLL VENOUS BLD VENIPUNCTURE: CPT

## 2024-04-23 PROCEDURE — 99214 OFFICE O/P EST MOD 30 MIN: CPT

## 2024-04-23 RX ORDER — SODIUM SULFATE, POTASSIUM SULFATE, MAGNESIUM SULFATE 17.5; 3.13; 1.6 G/ML; G/ML; G/ML
17.5-3.13-1.6 SOLUTION, CONCENTRATE ORAL
Qty: 354 | Refills: 0 | Status: COMPLETED | COMMUNITY
Start: 2022-07-21 | End: 2024-04-23

## 2024-04-23 RX ORDER — ETANERCEPT 25 MG/.5ML
25 SOLUTION SUBCUTANEOUS
Refills: 0 | Status: ACTIVE | COMMUNITY

## 2024-04-23 RX ORDER — ADALIMUMAB 20MG/0.4ML
KIT SUBCUTANEOUS
Refills: 0 | Status: COMPLETED | COMMUNITY
End: 2024-04-23

## 2024-04-23 RX ORDER — METFORMIN ER 500 MG 500 MG/1
500 TABLET ORAL DAILY
Qty: 90 | Refills: 3 | Status: COMPLETED | COMMUNITY
Start: 2023-06-21 | End: 2024-04-23

## 2024-04-23 RX ORDER — ADALIMUMAB 40MG/0.4ML
40 KIT SUBCUTANEOUS
Qty: 2 | Refills: 0 | Status: COMPLETED | COMMUNITY
Start: 2020-12-16 | End: 2024-04-23

## 2024-04-23 NOTE — REVIEW OF SYSTEMS
[Heartburn] : heartburn [Negative] : Neurological [FreeTextEntry5] : see HPI [FreeTextEntry9] : lower back pain

## 2024-04-23 NOTE — PHYSICAL EXAM
[Normal] : affect was normal and insight and judgment were intact [de-identified] : mild tenderness with palpation over left chest

## 2024-04-23 NOTE — HISTORY OF PRESENT ILLNESS
[FreeTextEntry8] : - 2 weeks of dull left chest/shoulder pain - 2 nights ago, woke up with pain radiating to left jaw - went to  ER - ruled out an MI, bloodwork WNL, EKG NSR - tried a heating pad which seems to have helped  - had a uti 2 weeks ago  - went to urgent care, rx'ed with 7 days of Macrobid, finished about 10 days ago  - today, woke up with back pain which she thinks is related to a UTI as that is her usual symptom  - wants to test for UTI  - denies dysuria and frequency which she usually does not have with a UTI  - stress has been high,  lost his job  - has RA, sees rheum, states that in the past when had inflammation had similar pains  - due for an appt  - sees cards for fam hx of CAD, last time last year  - still having this chest discomfort today  - not associated nausea, SOB, dizziness  - going for an endoscopy in 2 days, saw pre-cancerous cells on last one a year ago  - acid reflux feels like a different pain  - requesting wegovy, was unable to fill when was prescribed it before  - hx of COPD, not using steroid inh daily, rarely uses albuterol inh  - vapes low dose nicotine daily, was rx'ed wellbutrin in the past but didn't take it  - wants to try it now

## 2024-04-23 NOTE — PLAN
[FreeTextEntry1] : - wellbutrin for smoking and anxiety, discussed that can increase anxiety  - f/u with rheum and cards - if develop chest pain with SOB, nausea, dizziness - go to the ER  - trial heating pad, NSAIDs as needed for discomfort

## 2024-04-24 ENCOUNTER — RX CHANGE (OUTPATIENT)
Age: 59
End: 2024-04-24

## 2024-04-25 ENCOUNTER — TRANSCRIPTION ENCOUNTER (OUTPATIENT)
Age: 59
End: 2024-04-25

## 2024-04-25 ENCOUNTER — OUTPATIENT (OUTPATIENT)
Dept: OUTPATIENT SERVICES | Facility: HOSPITAL | Age: 59
LOS: 1 days | Discharge: HOME | End: 2024-04-25
Payer: COMMERCIAL

## 2024-04-25 VITALS
HEIGHT: 66 IN | WEIGHT: 229.94 LBS | SYSTOLIC BLOOD PRESSURE: 119 MMHG | TEMPERATURE: 98 F | OXYGEN SATURATION: 100 % | DIASTOLIC BLOOD PRESSURE: 86 MMHG | HEART RATE: 64 BPM | RESPIRATION RATE: 17 BRPM

## 2024-04-25 DIAGNOSIS — K31.A21 GASTRIC INTESTINAL METAPLASIA WITH LOW GRADE DYSPLASIA: ICD-10-CM

## 2024-04-25 DIAGNOSIS — Z87.81 PERSONAL HISTORY OF (HEALED) TRAUMATIC FRACTURE: Chronic | ICD-10-CM

## 2024-04-25 LAB
ANA PAT FLD IF-IMP: ABNORMAL
ANACR T: ABNORMAL
APPEARANCE: CLEAR
BACTERIA: NEGATIVE /HPF
BILIRUBIN URINE: NEGATIVE
BLOOD URINE: NEGATIVE
CAST: 1 /LPF
COLOR: YELLOW
CRP SERPL-MCNC: 6 MG/L
EPITHELIAL CELLS: 7 /HPF
ERYTHROCYTE [SEDIMENTATION RATE] IN BLOOD BY WESTERGREN METHOD: 39 MM/HR
GLUCOSE QUALITATIVE U: NEGATIVE MG/DL
KETONES URINE: NEGATIVE MG/DL
LEUKOCYTE ESTERASE URINE: ABNORMAL
MICROSCOPIC-UA: NORMAL
NITRITE URINE: NEGATIVE
PH URINE: 5.5
PROTEIN URINE: NEGATIVE MG/DL
RED BLOOD CELLS URINE: 1 /HPF
SPECIFIC GRAVITY URINE: 1.02
UROBILINOGEN URINE: 0.2 MG/DL
WHITE BLOOD CELLS URINE: 8 /HPF

## 2024-04-25 PROCEDURE — 88305 TISSUE EXAM BY PATHOLOGIST: CPT

## 2024-04-25 PROCEDURE — 88312 SPECIAL STAINS GROUP 1: CPT | Mod: 26

## 2024-04-25 PROCEDURE — 88312 SPECIAL STAINS GROUP 1: CPT

## 2024-04-25 PROCEDURE — 88313 SPECIAL STAINS GROUP 2: CPT

## 2024-04-25 PROCEDURE — 88305 TISSUE EXAM BY PATHOLOGIST: CPT | Mod: 26

## 2024-04-25 PROCEDURE — 88313 SPECIAL STAINS GROUP 2: CPT | Mod: 26

## 2024-04-25 RX ORDER — ASPIRIN/CALCIUM CARB/MAGNESIUM 324 MG
81 TABLET ORAL
Qty: 0 | Refills: 0 | DISCHARGE

## 2024-04-25 RX ORDER — ADALIMUMAB 40MG/0.8ML
40 KIT SUBCUTANEOUS
Refills: 0 | DISCHARGE

## 2024-04-25 RX ORDER — ETANERCEPT 25 MG
0 VIAL (EA) SUBCUTANEOUS
Refills: 0 | DISCHARGE

## 2024-04-25 RX ORDER — ATORVASTATIN CALCIUM 80 MG/1
1 TABLET, FILM COATED ORAL
Qty: 0 | Refills: 0 | DISCHARGE

## 2024-04-25 RX ORDER — FLUTICASONE FUROATE, UMECLIDINIUM BROMIDE AND VILANTEROL TRIFENATATE 200; 62.5; 25 UG/1; UG/1; UG/1
1 POWDER RESPIRATORY (INHALATION)
Refills: 0 | DISCHARGE

## 2024-04-25 NOTE — ASU PATIENT PROFILE, ADULT - FALL HARM RISK - UNIVERSAL INTERVENTIONS
Bed in lowest position, wheels locked, appropriate side rails in place/Call bell, personal items and telephone in reach/Instruct patient to call for assistance before getting out of bed or chair/Non-slip footwear when patient is out of bed/[place] to call system/Physically safe environment - no spills, clutter or unnecessary equipment/Purposeful Proactive Rounding/Room/bathroom lighting operational, light cord in reach

## 2024-04-26 ENCOUNTER — TRANSCRIPTION ENCOUNTER (OUTPATIENT)
Age: 59
End: 2024-04-26

## 2024-04-26 LAB — BACTERIA UR CULT: NORMAL

## 2024-04-26 RX ORDER — SEMAGLUTIDE 0.25 MG/.5ML
0.25 INJECTION, SOLUTION SUBCUTANEOUS
Qty: 1 | Refills: 2 | Status: DISCONTINUED | COMMUNITY
Start: 2023-09-15 | End: 2024-04-24

## 2024-04-29 LAB — SURGICAL PATHOLOGY STUDY: SIGNIFICANT CHANGE UP

## 2024-04-30 DIAGNOSIS — M06.9 RHEUMATOID ARTHRITIS, UNSPECIFIED: ICD-10-CM

## 2024-04-30 DIAGNOSIS — I25.10 ATHEROSCLEROTIC HEART DISEASE OF NATIVE CORONARY ARTERY WITHOUT ANGINA PECTORIS: ICD-10-CM

## 2024-04-30 DIAGNOSIS — K21.9 GASTRO-ESOPHAGEAL REFLUX DISEASE WITHOUT ESOPHAGITIS: ICD-10-CM

## 2024-04-30 DIAGNOSIS — Z91.148 PATIENT'S OTHER NONCOMPLIANCE WITH MEDICATION REGIMEN FOR OTHER REASON: ICD-10-CM

## 2024-04-30 DIAGNOSIS — Z79.82 LONG TERM (CURRENT) USE OF ASPIRIN: ICD-10-CM

## 2024-04-30 DIAGNOSIS — I08.1 RHEUMATIC DISORDERS OF BOTH MITRAL AND TRICUSPID VALVES: ICD-10-CM

## 2024-04-30 DIAGNOSIS — J44.9 CHRONIC OBSTRUCTIVE PULMONARY DISEASE, UNSPECIFIED: ICD-10-CM

## 2024-04-30 DIAGNOSIS — I47.19 OTHER SUPRAVENTRICULAR TACHYCARDIA: ICD-10-CM

## 2024-04-30 DIAGNOSIS — E66.9 OBESITY, UNSPECIFIED: ICD-10-CM

## 2024-04-30 DIAGNOSIS — K50.90 CROHN'S DISEASE, UNSPECIFIED, WITHOUT COMPLICATIONS: ICD-10-CM

## 2024-04-30 DIAGNOSIS — K44.9 DIAPHRAGMATIC HERNIA WITHOUT OBSTRUCTION OR GANGRENE: ICD-10-CM

## 2024-04-30 DIAGNOSIS — F17.290 NICOTINE DEPENDENCE, OTHER TOBACCO PRODUCT, UNCOMPLICATED: ICD-10-CM

## 2024-04-30 DIAGNOSIS — Z82.49 FAMILY HISTORY OF ISCHEMIC HEART DISEASE AND OTHER DISEASES OF THE CIRCULATORY SYSTEM: ICD-10-CM

## 2024-04-30 DIAGNOSIS — E78.5 HYPERLIPIDEMIA, UNSPECIFIED: ICD-10-CM

## 2024-05-01 DIAGNOSIS — M81.0 AGE-RELATED OSTEOPOROSIS WITHOUT CURRENT PATHOLOGICAL FRACTURE: ICD-10-CM

## 2024-05-01 DIAGNOSIS — K29.50 UNSPECIFIED CHRONIC GASTRITIS WITHOUT BLEEDING: ICD-10-CM

## 2024-05-01 DIAGNOSIS — E66.01 MORBID (SEVERE) OBESITY DUE TO EXCESS CALORIES: ICD-10-CM

## 2024-05-01 DIAGNOSIS — E78.5 HYPERLIPIDEMIA, UNSPECIFIED: ICD-10-CM

## 2024-05-01 DIAGNOSIS — R13.10 DYSPHAGIA, UNSPECIFIED: ICD-10-CM

## 2024-05-01 DIAGNOSIS — K22.89 OTHER SPECIFIED DISEASE OF ESOPHAGUS: ICD-10-CM

## 2024-05-01 DIAGNOSIS — M06.9 RHEUMATOID ARTHRITIS, UNSPECIFIED: ICD-10-CM

## 2024-05-01 DIAGNOSIS — K31.A21 GASTRIC INTESTINAL METAPLASIA WITH LOW GRADE DYSPLASIA: ICD-10-CM

## 2024-05-01 DIAGNOSIS — Z86.19 PERSONAL HISTORY OF OTHER INFECTIOUS AND PARASITIC DISEASES: ICD-10-CM

## 2024-05-20 ENCOUNTER — RX RENEWAL (OUTPATIENT)
Age: 59
End: 2024-05-20

## 2024-05-20 RX ORDER — ALBUTEROL SULFATE 90 UG/1
108 (90 BASE) INHALANT RESPIRATORY (INHALATION)
Qty: 1 | Refills: 0 | Status: ACTIVE | COMMUNITY
Start: 2022-07-29 | End: 1900-01-01

## 2024-06-19 ENCOUNTER — RX RENEWAL (OUTPATIENT)
Age: 59
End: 2024-06-19

## 2024-06-19 RX ORDER — BUPROPION HYDROCHLORIDE 150 MG/1
150 TABLET, FILM COATED, EXTENDED RELEASE ORAL
Qty: 60 | Refills: 1 | Status: ACTIVE | COMMUNITY
Start: 2024-04-23 | End: 1900-01-01

## 2024-07-01 ENCOUNTER — RX RENEWAL (OUTPATIENT)
Age: 59
End: 2024-07-01

## 2024-07-08 NOTE — PATIENT PROFILE ADULT - PATIENT'S GENDER IDENTITY
normal appearance , without tenderness upon palpation , no deformities , trachea midline , Thyroid normal size , no thyroid nodules , no masses , no JVD , thyroid nontender
Female

## 2024-08-30 ENCOUNTER — NON-APPOINTMENT (OUTPATIENT)
Age: 59
End: 2024-08-30

## 2024-09-06 ENCOUNTER — RX RENEWAL (OUTPATIENT)
Age: 59
End: 2024-09-06

## 2024-09-23 ENCOUNTER — TRANSCRIPTION ENCOUNTER (OUTPATIENT)
Age: 59
End: 2024-09-23

## 2024-09-23 ENCOUNTER — APPOINTMENT (OUTPATIENT)
Dept: BREAST CENTER | Facility: CLINIC | Age: 59
End: 2024-09-23

## 2024-09-23 DIAGNOSIS — N63.10 UNSPECIFIED LUMP IN THE RIGHT BREAST, UNSPECIFIED QUADRANT: ICD-10-CM

## 2024-09-23 PROCEDURE — 19120 REMOVAL OF BREAST LESION: CPT | Mod: 58,RT

## 2024-09-23 PROCEDURE — 99214 OFFICE O/P EST MOD 30 MIN: CPT | Mod: 25

## 2024-10-02 LAB — CORE LAB BIOPSY: NORMAL

## 2024-10-05 PROBLEM — N63.10 MASS OF RIGHT BREAST, UNSPECIFIED QUADRANT: Status: ACTIVE | Noted: 2024-10-05

## 2024-10-05 NOTE — ASSESSMENT
[FreeTextEntry1] : right breast mass excised wound care discussed f/u one week  30 minutes spent in consultation

## 2024-10-05 NOTE — PHYSICAL EXAM
[Normocephalic] : normocephalic [Sclera nonicteric] : sclera nonicteric [de-identified] : palpable mass lower, inner right breast

## 2024-10-05 NOTE — HISTORY OF PRESENT ILLNESS
[FreeTextEntry1] : the patient returns to the office for interval assessment she has a history for right breast mass  localized pain/swelling

## 2024-10-05 NOTE — PHYSICAL EXAM
[Normocephalic] : normocephalic [Sclera nonicteric] : sclera nonicteric [de-identified] : palpable mass lower, inner right breast

## 2024-11-04 ENCOUNTER — NON-APPOINTMENT (OUTPATIENT)
Age: 59
End: 2024-11-04

## 2024-11-18 ENCOUNTER — RX RENEWAL (OUTPATIENT)
Age: 59
End: 2024-11-18

## 2024-12-26 ENCOUNTER — RX CHANGE (OUTPATIENT)
Age: 59
End: 2024-12-26

## 2024-12-26 RX ORDER — BUPROPION HYDROCHLORIDE 150 MG/1
150 TABLET, FILM COATED, EXTENDED RELEASE ORAL
Qty: 90 | Refills: 1 | Status: ACTIVE | COMMUNITY
Start: 1900-01-01 | End: 1900-01-01

## 2025-01-10 ENCOUNTER — APPOINTMENT (OUTPATIENT)
Dept: ORTHOPEDIC SURGERY | Facility: CLINIC | Age: 60
End: 2025-01-10
Payer: COMMERCIAL

## 2025-01-10 DIAGNOSIS — M17.12 UNILATERAL PRIMARY OSTEOARTHRITIS, LEFT KNEE: ICD-10-CM

## 2025-01-10 DIAGNOSIS — M17.11 UNILATERAL PRIMARY OSTEOARTHRITIS, RIGHT KNEE: ICD-10-CM

## 2025-01-10 PROCEDURE — 99214 OFFICE O/P EST MOD 30 MIN: CPT

## 2025-01-10 PROCEDURE — 73560 X-RAY EXAM OF KNEE 1 OR 2: CPT | Mod: LT

## 2025-01-10 RX ORDER — HYALURONATE SODIUM 20 MG/2 ML
20 SYRINGE (ML) INTRAARTICULAR
Qty: 6 | Refills: 0 | Status: ACTIVE | OUTPATIENT
Start: 2025-01-10

## 2025-01-14 VITALS — WEIGHT: 230 LBS | BODY MASS INDEX: 36.96 KG/M2 | HEIGHT: 66 IN

## 2025-01-27 ENCOUNTER — APPOINTMENT (OUTPATIENT)
Dept: ORTHOPEDIC SURGERY | Facility: CLINIC | Age: 60
End: 2025-01-27

## 2025-01-27 PROCEDURE — 20610 DRAIN/INJ JOINT/BURSA W/O US: CPT | Mod: 50

## 2025-01-30 ENCOUNTER — RX RENEWAL (OUTPATIENT)
Age: 60
End: 2025-01-30

## 2025-02-03 ENCOUNTER — APPOINTMENT (OUTPATIENT)
Dept: ORTHOPEDIC SURGERY | Facility: CLINIC | Age: 60
End: 2025-02-03

## 2025-02-03 PROCEDURE — 20610 DRAIN/INJ JOINT/BURSA W/O US: CPT | Mod: 50

## 2025-02-05 ENCOUNTER — NON-APPOINTMENT (OUTPATIENT)
Age: 60
End: 2025-02-05

## 2025-02-10 ENCOUNTER — APPOINTMENT (OUTPATIENT)
Dept: ORTHOPEDIC SURGERY | Facility: CLINIC | Age: 60
End: 2025-02-10
Payer: COMMERCIAL

## 2025-02-10 DIAGNOSIS — M17.11 UNILATERAL PRIMARY OSTEOARTHRITIS, RIGHT KNEE: ICD-10-CM

## 2025-02-10 DIAGNOSIS — M17.12 UNILATERAL PRIMARY OSTEOARTHRITIS, LEFT KNEE: ICD-10-CM

## 2025-02-10 PROCEDURE — 20610 DRAIN/INJ JOINT/BURSA W/O US: CPT | Mod: 50

## 2025-02-12 ENCOUNTER — RX CHANGE (OUTPATIENT)
Age: 60
End: 2025-02-12

## 2025-02-20 ENCOUNTER — APPOINTMENT (OUTPATIENT)
Dept: ORTHOPEDIC SURGERY | Facility: CLINIC | Age: 60
End: 2025-02-20

## 2025-02-20 ENCOUNTER — APPOINTMENT (OUTPATIENT)
Dept: FAMILY MEDICINE | Facility: CLINIC | Age: 60
End: 2025-02-20
Payer: COMMERCIAL

## 2025-02-20 VITALS
SYSTOLIC BLOOD PRESSURE: 122 MMHG | HEIGHT: 66 IN | OXYGEN SATURATION: 96 % | DIASTOLIC BLOOD PRESSURE: 80 MMHG | WEIGHT: 210 LBS | HEART RATE: 94 BPM | BODY MASS INDEX: 33.75 KG/M2 | TEMPERATURE: 97.9 F

## 2025-02-20 DIAGNOSIS — M06.9 RHEUMATOID ARTHRITIS, UNSPECIFIED: ICD-10-CM

## 2025-02-20 DIAGNOSIS — M81.0 AGE-RELATED OSTEOPOROSIS W/OUT CURRENT PATHOLOGICAL FRACTURE: ICD-10-CM

## 2025-02-20 DIAGNOSIS — Z00.00 ENCOUNTER FOR GENERAL ADULT MEDICAL EXAMINATION W/OUT ABNORMAL FINDINGS: ICD-10-CM

## 2025-02-20 DIAGNOSIS — J44.9 CHRONIC OBSTRUCTIVE PULMONARY DISEASE, UNSPECIFIED: ICD-10-CM

## 2025-02-20 DIAGNOSIS — E55.9 VITAMIN D DEFICIENCY, UNSPECIFIED: ICD-10-CM

## 2025-02-20 DIAGNOSIS — R73.01 IMPAIRED FASTING GLUCOSE: ICD-10-CM

## 2025-02-20 DIAGNOSIS — M70.62 TROCHANTERIC BURSITIS, LEFT HIP: ICD-10-CM

## 2025-02-20 DIAGNOSIS — R73.03 PREDIABETES.: ICD-10-CM

## 2025-02-20 DIAGNOSIS — M70.61 TROCHANTERIC BURSITIS, RIGHT HIP: ICD-10-CM

## 2025-02-20 DIAGNOSIS — E78.2 MIXED HYPERLIPIDEMIA: ICD-10-CM

## 2025-02-20 DIAGNOSIS — E66.811 OBESITY, CLASS 1: ICD-10-CM

## 2025-02-20 PROCEDURE — 99213 OFFICE O/P EST LOW 20 MIN: CPT | Mod: 25

## 2025-02-20 PROCEDURE — 73522 X-RAY EXAM HIPS BI 3-4 VIEWS: CPT | Mod: RT

## 2025-02-20 PROCEDURE — 99396 PREV VISIT EST AGE 40-64: CPT

## 2025-02-20 PROCEDURE — 36415 COLL VENOUS BLD VENIPUNCTURE: CPT

## 2025-02-20 PROCEDURE — 20610 DRAIN/INJ JOINT/BURSA W/O US: CPT | Mod: LT

## 2025-02-20 RX ORDER — EVOLOCUMAB 140 MG/ML
INJECTION, SOLUTION SUBCUTANEOUS
Refills: 0 | Status: ACTIVE | COMMUNITY

## 2025-02-20 RX ORDER — FLUTICASONE FUROATE, UMECLIDINIUM BROMIDE AND VILANTEROL TRIFENATATE 200; 62.5; 25 UG/1; UG/1; UG/1
POWDER RESPIRATORY (INHALATION)
Refills: 0 | Status: ACTIVE | COMMUNITY

## 2025-02-20 RX ORDER — BUPROPION HYDROCHLORIDE 150 MG/1
150 TABLET, FILM COATED, EXTENDED RELEASE ORAL
Qty: 180 | Refills: 1 | Status: DISCONTINUED | COMMUNITY
End: 2025-02-20

## 2025-02-20 RX ORDER — PANTOPRAZOLE 40 MG/1
40 TABLET, DELAYED RELEASE ORAL
Refills: 0 | Status: ACTIVE | COMMUNITY

## 2025-02-21 ENCOUNTER — TRANSCRIPTION ENCOUNTER (OUTPATIENT)
Age: 60
End: 2025-02-21

## 2025-02-21 DIAGNOSIS — D75.839 THROMBOCYTOSIS, UNSPECIFIED: ICD-10-CM

## 2025-02-21 LAB
25(OH)D3 SERPL-MCNC: 33.5 NG/ML
ALBUMIN SERPL ELPH-MCNC: 4.4 G/DL
ALP BLD-CCNC: 91 U/L
ALT SERPL-CCNC: 10 U/L
ANION GAP SERPL CALC-SCNC: 12 MMOL/L
AST SERPL-CCNC: 14 U/L
BASOPHILS # BLD AUTO: 0.12 K/UL
BASOPHILS NFR BLD AUTO: 1.4 %
BILIRUB SERPL-MCNC: 0.2 MG/DL
BUN SERPL-MCNC: 14 MG/DL
CALCIUM SERPL-MCNC: 8.7 MG/DL
CHLORIDE SERPL-SCNC: 104 MMOL/L
CHOLEST SERPL-MCNC: 188 MG/DL
CO2 SERPL-SCNC: 21 MMOL/L
CREAT SERPL-MCNC: 0.67 MG/DL
EGFR: 101 ML/MIN/1.73M2
EOSINOPHIL # BLD AUTO: 0.39 K/UL
EOSINOPHIL NFR BLD AUTO: 4.5 %
ESTIMATED AVERAGE GLUCOSE: 111 MG/DL
GLUCOSE SERPL-MCNC: 92 MG/DL
HBA1C MFR BLD HPLC: 5.5 %
HCT VFR BLD CALC: 43.8 %
HDLC SERPL-MCNC: 63 MG/DL
HGB BLD-MCNC: 13.9 G/DL
IMM GRANULOCYTES NFR BLD AUTO: 0.3 %
LDLC SERPL CALC-MCNC: 104 MG/DL
LYMPHOCYTES # BLD AUTO: 1.25 K/UL
LYMPHOCYTES NFR BLD AUTO: 14.5 %
MAN DIFF?: NORMAL
MCHC RBC-ENTMCNC: 28.6 PG
MCHC RBC-ENTMCNC: 31.7 G/DL
MCV RBC AUTO: 90.1 FL
MONOCYTES # BLD AUTO: 0.54 K/UL
MONOCYTES NFR BLD AUTO: 6.3 %
NEUTROPHILS # BLD AUTO: 6.31 K/UL
NEUTROPHILS NFR BLD AUTO: 73 %
NONHDLC SERPL-MCNC: 125 MG/DL
PLATELET # BLD AUTO: 455 K/UL
POTASSIUM SERPL-SCNC: 5 MMOL/L
PROT SERPL-MCNC: 7.1 G/DL
RBC # BLD: 4.86 M/UL
RBC # FLD: 13.4 %
SODIUM SERPL-SCNC: 137 MMOL/L
TRIGL SERPL-MCNC: 122 MG/DL
TSH SERPL-ACNC: 1.2 UIU/ML
WBC # FLD AUTO: 8.64 K/UL

## 2025-02-24 ENCOUNTER — TRANSCRIPTION ENCOUNTER (OUTPATIENT)
Age: 60
End: 2025-02-24

## 2025-02-24 VITALS
WEIGHT: 210 LBS | SYSTOLIC BLOOD PRESSURE: 122 MMHG | HEIGHT: 66 IN | DIASTOLIC BLOOD PRESSURE: 80 MMHG | BODY MASS INDEX: 33.75 KG/M2

## 2025-02-25 ENCOUNTER — TRANSCRIPTION ENCOUNTER (OUTPATIENT)
Age: 60
End: 2025-02-25

## 2025-02-26 RX ORDER — TIRZEPATIDE 2.5 MG/.5ML
2.5 INJECTION, SOLUTION SUBCUTANEOUS
Qty: 1 | Refills: 3 | Status: ACTIVE | COMMUNITY
Start: 2025-02-20

## 2025-02-27 ENCOUNTER — APPOINTMENT (OUTPATIENT)
Dept: ORTHOPEDIC SURGERY | Facility: CLINIC | Age: 60
End: 2025-02-27

## 2025-02-27 PROCEDURE — 99213 OFFICE O/P EST LOW 20 MIN: CPT | Mod: 25

## 2025-02-27 PROCEDURE — 20610 DRAIN/INJ JOINT/BURSA W/O US: CPT | Mod: RT

## 2025-03-05 ENCOUNTER — TRANSCRIPTION ENCOUNTER (OUTPATIENT)
Age: 60
End: 2025-03-05

## 2025-03-09 ENCOUNTER — NON-APPOINTMENT (OUTPATIENT)
Age: 60
End: 2025-03-09

## 2025-05-08 ENCOUNTER — APPOINTMENT (OUTPATIENT)
Dept: DERMATOLOGY | Facility: CLINIC | Age: 60
End: 2025-05-08
Payer: COMMERCIAL

## 2025-05-08 VITALS — BODY MASS INDEX: 34.55 KG/M2 | HEIGHT: 66 IN | WEIGHT: 215 LBS

## 2025-05-08 DIAGNOSIS — L81.4 OTHER MELANIN HYPERPIGMENTATION: ICD-10-CM

## 2025-05-08 DIAGNOSIS — L91.8 OTHER HYPERTROPHIC DISORDERS OF THE SKIN: ICD-10-CM

## 2025-05-08 DIAGNOSIS — D23.9 OTHER BENIGN NEOPLASM OF SKIN, UNSPECIFIED: ICD-10-CM

## 2025-05-08 DIAGNOSIS — Z12.83 ENCOUNTER FOR SCREENING FOR MALIGNANT NEOPLASM OF SKIN: ICD-10-CM

## 2025-05-08 PROCEDURE — 99202 OFFICE O/P NEW SF 15 MIN: CPT | Mod: 25

## 2025-05-08 PROCEDURE — 17110 DESTRUCTION B9 LES UP TO 14: CPT

## 2025-05-16 NOTE — ED ADULT TRIAGE NOTE - IDEAL BODY WEIGHT(KG)
----- Message from Neelam Naik sent at 5/8/2025  9:26 AM EDT -----  Can you let her know her BNP is normal I'm happy with the result of the test.   Thanks Maggi   ----- Message -----  From: [facility] Results In  Sent: 4/29/2025  10:29 AM EDT  To: Neelam Naik MD     60

## 2025-06-12 ENCOUNTER — RX RENEWAL (OUTPATIENT)
Age: 60
End: 2025-06-12

## 2025-07-18 ENCOUNTER — RX RENEWAL (OUTPATIENT)
Age: 60
End: 2025-07-18